# Patient Record
Sex: MALE | Race: WHITE | NOT HISPANIC OR LATINO | Employment: OTHER | URBAN - METROPOLITAN AREA
[De-identification: names, ages, dates, MRNs, and addresses within clinical notes are randomized per-mention and may not be internally consistent; named-entity substitution may affect disease eponyms.]

---

## 2018-05-04 ENCOUNTER — TELEPHONE (OUTPATIENT)
Dept: PODIATRY | Facility: CLINIC | Age: 58
End: 2018-05-04

## 2018-05-04 NOTE — TELEPHONE ENCOUNTER
----- Message from Maria Eugenia Mahan sent at 5/3/2018  2:40 PM CDT -----  Contact: [phone]  Patient requesting a refill on antiinflammatory.    Patient will be using [facility] in [facility]#[phone].    Please call patient at [phone].     Thanks!

## 2018-05-05 RX ORDER — DICLOFENAC SODIUM 75 MG/1
75 TABLET, DELAYED RELEASE ORAL 2 TIMES DAILY
Qty: 60 TABLET | Refills: 2 | Status: SHIPPED | OUTPATIENT
Start: 2018-05-05 | End: 2018-09-14 | Stop reason: SDUPTHER

## 2018-05-15 ENCOUNTER — OFFICE VISIT (OUTPATIENT)
Dept: PODIATRY | Facility: CLINIC | Age: 58
End: 2018-05-15
Payer: COMMERCIAL

## 2018-05-15 ENCOUNTER — HOSPITAL ENCOUNTER (OUTPATIENT)
Dept: RADIOLOGY | Facility: HOSPITAL | Age: 58
Discharge: HOME | End: 2018-05-15
Attending: PODIATRIST
Payer: COMMERCIAL

## 2018-05-15 VITALS
HEART RATE: 67 BPM | BODY MASS INDEX: 27.35 KG/M2 | WEIGHT: 220 LBS | HEIGHT: 75 IN | TEMPERATURE: 98 F | DIASTOLIC BLOOD PRESSURE: 84 MMHG | SYSTOLIC BLOOD PRESSURE: 152 MMHG

## 2018-05-15 DIAGNOSIS — M84.375A STRESS FRACTURE OF LEFT FOOT, INITIAL ENCOUNTER: Primary | ICD-10-CM

## 2018-05-15 DIAGNOSIS — L03.116 CELLULITIS OF LEFT FOOT: ICD-10-CM

## 2018-05-15 DIAGNOSIS — M84.375A STRESS FRACTURE OF LEFT FOOT, INITIAL ENCOUNTER: ICD-10-CM

## 2018-05-15 PROCEDURE — 73630 X-RAY EXAM OF FOOT: CPT | Mod: TC,FY,LT

## 2018-05-15 PROCEDURE — 87186 SC STD MICRODIL/AGAR DIL: CPT

## 2018-05-15 PROCEDURE — 99999 PR PBB SHADOW E&M-EST. PATIENT-LVL III: CPT | Mod: 25,PBBFAC,, | Performed by: PODIATRIST

## 2018-05-15 PROCEDURE — 3008F BODY MASS INDEX DOCD: CPT | Mod: CPTII,S$GLB,, | Performed by: PODIATRIST

## 2018-05-15 PROCEDURE — 87070 CULTURE OTHR SPECIMN AEROBIC: CPT

## 2018-05-15 PROCEDURE — 99213 OFFICE O/P EST LOW 20 MIN: CPT | Mod: S$GLB,,, | Performed by: PODIATRIST

## 2018-05-15 PROCEDURE — 73630 X-RAY EXAM OF FOOT: CPT | Mod: 26,LT,, | Performed by: RADIOLOGY

## 2018-05-15 PROCEDURE — 87077 CULTURE AEROBIC IDENTIFY: CPT

## 2018-05-15 RX ORDER — HYDROCODONE BITARTRATE AND ACETAMINOPHEN 5; 325 MG/1; MG/1
TABLET ORAL
COMMUNITY
End: 2018-10-11 | Stop reason: SDUPTHER

## 2018-05-15 RX ORDER — SULFAMETHOXAZOLE AND TRIMETHOPRIM 400; 80 MG/1; MG/1
TABLET ORAL
COMMUNITY
End: 2018-05-15 | Stop reason: SDUPTHER

## 2018-05-15 RX ORDER — DICLOFENAC SODIUM 75 MG/1
TABLET, DELAYED RELEASE ORAL
COMMUNITY
End: 2018-05-15

## 2018-05-15 RX ORDER — LISINOPRIL 10 MG/1
TABLET ORAL
COMMUNITY
End: 2018-08-21

## 2018-05-15 RX ORDER — SULFAMETHOXAZOLE AND TRIMETHOPRIM 400; 80 MG/1; MG/1
2 TABLET ORAL 2 TIMES DAILY
Qty: 56 TABLET | Refills: 0 | Status: SHIPPED | OUTPATIENT
Start: 2018-05-15 | End: 2018-05-29

## 2018-05-15 RX ORDER — GABAPENTIN 300 MG/1
CAPSULE ORAL
COMMUNITY
End: 2018-11-20 | Stop reason: SDUPTHER

## 2018-05-15 RX ORDER — OXYCODONE AND ACETAMINOPHEN 5; 325 MG/1; MG/1
TABLET ORAL
COMMUNITY
End: 2018-05-15

## 2018-05-15 RX ORDER — PROMETHAZINE HYDROCHLORIDE 12.5 MG/1
TABLET ORAL
COMMUNITY
End: 2018-07-23 | Stop reason: SDUPTHER

## 2018-05-16 ENCOUNTER — TELEPHONE (OUTPATIENT)
Dept: PODIATRY | Facility: CLINIC | Age: 58
End: 2018-05-16

## 2018-05-16 NOTE — TELEPHONE ENCOUNTER
----- Message from Gentry Flores DPM sent at 5/16/2018  7:34 AM CDT -----  Please call the patient regarding his abnormal result. There are signs of a stress fx he needs to be in the boot and will need a F/U visit in the [facility] in 2 weeks to see how he is doing but the boot needs to be on alt all times.

## 2018-05-16 NOTE — TELEPHONE ENCOUNTER
Advised pt of xray results and what he needed to do pt verbalized understanding appt scheduled for 2wk f/u

## 2018-05-16 NOTE — TELEPHONE ENCOUNTER
----- Message from Candice Reed sent at 5/16/2018 11:04 AM CDT -----  Type:  Patient Returning Call    Who Left Message for Patient:  Suad  Does the patient know what this is regarding?:  Xray results  Best Call Back Number: [phone] (home)

## 2018-05-16 NOTE — TELEPHONE ENCOUNTER
----- Message from Gentry Flores DPM sent at 5/16/2018  7:34 AM CDT -----  Please call the patient regarding his abnormal result. There are signs of a stress fx he needs to be in the boot and will need a F/U visit in the [facility] in 2 weeks to see how he is doing but the boot needs to be on alt all times.

## 2018-05-18 ENCOUNTER — TELEPHONE (OUTPATIENT)
Dept: PODIATRY | Facility: CLINIC | Age: 58
End: 2018-05-18

## 2018-05-18 LAB — BACTERIA SPEC AEROBE CULT: NORMAL

## 2018-05-18 NOTE — TELEPHONE ENCOUNTER
----- Message from Gentry Flores DPM sent at 5/18/2018  4:09 PM CDT -----  Please call the patient regarding his abnormal result.Call and advise C&S + staph he is to continue taking the Bactrim as rx.

## 2018-05-20 NOTE — PROGRESS NOTES
Subjective:       Patient ID: Jerry Islas is a 58 y.o. male.    Chief Complaint: Follow-up and Foot Problem   patient presents today he is complaining of pain in the left foot he's also been experiencing swelling in this area he states the pain is more in the middle of his foot not where he had surgery he has had some drainage over the distal part of the incision.  HPI  Review of Systems   Musculoskeletal: Positive for arthralgias, gait problem and joint swelling.   All other systems reviewed and are negative.      Objective:      Physical Exam   Constitutional: He appears well-developed and well-nourished.   Cardiovascular:   Pulses:       Dorsalis pedis pulses are 2+ on the right side, and 2+ on the left side.        Posterior tibial pulses are 1+ on the right side, and 1+ on the left side.   Musculoskeletal: He exhibits tenderness and deformity.        Left foot: There is decreased range of motion and deformity.        Feet:    Feet:   Right Foot:   Protective Sensation: 4 sites tested. 4 sites sensed.   Left Foot:   Protective Sensation: 4 sites tested. 4 sites sensed.   Skin Integrity: Positive for ulcer, skin breakdown, warmth and dry skin.   Neurological: He is alert.   Skin: Capillary refill takes 2 to 3 seconds.   Psychiatric: He has a normal mood and affect. His behavior is normal. Judgment and thought content normal.       Assessment:       1. Stress fracture of left foot, initial encounter    2. Cellulitis of left foot        Plan:       Following evaluation patient has a small open area displays positive drainage a culture and sensitivity was performed of this area patient will be placed on appropriate antibiotics pending culture and sensitivity.  Patient will initially be placed on Bactrim.  Patient has a lot of swelling in the left midfoot area this is not related to surgery patient has completely healed and had been discharged since his surgery for fusion first MPJ this is unrelated evaluation  and management patient is beyond his global period.  Patient was advised I am concerned about the possibility of stress fracture in the left foot because of the amount of swelling and tenderness especially overlying the second and third metatarsal area I have ordered x-rays of the area in the meantime I want the patient to get into his fracture boot until I see him for follow-up subsequent evaluation did reveal reveal a stress fracture second metatarsal left patient was contacted advised as to the situation and is to wear the fracture boot at all times I have recommended ice and elevation.

## 2018-05-30 ENCOUNTER — OFFICE VISIT (OUTPATIENT)
Dept: PODIATRY | Facility: CLINIC | Age: 58
End: 2018-05-30
Payer: COMMERCIAL

## 2018-05-30 ENCOUNTER — HOSPITAL ENCOUNTER (OUTPATIENT)
Dept: RADIOLOGY | Facility: HOSPITAL | Age: 58
Discharge: HOME | End: 2018-05-30
Attending: PODIATRIST
Payer: COMMERCIAL

## 2018-05-30 VITALS
HEIGHT: 75 IN | SYSTOLIC BLOOD PRESSURE: 117 MMHG | BODY MASS INDEX: 24.87 KG/M2 | WEIGHT: 200 LBS | DIASTOLIC BLOOD PRESSURE: 71 MMHG | TEMPERATURE: 96 F | HEART RATE: 75 BPM

## 2018-05-30 DIAGNOSIS — M84.375G STRESS FRACTURE OF LEFT FOOT WITH DELAYED HEALING, SUBSEQUENT ENCOUNTER: Primary | ICD-10-CM

## 2018-05-30 DIAGNOSIS — M84.375G STRESS FRACTURE OF LEFT FOOT WITH DELAYED HEALING, SUBSEQUENT ENCOUNTER: ICD-10-CM

## 2018-05-30 PROCEDURE — 73630 X-RAY EXAM OF FOOT: CPT | Mod: 26,LT,, | Performed by: RADIOLOGY

## 2018-05-30 PROCEDURE — 99213 OFFICE O/P EST LOW 20 MIN: CPT | Mod: S$GLB,,, | Performed by: PODIATRIST

## 2018-05-30 PROCEDURE — 99999 PR PBB SHADOW E&M-EST. PATIENT-LVL III: CPT | Mod: 25,PBBFAC,, | Performed by: PODIATRIST

## 2018-05-30 PROCEDURE — 73630 X-RAY EXAM OF FOOT: CPT | Mod: TC,FY,LT

## 2018-05-30 PROCEDURE — 3008F BODY MASS INDEX DOCD: CPT | Mod: CPTII,S$GLB,, | Performed by: PODIATRIST

## 2018-06-02 NOTE — PROGRESS NOTES
Subjective:       Patient ID: Jerry Islas is a 58 y.o. male.    Chief Complaint: Follow-up   patient presents today he is complaining of pain in the left foot he's also been experiencing swelling in this area he states the pain is more in the middle of his foot not where he had surgery he has had some drainage over the distal part of the incision.  HPI  Review of Systems   Musculoskeletal: Positive for arthralgias, gait problem and joint swelling.   All other systems reviewed and are negative.      Objective:      Physical Exam   Constitutional: He appears well-developed and well-nourished.   Cardiovascular:   Pulses:       Dorsalis pedis pulses are 2+ on the right side, and 2+ on the left side.        Posterior tibial pulses are 1+ on the right side, and 1+ on the left side.   Musculoskeletal: He exhibits tenderness and deformity.        Left foot: There is decreased range of motion and deformity.        Feet:    Feet:   Right Foot:   Protective Sensation: 4 sites tested. 4 sites sensed.   Left Foot:   Protective Sensation: 4 sites tested. 4 sites sensed.   Skin Integrity: Positive for ulcer, skin breakdown, warmth and dry skin.   Neurological: He is alert.   Skin: Capillary refill takes 2 to 3 seconds.   Psychiatric: He has a normal mood and affect. His behavior is normal. Judgment and thought content normal.       Assessment:       1. Stress fracture of left foot with delayed healing, subsequent encounter        Plan:       Following evaluation patient has less inflammation the previously noted he is also having less pain he states the gabapentin is helping some I have reviewed the patient's previous and today's x-rays with him showing the patient a stress fracture of the 2nd metatarsal I have advised him it is stable with a well-formed bone callus around the area patient was dispensed full length arch supports he was advised to wear these in the fracture boot that should help to off load pressure on the  forefoot giving him relief I want see how the patient is doing at his next follow-up in several weeks in the meantime the patient is to continue diclofenac gabapentin he does have a very small opening over the big toe this appears completely resolved at this time displays no drainage no active signs of infection.  Patient advised that may need to add some additional support to the power step depending upon how he tolerates them.

## 2018-06-13 ENCOUNTER — HOSPITAL ENCOUNTER (OUTPATIENT)
Dept: RADIOLOGY | Facility: HOSPITAL | Age: 58
Discharge: HOME | End: 2018-06-13
Attending: PODIATRIST
Payer: COMMERCIAL

## 2018-06-13 ENCOUNTER — OFFICE VISIT (OUTPATIENT)
Dept: PODIATRY | Facility: CLINIC | Age: 58
End: 2018-06-13
Payer: COMMERCIAL

## 2018-06-13 VITALS
DIASTOLIC BLOOD PRESSURE: 76 MMHG | RESPIRATION RATE: 18 BRPM | SYSTOLIC BLOOD PRESSURE: 131 MMHG | HEART RATE: 72 BPM | TEMPERATURE: 96 F

## 2018-06-13 DIAGNOSIS — M84.375G STRESS FRACTURE OF LEFT FOOT WITH DELAYED HEALING, SUBSEQUENT ENCOUNTER: Primary | ICD-10-CM

## 2018-06-13 DIAGNOSIS — M84.375G STRESS FRACTURE OF LEFT FOOT WITH DELAYED HEALING, SUBSEQUENT ENCOUNTER: ICD-10-CM

## 2018-06-13 PROCEDURE — 73630 X-RAY EXAM OF FOOT: CPT | Mod: 26,LT,, | Performed by: RADIOLOGY

## 2018-06-13 PROCEDURE — 73630 X-RAY EXAM OF FOOT: CPT | Mod: TC,FY,LT

## 2018-06-13 PROCEDURE — 99213 OFFICE O/P EST LOW 20 MIN: CPT | Mod: S$GLB,,, | Performed by: PODIATRIST

## 2018-06-13 PROCEDURE — 99999 PR PBB SHADOW E&M-EST. PATIENT-LVL III: CPT | Mod: 25,PBBFAC,, | Performed by: PODIATRIST

## 2018-06-16 NOTE — PROGRESS NOTES
Subjective:       Patient ID: Jerry Islas is a 58 y.o. male.    Chief Complaint: Follow-up   patient presents today he is complaining of pain in the left foot he's also been experiencing swelling in this area he states the pain is more in the middle of his foot not where he had surgery he has had some drainage over the distal part of the incision.  HPI  Review of Systems   Musculoskeletal: Positive for arthralgias, gait problem and joint swelling.   All other systems reviewed and are negative.      Objective:      Physical Exam   Constitutional: He appears well-developed and well-nourished.   Cardiovascular:   Pulses:       Dorsalis pedis pulses are 2+ on the right side, and 2+ on the left side.        Posterior tibial pulses are 1+ on the right side, and 1+ on the left side.   Musculoskeletal: He exhibits tenderness and deformity.        Left foot: There is decreased range of motion and deformity.        Feet:    Feet:   Right Foot:   Protective Sensation: 4 sites tested. 4 sites sensed.   Left Foot:   Protective Sensation: 4 sites tested. 4 sites sensed.   Skin Integrity: Positive for ulcer, skin breakdown, warmth and dry skin.   Neurological: He is alert.   Skin: Capillary refill takes 2 to 3 seconds.   Psychiatric: He has a normal mood and affect. His behavior is normal. Judgment and thought content normal.       Assessment:       1. Stress fracture of left foot with delayed healing, subsequent encounter        Plan:         Following evaluation x-rays reviewed patient advised the fracture of the 2nd metatarsal is stable per the x-rays patient is still having some swelling he states that he is able to wear a regular shoe for about 3 hr until he has a lot of swelling that he has to go back into the boot I have advised the patient this is going to be on and off for a period of time until he gets over the inflammation patient does have some deep peroneal numbness in the 1st webspace likely related to the  swelling.  I have added additional arch support to the patient's left insole to give him better support offload pressure from the forefoot area patient was dispensed a compressive stocking to be worn at all times during the day to help control inflammation plan follow-up is 3 weeks patient is advised to contact us with any problems questions or concerns prior to that time.

## 2018-07-23 RX ORDER — PROMETHAZINE HYDROCHLORIDE 12.5 MG/1
TABLET ORAL
Qty: 30 TABLET | Refills: 0 | Status: SHIPPED | OUTPATIENT
Start: 2018-07-23 | End: 2018-08-21

## 2018-08-16 ENCOUNTER — TELEPHONE (OUTPATIENT)
Dept: PODIATRY | Facility: CLINIC | Age: 58
End: 2018-08-16

## 2018-08-16 NOTE — TELEPHONE ENCOUNTER
----- Message from Anson Mcelroy sent at 8/16/2018  2:44 PM CDT -----  Contact: Wife Maite   Type:  Sooner Apoointment Request    Caller is requesting a sooner appointment.  Caller declined first available appointment listed below.  Caller will not accept being placed on the waitlist and is requesting a message be sent to doctor.    Name of Caller: Maurilio Arora   When is the first available appointment? 8/27  Symptoms: left 2nd toe fungus, red swollen   Best Call Back Number: [phone]  Additional Info: Wife is requesting tomorrow or sometime next week

## 2018-08-21 ENCOUNTER — TELEPHONE (OUTPATIENT)
Dept: PODIATRY | Facility: CLINIC | Age: 58
End: 2018-08-21

## 2018-08-21 ENCOUNTER — OFFICE VISIT (OUTPATIENT)
Dept: PODIATRY | Facility: CLINIC | Age: 58
End: 2018-08-21
Payer: COMMERCIAL

## 2018-08-21 VITALS
HEIGHT: 75 IN | BODY MASS INDEX: 27.1 KG/M2 | DIASTOLIC BLOOD PRESSURE: 83 MMHG | WEIGHT: 218 LBS | HEART RATE: 64 BPM | TEMPERATURE: 96 F | SYSTOLIC BLOOD PRESSURE: 131 MMHG

## 2018-08-21 DIAGNOSIS — L03.116 CELLULITIS OF LEFT FOOT: Primary | ICD-10-CM

## 2018-08-21 DIAGNOSIS — M20.42 HAMMER TOE OF LEFT FOOT: ICD-10-CM

## 2018-08-21 DIAGNOSIS — L97.522 SKIN ULCER OF LEFT FOOT WITH FAT LAYER EXPOSED: ICD-10-CM

## 2018-08-21 PROCEDURE — 99213 OFFICE O/P EST LOW 20 MIN: CPT | Mod: 25,S$GLB,, | Performed by: PODIATRIST

## 2018-08-21 PROCEDURE — 99999 PR PBB SHADOW E&M-EST. PATIENT-LVL III: CPT | Mod: PBBFAC,,, | Performed by: PODIATRIST

## 2018-08-21 PROCEDURE — 87186 SC STD MICRODIL/AGAR DIL: CPT | Mod: 59

## 2018-08-21 PROCEDURE — 3008F BODY MASS INDEX DOCD: CPT | Mod: CPTII,S$GLB,, | Performed by: PODIATRIST

## 2018-08-21 PROCEDURE — 87070 CULTURE OTHR SPECIMN AEROBIC: CPT

## 2018-08-21 PROCEDURE — 11042 DBRDMT SUBQ TIS 1ST 20SQCM/<: CPT | Mod: S$GLB,,, | Performed by: PODIATRIST

## 2018-08-21 PROCEDURE — 87077 CULTURE AEROBIC IDENTIFY: CPT

## 2018-08-21 RX ORDER — CIPROFLOXACIN 500 MG/1
500 TABLET ORAL 2 TIMES DAILY
Qty: 28 TABLET | Refills: 0 | Status: SHIPPED | OUTPATIENT
Start: 2018-08-21 | End: 2018-09-04

## 2018-08-21 NOTE — TELEPHONE ENCOUNTER
----- Message from William De La Cruz sent at 8/21/2018 11:49 AM CDT -----  Contact: Patient  Jerry, [phone]. Calling to inform staff that he will not be able to make it back to the office today to  paperwork. Would like to have it held until his appointment on 8/28

## 2018-08-26 LAB
BACTERIA SPEC AEROBE CULT: NORMAL

## 2018-08-26 RX ORDER — SULFAMETHOXAZOLE AND TRIMETHOPRIM 400; 80 MG/1; MG/1
2 TABLET ORAL 2 TIMES DAILY
Qty: 56 TABLET | Refills: 0 | Status: SHIPPED | OUTPATIENT
Start: 2018-08-26 | End: 2018-09-09

## 2018-08-26 NOTE — PROCEDURES
"Wound Debridement  Date/Time: 8/21/2018 8:37 PM  Performed by: Gentry Flores DPM  Authorized by: Gentry Flores DPM     Time out: Immediately prior to procedure a "time out" was called to verify the correct patient, procedure, equipment, support staff and site/side marked as required.    Consent Done?:  Yes (Verbal)    Preparation: Patient was prepped and draped in usual sterile fashion    Local anesthesia used?: No      Wound Details:    Location:  Left foot    Location:  Left 2nd Toe    Type of Debridement:  Excisional       Length (cm):  2       Area (sq cm):  4       Width (cm):  2       Percent Debrided (%):  100       Depth (cm):  0.3       Total Area Debrided (sq cm):  4    Depth of debridement:  Subcutaneous tissue    Devitalized tissue debrided:  Callus and Necrotic/Eschar    Instruments:  Blade    Bleeding:  None  Patient tolerance:  Patient tolerated the procedure well with no immediate complications      "

## 2018-08-26 NOTE — PROGRESS NOTES
Subjective:       Patient ID: Jerry Islas is a 58 y.o. male.    Chief Complaint: Foot Problem (2snd digit left foot ); Nail Problem; and Nail Care    Patient presents today with a complaint of a swollen red painful 2nd digit left foot patient states that this got bad about a week ago he states it broke open drained and is actually started to feel a little bit better but has remained very red.  Patient relates he is very pleased with the fusion of the big toe joint on the left foot this has remained completely pain-free.  HPI  Review of Systems   Musculoskeletal: Positive for arthralgias, gait problem and joint swelling.   All other systems reviewed and are negative.      Objective:      Physical Exam   Constitutional: He appears well-developed and well-nourished.   Cardiovascular:   Pulses:       Dorsalis pedis pulses are 2+ on the right side, and 2+ on the left side.        Posterior tibial pulses are 1+ on the right side, and 1+ on the left side.   Musculoskeletal: He exhibits edema, tenderness and deformity.        Left foot: There is decreased range of motion and deformity.   Feet:   Right Foot:   Protective Sensation: 4 sites tested. 4 sites sensed.   Left Foot:   Protective Sensation: 4 sites tested. 4 sites sensed.   Skin Integrity: Positive for ulcer, skin breakdown, erythema, warmth and callus.   Neurological: He is alert.   Skin: Skin is warm. Capillary refill takes 2 to 3 seconds. There is erythema.   Psychiatric: He has a normal mood and affect. His behavior is normal. Judgment and thought content normal.   Nursing note and vitals reviewed.      Assessment:       1. Cellulitis of left foot    2. Hammer toe of left foot    3. Skin ulcer of left foot with fat layer exposed        Plan:         Following extensive evaluation patient has a severe infection of the distal aspect 2nd digit left there is a large ulceration heavy drainage cellulitis encompassing the distal half of the 2nd digit left with  obvious signs of significant infection present pain is noted upon palpation and examination of the area extensive debridement was performed removing nonviable skin and tissue culture and sensitivity performed.  Patient has been started on Cipro pending culture and sensitivity once finalized the patient's antibiotics will be adjusted accordingly in the meantime patient is to keep the area dry clean he is going to clean the area with Dakin solution and dress it on a daily basis he is not to get this area wet.  Plan follow-up will be 1 week patient needs x-rays of the area to evaluate the area for osteomyelitis.  Total face-to-face time including discussion evaluation and treatment equaled 20 min.

## 2018-08-27 ENCOUNTER — TELEPHONE (OUTPATIENT)
Dept: PODIATRY | Facility: CLINIC | Age: 58
End: 2018-08-27

## 2018-08-27 NOTE — TELEPHONE ENCOUNTER
----- Message from Gentry Flores DPM sent at 8/26/2018 11:31 AM CDT -----  Please call the patient regarding his abnormal result.Call and advise there are 4 different bacteria on C&S I need him to start Bactrim in addition to cipro. Rx send.

## 2018-08-28 ENCOUNTER — OFFICE VISIT (OUTPATIENT)
Dept: PODIATRY | Facility: CLINIC | Age: 58
End: 2018-08-28
Payer: COMMERCIAL

## 2018-08-28 VITALS
DIASTOLIC BLOOD PRESSURE: 79 MMHG | SYSTOLIC BLOOD PRESSURE: 139 MMHG | BODY MASS INDEX: 27.35 KG/M2 | TEMPERATURE: 97 F | HEART RATE: 67 BPM | HEIGHT: 75 IN | WEIGHT: 220 LBS

## 2018-08-28 DIAGNOSIS — L97.522 SKIN ULCER OF LEFT FOOT WITH FAT LAYER EXPOSED: ICD-10-CM

## 2018-08-28 DIAGNOSIS — L03.116 CELLULITIS OF LEFT FOOT: Primary | ICD-10-CM

## 2018-08-28 PROCEDURE — 3008F BODY MASS INDEX DOCD: CPT | Mod: CPTII,S$GLB,, | Performed by: PODIATRIST

## 2018-08-28 PROCEDURE — 99213 OFFICE O/P EST LOW 20 MIN: CPT | Mod: S$GLB,,, | Performed by: PODIATRIST

## 2018-08-28 PROCEDURE — 99999 PR PBB SHADOW E&M-EST. PATIENT-LVL III: CPT | Mod: PBBFAC,,, | Performed by: PODIATRIST

## 2018-08-28 NOTE — TELEPHONE ENCOUNTER
Left message for pt of lab results and another ABT to be added along with cipro. Pt has appt today will make sure message was received

## 2018-09-03 NOTE — PROGRESS NOTES
Subjective:       Patient ID: Jerry Islas is a 58 y.o. male.    Chief Complaint: Nail Problem and Foot Problem    Patient presents today with a complaint of a swollen red painful 2nd digit left foot patient states that this got bad about a week ago he states it broke open drained and is actually started to feel a little bit better but has remained very red.  Patient relates he is very pleased with the fusion of the big toe joint on the left foot this has remained completely pain-free.  Nail Problem   Associated symptoms include arthralgias and joint swelling.     Review of Systems   Musculoskeletal: Positive for arthralgias, gait problem and joint swelling.   All other systems reviewed and are negative.      Objective:      Physical Exam   Constitutional: He appears well-developed and well-nourished.   Cardiovascular:   Pulses:       Dorsalis pedis pulses are 2+ on the right side, and 2+ on the left side.        Posterior tibial pulses are 1+ on the right side, and 1+ on the left side.   Musculoskeletal: He exhibits edema, tenderness and deformity.        Left foot: There is decreased range of motion and deformity.   Feet:   Right Foot:   Protective Sensation: 4 sites tested. 4 sites sensed.   Left Foot:   Protective Sensation: 4 sites tested. 4 sites sensed.   Skin Integrity: Positive for ulcer, skin breakdown, erythema, warmth and callus.   Neurological: He is alert.   Skin: Skin is warm. Capillary refill takes 2 to 3 seconds. There is erythema.   Psychiatric: He has a normal mood and affect. His behavior is normal. Judgment and thought content normal.   Nursing note and vitals reviewed.      Assessment:       1. Cellulitis of left foot    2. Skin ulcer of left foot with fat layer exposed        Plan:         Following evaluation patient still has severe pain in the 2nd digit left foot this was painful even to light touch I have advised the patient I want to see him in my other office for his next visit so I  can take an x-ray of the area the 2nd digit left is severely contracted he now has an ulceration on the distal aspect of the 2nd digit while the area looks better some of the redness and cellulitis as well as edema has decreased patient still has severe pain in this area he will continue to take his Cipro and Bactrim as prescribed he states he was not able to  the Bactrim just yet because we recently added this per his culture and sensitivity he will clean the area with Dakin solution apply a well-padded dry dressing.  Follow-up will be 1 week further evaluation treatment an x-ray.  Patient was made aware I am concerned about the possibility of osteomyelitis in the distal 2nd digit left.

## 2018-09-05 ENCOUNTER — OFFICE VISIT (OUTPATIENT)
Dept: PODIATRY | Facility: CLINIC | Age: 58
End: 2018-09-05
Payer: COMMERCIAL

## 2018-09-05 ENCOUNTER — HOSPITAL ENCOUNTER (OUTPATIENT)
Dept: RADIOLOGY | Facility: HOSPITAL | Age: 58
Discharge: HOME | End: 2018-09-05
Attending: PODIATRIST
Payer: COMMERCIAL

## 2018-09-05 VITALS
BODY MASS INDEX: 27.35 KG/M2 | TEMPERATURE: 97 F | DIASTOLIC BLOOD PRESSURE: 79 MMHG | HEART RATE: 67 BPM | SYSTOLIC BLOOD PRESSURE: 155 MMHG | WEIGHT: 220 LBS | HEIGHT: 75 IN

## 2018-09-05 DIAGNOSIS — L03.116 CELLULITIS OF LEFT FOOT: Primary | ICD-10-CM

## 2018-09-05 DIAGNOSIS — L03.116 CELLULITIS OF LEFT FOOT: ICD-10-CM

## 2018-09-05 DIAGNOSIS — L97.521 SKIN ULCER OF LEFT FOOT, LIMITED TO BREAKDOWN OF SKIN: ICD-10-CM

## 2018-09-05 PROCEDURE — 99999 PR PBB SHADOW E&M-EST. PATIENT-LVL III: CPT | Mod: PBBFAC,,, | Performed by: PODIATRIST

## 2018-09-05 PROCEDURE — 99213 OFFICE O/P EST LOW 20 MIN: CPT | Mod: S$GLB,,, | Performed by: PODIATRIST

## 2018-09-05 PROCEDURE — 3008F BODY MASS INDEX DOCD: CPT | Mod: CPTII,S$GLB,, | Performed by: PODIATRIST

## 2018-09-05 PROCEDURE — 73630 X-RAY EXAM OF FOOT: CPT | Mod: TC,FY,LT

## 2018-09-05 PROCEDURE — 73630 X-RAY EXAM OF FOOT: CPT | Mod: 26,LT,, | Performed by: RADIOLOGY

## 2018-09-08 PROBLEM — L97.521 SKIN ULCER OF LEFT FOOT, LIMITED TO BREAKDOWN OF SKIN: Status: ACTIVE | Noted: 2018-09-08

## 2018-09-09 NOTE — PROGRESS NOTES
Subjective:       Patient ID: Jerry Islas is a 58 y.o. male.    Chief Complaint: Follow-up and Foot Problem      Patient presents for follow-up cellulitis abscess 2nd digit left.  Patient states he is having significant reduction in previously noted pain and discomfort left.  Nail Problem   Associated symptoms include arthralgias and joint swelling.     Review of Systems   Musculoskeletal: Positive for arthralgias, gait problem and joint swelling.   All other systems reviewed and are negative.      Objective:      Physical Exam   Constitutional: He appears well-developed and well-nourished.   Cardiovascular:   Pulses:       Dorsalis pedis pulses are 2+ on the right side, and 2+ on the left side.        Posterior tibial pulses are 1+ on the right side, and 1+ on the left side.   Musculoskeletal: He exhibits edema, tenderness and deformity.        Left foot: There is decreased range of motion and deformity.   Feet:   Right Foot:   Protective Sensation: 4 sites tested. 4 sites sensed.   Left Foot:   Protective Sensation: 4 sites tested. 4 sites sensed.   Skin Integrity: Positive for ulcer, skin breakdown, erythema, warmth and callus.   Neurological: He is alert.   Skin: Skin is warm. Capillary refill takes 2 to 3 seconds. There is erythema.   Psychiatric: He has a normal mood and affect. His behavior is normal. Judgment and thought content normal.   Nursing note and vitals reviewed.      Assessment:       1. Cellulitis of left foot        Plan:           On evaluation patient has significant reduction in previously noted pain cellulitis and edema of the 2nd digit left foot the ulceration at the distal aspect of the 2nd digit is well resolving at this time patient still has severe contracture 2nd digit left however overall improvement is noted significant reduction in previous tenderness is noted.  Ulceration distal 2nd digit left has well-formed granular tissue no active signs of infection noted.  Following  evaluation I did non excisionally debride the remaining nonviable tissue and nail at the distal aspect of the 2nd digit left advising the patient the infection appears well resolving at this time patient did have a hyperkeratotic lesion sub 3rd metatarsal left this is related to digital contracture also this was painful upon palpation debridement of the area non excisional displayed no signs of infection.  I did discuss an arthrodesis of the 2nd and 3rd digits left for surgical correction and reduction of these digits because of the severe deformities and contractures patient advised this likely can occur again with the 2nd digit ulceration and infection because of the contracture and weight on the distal aspect of the digit.  Patient is going to think about the surgical option as discussed today and will consider this I plan to see the patient as needed for followup he will monitor this area in the 2nd digit any increased redness swelling or pain the patient is to contact me immediately.  X-rays evaluated and reviewed to the patient no signs of osteomyelitis noted distal aspect 2nd digit left.

## 2018-09-14 RX ORDER — DICLOFENAC SODIUM 75 MG/1
TABLET, DELAYED RELEASE ORAL
Qty: 60 TABLET | Refills: 2 | Status: SHIPPED | OUTPATIENT
Start: 2018-09-14 | End: 2019-01-07 | Stop reason: SDUPTHER

## 2018-10-10 ENCOUNTER — TELEPHONE (OUTPATIENT)
Dept: PODIATRY | Facility: CLINIC | Age: 58
End: 2018-10-10

## 2018-10-10 NOTE — TELEPHONE ENCOUNTER
----- Message from Lizzy Truong sent at 10/10/2018 10:17 AM CDT -----  Contact: Patient  Type:  Sooner Apoointment Request    Caller is requesting a sooner appointment.  Caller declined first available appointment listed below.  Caller will not accept being placed on the waitlist and is requesting a message be sent to doctor.    Name of Caller:  Patient   When is the first available appointment?  10/17  Symptoms:  Follow-up on left foot, patient can barely walk on it  Best Call Back Number:    Additional Information:

## 2018-10-11 ENCOUNTER — OFFICE VISIT (OUTPATIENT)
Dept: PODIATRY | Facility: CLINIC | Age: 58
End: 2018-10-11
Payer: COMMERCIAL

## 2018-10-11 VITALS
DIASTOLIC BLOOD PRESSURE: 74 MMHG | BODY MASS INDEX: 27.35 KG/M2 | HEIGHT: 75 IN | HEART RATE: 71 BPM | WEIGHT: 220 LBS | RESPIRATION RATE: 18 BRPM | SYSTOLIC BLOOD PRESSURE: 131 MMHG

## 2018-10-11 DIAGNOSIS — L02.612 CELLULITIS AND ABSCESS OF TOE OF LEFT FOOT: ICD-10-CM

## 2018-10-11 DIAGNOSIS — G62.9 NEUROPATHY: ICD-10-CM

## 2018-10-11 DIAGNOSIS — L03.032 CELLULITIS AND ABSCESS OF TOE OF LEFT FOOT: ICD-10-CM

## 2018-10-11 DIAGNOSIS — M21.962 ACQUIRED DEFORMITY OF JOINT OF LEFT FOOT: ICD-10-CM

## 2018-10-11 DIAGNOSIS — L97.522 NON-PRESSURE CHRONIC ULCER OF OTHER PART OF LEFT FOOT WITH FAT LAYER EXPOSED: ICD-10-CM

## 2018-10-11 DIAGNOSIS — L97.522 SKIN ULCER OF LEFT FOOT WITH FAT LAYER EXPOSED: Primary | ICD-10-CM

## 2018-10-11 PROCEDURE — 87070 CULTURE OTHR SPECIMN AEROBIC: CPT

## 2018-10-11 PROCEDURE — 99999 PR PBB SHADOW E&M-EST. PATIENT-LVL III: CPT | Mod: PBBFAC,,, | Performed by: PODIATRIST

## 2018-10-11 PROCEDURE — 3008F BODY MASS INDEX DOCD: CPT | Mod: S$GLB,,, | Performed by: PODIATRIST

## 2018-10-11 PROCEDURE — 99214 OFFICE O/P EST MOD 30 MIN: CPT | Mod: 25,S$GLB,, | Performed by: PODIATRIST

## 2018-10-11 PROCEDURE — 97597 DBRDMT OPN WND 1ST 20 CM/<: CPT | Mod: S$GLB,,, | Performed by: PODIATRIST

## 2018-10-11 PROCEDURE — 87070 CULTURE OTHR SPECIMN AEROBIC: CPT | Mod: LT

## 2018-10-11 RX ORDER — HYDROCODONE BITARTRATE AND ACETAMINOPHEN 5; 325 MG/1; MG/1
1 TABLET ORAL EVERY 6 HOURS PRN
Qty: 30 TABLET | Refills: 0 | Status: SHIPPED | OUTPATIENT
Start: 2018-10-11 | End: 2019-01-23 | Stop reason: ALTCHOICE

## 2018-10-11 RX ORDER — CLINDAMYCIN HYDROCHLORIDE 150 MG/1
300 CAPSULE ORAL 3 TIMES DAILY
Qty: 60 CAPSULE | Refills: 0 | Status: SHIPPED | OUTPATIENT
Start: 2018-10-11 | End: 2018-10-21

## 2018-10-15 ENCOUNTER — OFFICE VISIT (OUTPATIENT)
Dept: PODIATRY | Facility: CLINIC | Age: 58
End: 2018-10-15
Payer: COMMERCIAL

## 2018-10-15 VITALS
HEART RATE: 75 BPM | WEIGHT: 220 LBS | HEIGHT: 75 IN | DIASTOLIC BLOOD PRESSURE: 76 MMHG | TEMPERATURE: 97 F | SYSTOLIC BLOOD PRESSURE: 125 MMHG | BODY MASS INDEX: 27.35 KG/M2

## 2018-10-15 DIAGNOSIS — L03.116 CELLULITIS OF LEFT FOOT: Primary | ICD-10-CM

## 2018-10-15 DIAGNOSIS — L97.521 SKIN ULCER OF LEFT FOOT, LIMITED TO BREAKDOWN OF SKIN: ICD-10-CM

## 2018-10-15 LAB — BACTERIA SPEC AEROBE CULT: NORMAL

## 2018-10-15 PROCEDURE — 99999 PR PBB SHADOW E&M-EST. PATIENT-LVL III: CPT | Mod: PBBFAC,,, | Performed by: PODIATRIST

## 2018-10-15 PROCEDURE — 99213 OFFICE O/P EST LOW 20 MIN: CPT | Mod: S$GLB,,, | Performed by: PODIATRIST

## 2018-10-15 PROCEDURE — 3008F BODY MASS INDEX DOCD: CPT | Mod: S$GLB,,, | Performed by: PODIATRIST

## 2018-10-19 NOTE — PROCEDURES
"Wound Debridement  Date/Time: 10/11/2018 1:32 PM  Performed by: Jazmin Flores DPM  Authorized by: Jazmin Flores DPM     Time out: Immediately prior to procedure a "time out" was called to verify the correct patient, procedure, equipment, support staff and site/side marked as required.    Consent Done?:  Yes (Verbal)  Local anesthesia used?: No      Wound Details:    Location:  Left foot    Location:  Left 4th Metatarsal Head       Length (cm):  4       Area (sq cm):  4       Width (cm):  1       Percent Debrided (%):  100       Depth (cm):  0.5       Total Area Debrided (sq cm):  4    Depth of debridement:  Epidermis/Dermis    Tissue debrided:  Adipose and Subcutaneous    Instruments:  Blade (15)    Bleeding:  Minimal  Hemostasis Achieved: No    Method Used:  Pressure  Patient tolerance:  Patient tolerated the procedure well with no immediate complications       Area cleansed/flushed with copious amounts of sterile saline, heavy iodine, heavy gauze compression dressing applied.      "

## 2018-10-19 NOTE — PROGRESS NOTES
Subjective:      Patient ID: Jerry Islas is a 58 y.o. male.    Chief Complaint: Foot Pain (left)  Patient presents with complaint of painful, discolored area on the bottom of the left foot.  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 Gentry Flores, last seen 9/5/2018.  Has history of previous ulceration with cellulitis  in a different   region of this foot.  Relates some discomfort and discoloration was 1st noted about 1 and half weeks ago,   tried staying off his foot for a few days, tried wearing walking boot for work a few days, no improvement,   actually seems worse.      ROS     Constitutional    Pleasant, well-nourished, no distress, well oriented    Cardiovascular          No chest pain, no shortness of breath    Respiratory           No cough, no congestion     Musculoskeletal        No muscle aches, no arthralgias/joint pain, no back pain, no swelling in the extremities    Neurologic         No weakness, no numbness    Psychiatric   No depression, no anxiety            Objective:      Physical Exam  Vascular         Arterial Pulses Right: posterior tibialis 2/4, dorsalis pedis 2/4, normal CFT   Arterial Pulses Left: posterior tibialis 2/4, dorsalis pedis 2/4, normal CFT   No lower extremity edema bilateral   Pedal skin temperature and color are normal bilateral     Integumentary   Patient has a discolored, hyperkeratotic lesion sub 4th MPJ left foot with obvious underlying fluid.       Upon debridement there is an ulcer with clear, bloody drainage tracking towards 4th web space.      Small area sub 4th MPJ fat layer exposed, no tracking through this area.        Positive edema, erythema and calor.        This is partly due to compensating for hallux rigidus/area of previous foot surgery    Neurological   Gross sensation intact, paresthesias left foot, takes Neurontin    Musculoskeletal   Muscle Strength/Testing and Tone:  Intact, normal tone bilateral   Joints, Bones, and Muscles:  Limited range of motion 1st MPJ,  arthritic degenerative changes left foot        Walks well an assisted          Assessment:       Encounter Diagnoses   Name Primary?    Skin ulcer of left foot with fat layer exposed - Left Foot Yes    Cellulitis and abscess of toe of left foot     Acquired deformity of joint of left foot     Neuropathy          Plan:       Jerry was seen today for foot pain.    Diagnoses and all orders for this visit:    Skin ulcer of left foot with fat layer exposed - Left Foot  -     Aerobic culture    Cellulitis and abscess of toe of left foot    Acquired deformity of joint of left foot    Neuropathy    Other orders  -     clindamycin (CLEOCIN) 150 MG capsule; Take 2 capsules (300 mg total) by mouth 3 (three) times daily. for 10 days  -     HYDROcodone-acetaminophen (NORCO) 5-325 mg per tablet; Take 1 tablet by mouth every 6 (six) hours as needed for Pain.      Reviewed potential complications with patient.  Discussed concern regarding infection.  Ulcer sub 4th MPJ left foot was sharply debrided tracking to the 4th web space plantarly.  All nonviable tissue was removed.  See procedure report attached.  Instructed patient to apply same dressing daily, heavy iodine and well-padded dressing.  Instructed patient to change gauze dressing   any time he notes drainage on the bandage.  Instructed patient to keep the wound clean and dry, this includes applying a water tight bag well taking a shower, no soaking.  Patient was started on clindamycin 300 mg 3 times daily times 10 days.  Prescribed hydrocodone 5 mg as needed every 6 hr for pain.  Explained patient he needs to stay off of his foot until follow-up.  He admitted he was not able to do this, had to go to work.  We agreed   that he would continue to utilize well-padded dressing and walking boot.  Will follow up with Dr. Steve Flores in 4 days.  Counseled the patient on his conditions, their implications and medical management.  Instructed patient to contact the office  with any changes, questions, concerns, worsening of symptoms. Patient verbalized understanding.   Total face to face time, exam, assessment, treatment, discussion, documentation 25 minutes, more than half this time spent on consultation   and coordination of care.  Additional time required for procedure/ wound debridement.  Follow up 10/15/2018 Dr. Gentry Flores.      This note was created using [employer] voice recognition software that occasionally misinterpreted phrases or words.

## 2018-10-20 NOTE — PROGRESS NOTES
Subjective:      Patient ID: Jerry Islas is a 58 y.o. male.    Chief Complaint: Follow-up; Foot Problem; and Foot Ulcer    Patient presents for follow-up of an ulceration with infected wound on the plantar surface of the patient's left foot.  Patient states the area still very painful although it has gotten somewhat better.  Patient has a history of previous digital infection 2nd digit left.      ROS     Constitutional    Pleasant, well-nourished, no distress, well oriented    Cardiovascular          No chest pain, no shortness of breath    Respiratory           No cough, no congestion     Musculoskeletal        No muscle aches, no arthralgias/joint pain, no back pain, no swelling in the extremities    Neurologic         No weakness, no numbness    Psychiatric   No depression, no anxiety            Objective:      Physical Exam  Vascular         Arterial Pulses Right: posterior tibialis 2/4, dorsalis pedis 2/4, normal CFT   Arterial Pulses Left: posterior tibialis 2/4, dorsalis pedis 2/4, normal CFT   No lower extremity edema bilateral   Pedal skin temperature and color are normal bilateral     Integumentary   Patient has a discolored, hyperkeratotic lesion sub 3rd MPJ left foot with obvious underlying fluid.       Upon debridement there is an ulcer  towards 4th web space.      Small area sub 3rd MPJ fat layer exposed, no tracking through this area.        Positive edema, erythema and calor.        This is partly due to compensating for hallux rigidus/area of previous foot surgery    Neurological   Gross sensation intact, paresthesias left foot, takes Neurontin    Musculoskeletal   Muscle Strength/Testing and Tone:  Intact, normal tone bilateral   Joints, Bones, and Muscles:  Limited range of motion 1st MPJ, arthritic degenerative changes left foot        Walks well an assisted          Assessment:       Encounter Diagnoses   Name Primary?    Cellulitis of left foot Yes    Skin ulcer of left foot, limited to  breakdown of skin          Plan:       Jerry was seen today for follow-up, foot problem and foot ulcer.    Diagnoses and all orders for this visit:    Cellulitis of left foot    Skin ulcer of left foot, limited to breakdown of skin        Following evaluation patient is going to continue taking clindamycin as directed he is going to finish taking this patient's culture and sensitivity is currently pending however he had does have a significant reduction in previously noted erythema edema and drainage sub 3rd metatarsal head left foot.  Mild non excisional debridement was performed on the area removing some of the nonviable callus tissue from the edges of the wound however this was limited patient still has discomfort upon palpation of the area I am going to recommend following up with the patient in 2 weeks I did place a metatarsal pad on the patient's left insole to offload pressure from this area it is obvious that this began as a pressure ulcer which led to subsequent infection he is going to finish taking his clindamycin as directed follow-up 2 weeks any increased redness swelling pain patient is to contact us immediately I have advised him it is a good sign his reduction in discomfort as the infection is being controlled.  Follow up 10/15/2018 Dr. Gentry Flores.      This note was created using [employer] voice recognition software that occasionally misinterpreted phrases or words.

## 2018-11-20 RX ORDER — GABAPENTIN 300 MG/1
CAPSULE ORAL
Qty: 90 CAPSULE | Refills: 3 | Status: SHIPPED | OUTPATIENT
Start: 2018-11-20 | End: 2022-11-07

## 2019-01-07 RX ORDER — DICLOFENAC SODIUM 75 MG/1
TABLET, DELAYED RELEASE ORAL
Qty: 60 TABLET | Refills: 2 | Status: SHIPPED | OUTPATIENT
Start: 2019-01-07 | End: 2019-06-03 | Stop reason: SDUPTHER

## 2019-01-23 ENCOUNTER — HOSPITAL ENCOUNTER (OUTPATIENT)
Dept: RADIOLOGY | Facility: HOSPITAL | Age: 59
Discharge: HOME | End: 2019-01-23
Attending: PODIATRIST
Payer: COMMERCIAL

## 2019-01-23 ENCOUNTER — OFFICE VISIT (OUTPATIENT)
Dept: PODIATRY | Facility: CLINIC | Age: 59
End: 2019-01-23
Payer: COMMERCIAL

## 2019-01-23 VITALS
SYSTOLIC BLOOD PRESSURE: 110 MMHG | DIASTOLIC BLOOD PRESSURE: 74 MMHG | HEIGHT: 75 IN | BODY MASS INDEX: 27.35 KG/M2 | TEMPERATURE: 98 F | WEIGHT: 220 LBS | HEART RATE: 70 BPM

## 2019-01-23 DIAGNOSIS — L03.116 CELLULITIS OF LEFT FOOT: Primary | ICD-10-CM

## 2019-01-23 DIAGNOSIS — M20.42 HAMMER TOE OF LEFT FOOT: ICD-10-CM

## 2019-01-23 DIAGNOSIS — L97.521 SKIN ULCER OF LEFT FOOT, LIMITED TO BREAKDOWN OF SKIN: ICD-10-CM

## 2019-01-23 PROCEDURE — 87186 SC STD MICRODIL/AGAR DIL: CPT

## 2019-01-23 PROCEDURE — 99214 OFFICE O/P EST MOD 30 MIN: CPT | Mod: S$GLB,,, | Performed by: PODIATRIST

## 2019-01-23 PROCEDURE — 99999 PR PBB SHADOW E&M-EST. PATIENT-LVL III: CPT | Mod: PBBFAC,,, | Performed by: PODIATRIST

## 2019-01-23 PROCEDURE — 73630 X-RAY EXAM OF FOOT: CPT | Mod: 26,LT,, | Performed by: RADIOLOGY

## 2019-01-23 PROCEDURE — 73630 XR FOOT COMPLETE 3 VIEW LEFT: ICD-10-PCS | Mod: 26,LT,, | Performed by: RADIOLOGY

## 2019-01-23 PROCEDURE — 3008F BODY MASS INDEX DOCD: CPT | Mod: S$GLB,,, | Performed by: PODIATRIST

## 2019-01-23 PROCEDURE — 99999 PR PBB SHADOW E&M-EST. PATIENT-LVL III: ICD-10-PCS | Mod: PBBFAC,,, | Performed by: PODIATRIST

## 2019-01-23 PROCEDURE — 87077 CULTURE AEROBIC IDENTIFY: CPT

## 2019-01-23 PROCEDURE — 73630 X-RAY EXAM OF FOOT: CPT | Mod: TC,FY,LT

## 2019-01-23 PROCEDURE — 99214 PR OFFICE/OUTPT VISIT, EST, LEVL IV, 30-39 MIN: ICD-10-PCS | Mod: S$GLB,,, | Performed by: PODIATRIST

## 2019-01-23 PROCEDURE — 87070 CULTURE OTHR SPECIMN AEROBIC: CPT

## 2019-01-23 PROCEDURE — 3008F PR BODY MASS INDEX (BMI) DOCUMENTED: ICD-10-PCS | Mod: S$GLB,,, | Performed by: PODIATRIST

## 2019-01-23 RX ORDER — SULFAMETHOXAZOLE AND TRIMETHOPRIM 400; 80 MG/1; MG/1
2 TABLET ORAL 2 TIMES DAILY
Qty: 56 TABLET | Refills: 0 | Status: SHIPPED | OUTPATIENT
Start: 2019-01-23 | End: 2019-02-06

## 2019-01-23 RX ORDER — HYDROCODONE BITARTRATE AND ACETAMINOPHEN 5; 325 MG/1; MG/1
2 TABLET ORAL 3 TIMES DAILY
Qty: 30 TABLET | Refills: 0 | Status: SHIPPED | OUTPATIENT
Start: 2019-01-23 | End: 2019-01-28

## 2019-01-26 PROBLEM — M20.42 HAMMER TOE OF LEFT FOOT: Status: ACTIVE | Noted: 2019-01-26

## 2019-01-27 NOTE — PROGRESS NOTES
Subjective:      Patient ID: Jerry Islas is a 58 y.o. male.    Chief Complaint: Foot Ulcer (Lt)    Patient presents for follow-up of an ulceration with infected wound on the plantar surface of the patient's left foot.  Patient states the area still very painful although it has gotten somewhat better.  Patient has a history of previous digital infection 2nd digit left.      ROS     Constitutional    Pleasant, well-nourished, no distress, well oriented    Cardiovascular          No chest pain, no shortness of breath    Respiratory           No cough, no congestion     Musculoskeletal        No muscle aches, no arthralgias/joint pain, no back pain, no swelling in the extremities    Neurologic         No weakness, no numbness    Psychiatric   No depression, no anxiety            Objective:      Physical Exam  Vascular         Arterial Pulses Right: posterior tibialis 2/4, dorsalis pedis 2/4, normal CFT   Arterial Pulses Left: posterior tibialis 2/4, dorsalis pedis 2/4, normal CFT   No lower extremity edema bilateral   Pedal skin temperature and color are normal bilateral     Integumentary   Patient has a discolored, hyperkeratotic lesion sub 3rd MPJ left foot with obvious underlying fluid.       Upon debridement there is an ulcer  towards 4th web space.      Small area sub 3rd MPJ fat layer exposed, no tracking through this area.        Positive edema, erythema and calor.        This is partly due to compensating for hallux rigidus/area of previous foot surgery    Neurological   Gross sensation intact, paresthesias left foot, takes Neurontin    Musculoskeletal   Muscle Strength/Testing and Tone:  Intact, normal tone bilateral   Joints, Bones, and Muscles:  Limited range of motion 1st MPJ, arthritic degenerative changes left foot        Walks well an assisted          Assessment:       Encounter Diagnoses   Name Primary?    Cellulitis of left foot Yes    Skin ulcer of left foot, limited to breakdown of skin      Hammer toe of left foot          Plan:       Jerry was seen today for foot ulcer.    Diagnoses and all orders for this visit:    Cellulitis of left foot  -     Aerobic culture    Skin ulcer of left foot, limited to breakdown of skin  -     X-Ray Foot Complete Left; Future  -     Aerobic culture    Hammer toe of left foot  -     Aerobic culture    Other orders  -     sulfamethoxazole-trimethoprim 400-80mg (BACTRIM,SEPTRA) 400-80 mg per tablet; Take 2 tablets by mouth 2 (two) times daily. for 14 days  -     HYDROcodone-acetaminophen (NORCO) 5-325 mg per tablet; Take 2 tablets by mouth 3 (three) times daily. for 5 days        Following evaluation x-rays were evaluated of the patient's left foot there are no signs of osteomyelitis bony breakdown or infection.  Patient has an ulceration underlying the 3rd and 4th metatarsal head region plantar forefoot left this is directly related to the patient's severely contracted digits on the left foot the 2nd digit on the left foot where the patient has had ulceration and infection before is stable at this time it is the plantar forefoot oral underlying the 3rd and 4th metatarsal heads that is broken down sharp excisional debridement of the area reveals a purulent drainage emitting from the area there is significant tenderness also noted in this area the actual area of breakdown is approximately 1 cm in diameter.  Patient advised pending the culture and sensitivity we will adjust his antibiotics in the meantime I am going to start the patient on Bactrim he is to flush this area every day with Dakin solution and apply a light padded dressing I did discuss surgery with the patient to address the digital contractures this is something he seriously has to consider doing down the road.  I did advise the patient he needs to make sure he has a good padded insole to offload pressure from the site of this area breakdown he relates it really just started about a week ago.  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  Total face-to-face time including discussion evaluation treatment wound care and non excisional debridement of the wound site an abscess equaled 30 min.      This note was created using [employer] voice recognition software that occasionally misinterpreted phrases or words.

## 2019-01-28 LAB — BACTERIA SPEC AEROBE CULT: NORMAL

## 2019-01-29 ENCOUNTER — TELEPHONE (OUTPATIENT)
Dept: PODIATRY | Facility: CLINIC | Age: 59
End: 2019-01-29

## 2019-01-29 NOTE — TELEPHONE ENCOUNTER
----- Message from Gentry Flores DPM sent at 1/28/2019  4:42 PM CST -----  Please call the patient regarding his abnormal result.Advise + staph he is to finish Bactrim as rx.

## 2019-02-06 ENCOUNTER — HOSPITAL ENCOUNTER (OUTPATIENT)
Dept: RADIOLOGY | Facility: HOSPITAL | Age: 59
Discharge: HOME | End: 2019-02-06
Attending: PODIATRIST
Payer: COMMERCIAL

## 2019-02-06 ENCOUNTER — OFFICE VISIT (OUTPATIENT)
Dept: PODIATRY | Facility: CLINIC | Age: 59
End: 2019-02-06
Payer: COMMERCIAL

## 2019-02-06 VITALS
HEART RATE: 63 BPM | TEMPERATURE: 98 F | SYSTOLIC BLOOD PRESSURE: 110 MMHG | BODY MASS INDEX: 27.35 KG/M2 | DIASTOLIC BLOOD PRESSURE: 63 MMHG | RESPIRATION RATE: 18 BRPM | WEIGHT: 220 LBS | HEIGHT: 75 IN

## 2019-02-06 DIAGNOSIS — L97.521 SKIN ULCER OF LEFT FOOT, LIMITED TO BREAKDOWN OF SKIN: ICD-10-CM

## 2019-02-06 DIAGNOSIS — M20.42 HAMMER TOE OF LEFT FOOT: ICD-10-CM

## 2019-02-06 DIAGNOSIS — L03.116 CELLULITIS OF LEFT FOOT: ICD-10-CM

## 2019-02-06 DIAGNOSIS — L97.521 SKIN ULCER OF LEFT FOOT, LIMITED TO BREAKDOWN OF SKIN: Primary | ICD-10-CM

## 2019-02-06 PROCEDURE — 73630 XR FOOT COMPLETE 3 VIEW LEFT: ICD-10-PCS | Mod: 26,LT,, | Performed by: RADIOLOGY

## 2019-02-06 PROCEDURE — 99213 PR OFFICE/OUTPT VISIT, EST, LEVL III, 20-29 MIN: ICD-10-PCS | Mod: S$GLB,,, | Performed by: PODIATRIST

## 2019-02-06 PROCEDURE — 73630 X-RAY EXAM OF FOOT: CPT | Mod: TC,FY,LT

## 2019-02-06 PROCEDURE — 99213 OFFICE O/P EST LOW 20 MIN: CPT | Mod: S$GLB,,, | Performed by: PODIATRIST

## 2019-02-06 PROCEDURE — 99999 PR PBB SHADOW E&M-EST. PATIENT-LVL III: CPT | Mod: PBBFAC,,, | Performed by: PODIATRIST

## 2019-02-06 PROCEDURE — 73630 X-RAY EXAM OF FOOT: CPT | Mod: 26,LT,, | Performed by: RADIOLOGY

## 2019-02-06 PROCEDURE — 99999 PR PBB SHADOW E&M-EST. PATIENT-LVL III: ICD-10-PCS | Mod: PBBFAC,,, | Performed by: PODIATRIST

## 2019-02-06 PROCEDURE — 3008F BODY MASS INDEX DOCD: CPT | Mod: S$GLB,,, | Performed by: PODIATRIST

## 2019-02-06 PROCEDURE — 3008F PR BODY MASS INDEX (BMI) DOCUMENTED: ICD-10-PCS | Mod: S$GLB,,, | Performed by: PODIATRIST

## 2019-02-06 NOTE — LETTER
February 6, 2019      [facility]  [address] [place] [address]  Phone: [phone]  Fax: [phone]       Patient: Jerry Islas   YOB: 1960  Date of Visit: 02/06/2019    To Whom It May Concern:    Shahnaz Islas  was at [facility] on 02/06/2019. He may return to work/school on 02/07/2019 without restrictions. If you have any questions or concerns, or if I can be of further assistance, please do not hesitate to contact me.    Sincerely,    Valerie Staley MA

## 2019-03-19 ENCOUNTER — TELEPHONE (OUTPATIENT)
Dept: PODIATRY | Facility: CLINIC | Age: 59
End: 2019-03-19

## 2019-03-19 NOTE — TELEPHONE ENCOUNTER
----- Message from Colleen Gar sent at 3/19/2019  3:50 PM CDT -----  Contact: patient  Type:  Sooner Apoointment Request    Caller is requesting a sooner appointment.  Caller declined first available appointment listed below.  Caller will not accept being placed on the waitlist and is requesting a message be sent to doctor.    Name of Caller:  patient  When is the first available appointment?  04/01/2019  Symptoms:  Left foot pain  Best Call Back Number:  [phone]  Additional Information:

## 2019-04-09 ENCOUNTER — OFFICE VISIT (OUTPATIENT)
Dept: PODIATRY | Facility: CLINIC | Age: 59
End: 2019-04-09
Payer: COMMERCIAL

## 2019-04-09 VITALS
BODY MASS INDEX: 27.35 KG/M2 | DIASTOLIC BLOOD PRESSURE: 99 MMHG | HEIGHT: 75 IN | HEART RATE: 71 BPM | WEIGHT: 220 LBS | SYSTOLIC BLOOD PRESSURE: 164 MMHG

## 2019-04-09 DIAGNOSIS — M20.42 HAMMER TOE OF LEFT FOOT: ICD-10-CM

## 2019-04-09 DIAGNOSIS — M21.6X2 ACQUIRED EQUINUS DEFORMITY OF LEFT FOOT: ICD-10-CM

## 2019-04-09 DIAGNOSIS — S93.105S DISLOCATED TOE, LEFT, SEQUELA: ICD-10-CM

## 2019-04-09 DIAGNOSIS — L97.521 SKIN ULCER OF LEFT FOOT, LIMITED TO BREAKDOWN OF SKIN: Primary | ICD-10-CM

## 2019-04-09 PROCEDURE — 3008F BODY MASS INDEX DOCD: CPT | Mod: S$GLB,,, | Performed by: PODIATRIST

## 2019-04-09 PROCEDURE — 99999 PR PBB SHADOW E&M-EST. PATIENT-LVL III: ICD-10-PCS | Mod: PBBFAC,,, | Performed by: PODIATRIST

## 2019-04-09 PROCEDURE — 99213 OFFICE O/P EST LOW 20 MIN: CPT | Mod: S$GLB,,, | Performed by: PODIATRIST

## 2019-04-09 PROCEDURE — 3008F PR BODY MASS INDEX (BMI) DOCUMENTED: ICD-10-PCS | Mod: S$GLB,,, | Performed by: PODIATRIST

## 2019-04-09 PROCEDURE — 99213 PR OFFICE/OUTPT VISIT, EST, LEVL III, 20-29 MIN: ICD-10-PCS | Mod: S$GLB,,, | Performed by: PODIATRIST

## 2019-04-09 PROCEDURE — 99999 PR PBB SHADOW E&M-EST. PATIENT-LVL III: CPT | Mod: PBBFAC,,, | Performed by: PODIATRIST

## 2019-04-09 RX ORDER — LISINOPRIL 10 MG/1
TABLET ORAL
Refills: 2 | COMMUNITY
Start: 2019-01-07 | End: 2020-03-18

## 2019-04-09 RX ORDER — ZOLPIDEM TARTRATE 10 MG/1
TABLET ORAL
Refills: 5 | COMMUNITY
Start: 2019-03-18 | End: 2019-05-21

## 2019-04-13 PROBLEM — M21.6X2 ACQUIRED EQUINUS DEFORMITY OF LEFT FOOT: Status: ACTIVE | Noted: 2019-04-13

## 2019-04-13 PROBLEM — S93.105S: Status: ACTIVE | Noted: 2019-04-13

## 2019-04-14 NOTE — PROGRESS NOTES
Subjective:       Patient ID: Jerry Islas is a 59 y.o. male.    Chief Complaint: Follow-up; Foot Pain; Foot Problem; and Callouses    HPI patient presents today for follow-up of severe digital contracture with history of ulceration on the plantar forefoot left.  Patient states his conditions are getting progressively worse on the left foot it is making it difficult for him to even stand on his feet for any period of time or even work.  Review of Systems   Musculoskeletal: Positive for arthralgias, gait problem and joint swelling.   All other systems reviewed and are negative.      Objective:      Physical Exam   Constitutional: He appears well-developed and well-nourished.   Cardiovascular:   Pulses:       Dorsalis pedis pulses are 2+ on the right side, and 2+ on the left side.        Posterior tibial pulses are 1+ on the right side, and 1+ on the left side.   Pulmonary/Chest: Effort normal.   Musculoskeletal: He exhibits edema, tenderness and deformity.        Left foot: There is decreased range of motion and deformity.   Feet:   Right Foot:   Protective Sensation: 4 sites tested. 4 sites sensed.   Left Foot:   Protective Sensation: 4 sites tested. 4 sites sensed.   Skin Integrity: Positive for ulcer, skin breakdown, erythema, warmth, callus and dry skin.   Neurological: He is alert.   Skin: Skin is warm. Capillary refill takes less than 2 seconds.   Psychiatric: He has a normal mood and affect. His behavior is normal. Judgment and thought content normal.   Nursing note and vitals reviewed.          Assessment:       1. Skin ulcer of left foot, limited to breakdown of skin    2. Hammer toe of left foot    3. Acquired equinus deformity of left foot    4. Dislocated toe, left, sequela        Plan:       Following event extensive evaluation discussion patient's digital contractures are becoming progressively worse on the left foot the area where the patient had a fusion of the 1st MPJ is completely healed  completely pain-free however the 2nd digit has become progressively contracted there is a hyperkeratotic lesion and ulceration on the distal aspect of the 2nd digit the 3rd digit is grossly contracted it is dislocated at the metatarsophalangeal joint causing plantar flexion of the metatarsals making a very area a prominent area on the plantar forefoot that is hyperkeratotic pre ulcerative in nature and extremely painful this area had previously ulcerated become infected subsequently healed however added it is at risk for additional breakdown and ulceration with weight-bearing.  Patient indicated today that his job has significantly decreased he is being required to stand be on his feet a lot more it is becoming increasingly difficult for him to do this and to make it through the work day because of the severity of pain and discomfort I have advised the patient at some point he is going to need to have surgical intervention to address the severely contracted degenerative digits with plantar flexion of the metatarsals he will likely need repair and correction of the digital contractures possibly even metatarsal osteotomies to correct this.  Patient was made aware recovery will likely involve 4 weeks of nonweightbearing followed by several weeks of partial weight-bearing for gradual return to activity I have advised the patient he is putting himself at risk for further skin breakdown ulceration and infection the area on the left foot has ulcerated multiple times the tip of the 2nd toe already has a pre ulcerative callus on it.  Following a lengthy discussion evaluation patient states that he understands this he knows he is going to need surgical intervention in the meantime the patient needs to keep the left foot well protected.  Follow-up as needed further surgical consultation is needed.

## 2019-05-13 ENCOUNTER — OFFICE VISIT (OUTPATIENT)
Dept: PODIATRY | Facility: CLINIC | Age: 59
End: 2019-05-13
Payer: COMMERCIAL

## 2019-05-13 VITALS
WEIGHT: 220 LBS | OXYGEN SATURATION: 99 % | BODY MASS INDEX: 27.35 KG/M2 | RESPIRATION RATE: 18 BRPM | DIASTOLIC BLOOD PRESSURE: 89 MMHG | HEIGHT: 75 IN | HEART RATE: 67 BPM | SYSTOLIC BLOOD PRESSURE: 146 MMHG | TEMPERATURE: 97 F

## 2019-05-13 DIAGNOSIS — S93.105S DISLOCATED TOE, LEFT, SEQUELA: ICD-10-CM

## 2019-05-13 DIAGNOSIS — M21.6X2 PLANTARFLEXION DEFORMITY OF LEFT FOOT: ICD-10-CM

## 2019-05-13 DIAGNOSIS — M20.42 HAMMER TOE OF LEFT FOOT: Primary | ICD-10-CM

## 2019-05-13 PROCEDURE — 99999 PR PBB SHADOW E&M-EST. PATIENT-LVL IV: ICD-10-PCS | Mod: PBBFAC,,, | Performed by: PODIATRIST

## 2019-05-13 PROCEDURE — 99999 PR PBB SHADOW E&M-EST. PATIENT-LVL IV: CPT | Mod: PBBFAC,,, | Performed by: PODIATRIST

## 2019-05-13 PROCEDURE — 99213 PR OFFICE/OUTPT VISIT, EST, LEVL III, 20-29 MIN: ICD-10-PCS | Mod: S$GLB,,, | Performed by: PODIATRIST

## 2019-05-13 PROCEDURE — 99213 OFFICE O/P EST LOW 20 MIN: CPT | Mod: S$GLB,,, | Performed by: PODIATRIST

## 2019-05-13 PROCEDURE — 3008F PR BODY MASS INDEX (BMI) DOCUMENTED: ICD-10-PCS | Mod: S$GLB,,, | Performed by: PODIATRIST

## 2019-05-13 PROCEDURE — 3008F BODY MASS INDEX DOCD: CPT | Mod: S$GLB,,, | Performed by: PODIATRIST

## 2019-05-13 NOTE — LETTER
May 16, 2019      CALVIN Donovan, KRISTINA  [address] [facility] [place] [address]           [facility]  [address] [facility] [place] [address]  Phone: [phone]  Fax: [phone]          Patient: Jerry Islas   MR Number: [id number]   YOB: 1960   Date of Visit: 5/13/2019       Dear CALVIN Donovan:    Thank you for referring Jerry Islas to me for evaluation. Attached you will find relevant portions of my assessment and plan of care.    If you have questions, please do not hesitate to call me. I look forward to following Jerry Islas along with you.    Sincerely,    Cindy August  CC:  No Recipients    If you would like to receive this communication electronically, please contact [email or web address] or [phone] to request more information on [employer] access.    For providers and/or their staff who would like to refer a patient to [facility], please contact us through our one-stop-shop provider referral line, [facility], at [phone].    If you feel you have received this communication in error or would no longer like to receive these types of communications, please e-mail [email or web address]

## 2019-05-18 NOTE — PROGRESS NOTES
Subjective:       Patient ID: Jerry Islas is a 59 y.o. male.    Chief Complaint: Foot Pain    Foot Pain   Associated symptoms include arthralgias and joint swelling.    patient presents today for follow-up of severe digital contracture with history of ulceration on the plantar forefoot left.  Patient states his conditions are getting progressively worse on the left foot it is making it difficult for him to even stand on his feet for any period of time or even work.  Review of Systems   Musculoskeletal: Positive for arthralgias, gait problem and joint swelling.   All other systems reviewed and are negative.      Objective:      Physical Exam   Constitutional: He appears well-developed and well-nourished.   Cardiovascular:   Pulses:       Dorsalis pedis pulses are 2+ on the right side, and 2+ on the left side.        Posterior tibial pulses are 1+ on the right side, and 1+ on the left side.   Pulmonary/Chest: Effort normal.   Musculoskeletal: He exhibits edema, tenderness and deformity.        Left foot: There is decreased range of motion and deformity.   Feet:   Right Foot:   Protective Sensation: 4 sites tested. 4 sites sensed.   Left Foot:   Protective Sensation: 4 sites tested. 4 sites sensed.   Skin Integrity: Positive for ulcer, skin breakdown, erythema, warmth, callus and dry skin.   Neurological: He is alert.   Skin: Skin is warm. Capillary refill takes less than 2 seconds.   Psychiatric: He has a normal mood and affect. His behavior is normal. Judgment and thought content normal.   Nursing note and vitals reviewed.          Assessment:       1. Hammer toe of left foot    2. Dislocated toe, left, sequela    3. Plantarflexion deformity of left foot        Plan:       Following event extensive evaluation discussion patient's digital contractures are becoming progressively worse on the left foot the area where the patient had a fusion of the 1st MPJ is completely healed completely pain-free however the 2nd  digit has become progressively contracted there is a hyperkeratotic lesion and ulceration on the distal aspect of the 2nd digit the 3rd digit is grossly contracted it is dislocated at the metatarsophalangeal joint causing plantar flexion of the metatarsals making a very area a prominent area on the plantar forefoot that is hyperkeratotic pre ulcerative in nature and extremely painful this area had previously ulcerated become infected subsequently healed however added it is at risk for additional breakdown and ulceration with weight-bearing.  Patient indicated today that his job has significantly decreased he is being required to stand be on his feet a lot more it is becoming increasingly difficult for him to do this and to make it through the work day because of the severity of pain and discomfort I have advised the patient at some point he is going to need to have surgical intervention to address the severely contracted degenerative digits with plantar flexion of the metatarsals he will likely need repair and correction of the digital contractures possibly even metatarsal osteotomies to correct this.  Patient was made aware recovery will likely involve 4 weeks of nonweightbearing followed by several weeks of partial weight-bearing for gradual return to activity I have advised the patient he is putting himself at risk for further skin breakdown ulceration and infection the area on the left foot has ulcerated multiple times the tip of the 2nd toe already has a pre ulcerative callus on it.  Following a lengthy discussion evaluation patient states that he understands this he knows he is going to need surgical intervention in the meantime the patient needs to keep the left foot well protected.  Patient indicated he has gotten to a point where he knows he needs to have something done he is in almost constant pain he cannot stand to walk or be on his feet for any period of time he would like to pursue surgery to address  the left foot as soon as possible we discussed at length and in detail surgical correction of digits 234 and 5 of the left foot with metatarsal osteotomy as needed to relieve plantar flexion of the metatarsals patient understands he will likely be nonweightbearing for 4 weeks followed by several weeks of partial weight-bearing before gradual return to activity.  We have tentatively schedule surgery for May 24, 2019 I will see the patient for a preoperative evaluation prior to this time.

## 2019-05-21 ENCOUNTER — LAB VISIT (OUTPATIENT)
Dept: LAB | Facility: HOSPITAL | Age: 59
End: 2019-05-21
Attending: PODIATRIST
Payer: COMMERCIAL

## 2019-05-21 ENCOUNTER — TELEPHONE (OUTPATIENT)
Dept: PODIATRY | Facility: CLINIC | Age: 59
End: 2019-05-21

## 2019-05-21 ENCOUNTER — OFFICE VISIT (OUTPATIENT)
Dept: PODIATRY | Facility: CLINIC | Age: 59
End: 2019-05-21
Payer: COMMERCIAL

## 2019-05-21 VITALS
TEMPERATURE: 98 F | DIASTOLIC BLOOD PRESSURE: 72 MMHG | HEART RATE: 75 BPM | WEIGHT: 220 LBS | BODY MASS INDEX: 27.35 KG/M2 | HEIGHT: 75 IN | SYSTOLIC BLOOD PRESSURE: 130 MMHG

## 2019-05-21 DIAGNOSIS — M19.079 OSTEOARTHRITIS OF ANKLE AND FOOT, UNSPECIFIED LATERALITY: ICD-10-CM

## 2019-05-21 DIAGNOSIS — S93.105S DISLOCATED TOE, LEFT, SEQUELA: ICD-10-CM

## 2019-05-21 DIAGNOSIS — Z01.818 PRE-OP EXAM: ICD-10-CM

## 2019-05-21 DIAGNOSIS — M20.42 HAMMER TOE OF LEFT FOOT: ICD-10-CM

## 2019-05-21 DIAGNOSIS — M21.6X2 PLANTARFLEXION DEFORMITY OF LEFT FOOT: ICD-10-CM

## 2019-05-21 DIAGNOSIS — M84.375G STRESS FRACTURE OF LEFT FOOT WITH DELAYED HEALING, SUBSEQUENT ENCOUNTER: ICD-10-CM

## 2019-05-21 DIAGNOSIS — M20.42 HAMMER TOE OF LEFT FOOT: Primary | ICD-10-CM

## 2019-05-21 LAB
ALBUMIN SERPL BCP-MCNC: 4.1 G/DL (ref 3.5–5.2)
ALP SERPL-CCNC: 73 U/L (ref 55–135)
ALT SERPL W/O P-5'-P-CCNC: 37 U/L (ref 10–44)
ANION GAP SERPL CALC-SCNC: 12 MMOL/L (ref 8–16)
AST SERPL-CCNC: 45 U/L (ref 10–40)
BASOPHILS # BLD AUTO: 0.09 K/UL (ref 0–0.2)
BASOPHILS NFR BLD: 1.8 % (ref 0–1.9)
BILIRUB SERPL-MCNC: 0.6 MG/DL (ref 0.1–1)
BUN SERPL-MCNC: 18 MG/DL (ref 8–23)
CALCIUM SERPL-MCNC: 9.2 MG/DL (ref 8.7–10.5)
CHLORIDE SERPL-SCNC: 103 MMOL/L (ref 95–110)
CO2 SERPL-SCNC: 20 MMOL/L (ref 23–29)
CREAT SERPL-MCNC: 1.1 MG/DL (ref 0.5–1.4)
DIFFERENTIAL METHOD: ABNORMAL
EOSINOPHIL # BLD AUTO: 0.3 K/UL (ref 0–0.5)
EOSINOPHIL NFR BLD: 6.7 % (ref 0–8)
ERYTHROCYTE [DISTWIDTH] IN BLOOD BY AUTOMATED COUNT: 11.6 % (ref 11.5–14.5)
EST. GFR  (AFRICAN AMERICAN): >60 ML/MIN/1.73 M^2
EST. GFR  (NON AFRICAN AMERICAN): >60 ML/MIN/1.73 M^2
GLUCOSE SERPL-MCNC: 100 MG/DL (ref 70–110)
HCT VFR BLD AUTO: 39 % (ref 40–54)
HGB BLD-MCNC: 13.7 G/DL (ref 14–18)
IMM GRANULOCYTES # BLD AUTO: 0.01 K/UL (ref 0–0.04)
IMM GRANULOCYTES NFR BLD AUTO: 0.2 % (ref 0–0.5)
LYMPHOCYTES # BLD AUTO: 1.3 K/UL (ref 1–4.8)
LYMPHOCYTES NFR BLD: 25.6 % (ref 18–48)
MCH RBC QN AUTO: 33.9 PG (ref 27–31)
MCHC RBC AUTO-ENTMCNC: 35.1 G/DL (ref 32–36)
MCV RBC AUTO: 97 FL (ref 82–98)
MONOCYTES # BLD AUTO: 0.7 K/UL (ref 0.3–1)
MONOCYTES NFR BLD: 13.7 % (ref 4–15)
NEUTROPHILS # BLD AUTO: 2.5 K/UL (ref 1.8–7.7)
NEUTROPHILS NFR BLD: 52 % (ref 38–73)
NRBC BLD-RTO: 0 /100 WBC
PLATELET # BLD AUTO: 155 K/UL (ref 150–350)
PMV BLD AUTO: 10.5 FL (ref 9.2–12.9)
POTASSIUM SERPL-SCNC: 4.3 MMOL/L (ref 3.5–5.1)
PROT SERPL-MCNC: 6.6 G/DL (ref 6–8.4)
RBC # BLD AUTO: 4.04 M/UL (ref 4.6–6.2)
SODIUM SERPL-SCNC: 135 MMOL/L (ref 136–145)
WBC # BLD AUTO: 4.89 K/UL (ref 3.9–12.7)

## 2019-05-21 PROCEDURE — 3008F PR BODY MASS INDEX (BMI) DOCUMENTED: ICD-10-PCS | Mod: S$GLB,,, | Performed by: PODIATRIST

## 2019-05-21 PROCEDURE — 99215 PR OFFICE/OUTPT VISIT, EST, LEVL V, 40-54 MIN: ICD-10-PCS | Mod: S$GLB,,, | Performed by: PODIATRIST

## 2019-05-21 PROCEDURE — 85025 COMPLETE CBC W/AUTO DIFF WBC: CPT

## 2019-05-21 PROCEDURE — 99999 PR PBB SHADOW E&M-EST. PATIENT-LVL IV: CPT | Mod: PBBFAC,,, | Performed by: PODIATRIST

## 2019-05-21 PROCEDURE — 3008F BODY MASS INDEX DOCD: CPT | Mod: S$GLB,,, | Performed by: PODIATRIST

## 2019-05-21 PROCEDURE — 80053 COMPREHEN METABOLIC PANEL: CPT

## 2019-05-21 PROCEDURE — 99999 PR PBB SHADOW E&M-EST. PATIENT-LVL IV: ICD-10-PCS | Mod: PBBFAC,,, | Performed by: PODIATRIST

## 2019-05-21 PROCEDURE — 36415 COLL VENOUS BLD VENIPUNCTURE: CPT

## 2019-05-21 PROCEDURE — 99215 OFFICE O/P EST HI 40 MIN: CPT | Mod: S$GLB,,, | Performed by: PODIATRIST

## 2019-05-21 RX ORDER — OXYCODONE AND ACETAMINOPHEN 5; 325 MG/1; MG/1
2 TABLET ORAL
Qty: 84 TABLET | Refills: 0 | Status: SHIPPED | OUTPATIENT
Start: 2019-05-21 | End: 2019-05-28

## 2019-05-21 RX ORDER — SULFAMETHOXAZOLE AND TRIMETHOPRIM 400; 80 MG/1; MG/1
2 TABLET ORAL 2 TIMES DAILY
Qty: 56 TABLET | Refills: 0 | Status: SHIPPED | OUTPATIENT
Start: 2019-05-21 | End: 2019-06-04

## 2019-05-21 RX ORDER — PROMETHAZINE HYDROCHLORIDE 12.5 MG/1
12.5 TABLET ORAL 3 TIMES DAILY
Qty: 30 TABLET | Refills: 1 | Status: SHIPPED | OUTPATIENT
Start: 2019-05-21 | End: 2019-05-31

## 2019-05-21 NOTE — PROGRESS NOTES
Subjective:       Patient ID: Jerry Islas is a 59 y.o. male.    Chief Complaint: Follow-up; Foot Pain; Foot Ulcer; Foot Problem; and Callouses  Patient presents today for follow-up of severe digital contracture with history of ulceration on the plantar forefoot left.  Patient states his conditions are getting progressively worse on the left foot it is making it difficult for him to even stand on his feet for any period of time or even work.  Foot Pain   Associated symptoms include arthralgias and joint swelling.   Foot Ulcer   Associated symptoms include arthralgias and joint swelling.      Review of Systems   Musculoskeletal: Positive for arthralgias, gait problem and joint swelling.   All other systems reviewed and are negative.      Objective:      Physical Exam   Constitutional: He appears well-developed and well-nourished.   Cardiovascular: Normal rate, regular rhythm and normal heart sounds.   Pulses:       Dorsalis pedis pulses are 2+ on the right side, and 2+ on the left side.        Posterior tibial pulses are 1+ on the right side, and 1+ on the left side.   Pulmonary/Chest: Effort normal and breath sounds normal.   Musculoskeletal: He exhibits edema, tenderness and deformity.        Left foot: There is decreased range of motion and deformity.   Feet:   Right Foot:   Protective Sensation: 4 sites tested. 4 sites sensed.   Left Foot:   Protective Sensation: 4 sites tested. 4 sites sensed.   Skin Integrity: Positive for ulcer, skin breakdown, erythema, warmth, callus and dry skin.   Neurological: He is alert.   Skin: Skin is warm. Capillary refill takes less than 2 seconds.   Psychiatric: He has a normal mood and affect. His behavior is normal. Judgment and thought content normal.   Nursing note and vitals reviewed.                  Contains abnormal data Comprehensive metabolic panel   Order: [id number]   Status:  Final result   Visible to patient:  No (Not Released) Next appt:  06/04/2019 at 08:00 AM in  Podiatry (Gentry Flores DPM) Dx:  Pre-op exam; Dislocated toe, left, se...    Ref Range & Units 1d ago   Sodium 136 - 145 mmol/L 135Low     Potassium 3.5 - 5.1 mmol/L 4.3    Chloride 95 - 110 mmol/L 103    CO2 23 - 29 mmol/L 20Low     Glucose 70 - 110 mg/dL 100    BUN, Bld 8 - 23 mg/dL 18    Creatinine 0.5 - 1.4 mg/dL 1.1    Calcium 8.7 - 10.5 mg/dL 9.2    Total Protein 6.0 - 8.4 g/dL 6.6    Albumin 3.5 - 5.2 g/dL 4.1    Total Bilirubin 0.1 - 1.0 mg/dL 0.6    Comment: For infants and newborns, interpretation of results should be based   on gestational age, weight and in agreement with clinical   observations.   Premature Infant recommended reference ranges:   Up to 24 hours.............<8.0 mg/dL   Up to 48 hours............<12.0 mg/dL   3-5 days..................<15.0 mg/dL   6-29 days.................<15.0 mg/dL    Alkaline Phosphatase 55 - 135 U/L 73    AST 10 - 40 U/L 45High     ALT 10 - 44 U/L 37    Anion Gap 8 - 16 mmol/L 12    eGFR if African American >60 mL/min/1.73 m^2 >60.0    eGFR if non African American >60 mL/min/1.73 m^2 >60.0    Comment: Calculation used to obtain the estimated glomerular filtration   rate (eGFR) is the CKD-EPI equation.    [facility]         Specimen Collected: 05/21/19 11:31 Last Resulted: 05/21/19 17:06 Lab Flowsheet Order Details View Encounter Lab and Collection Details            Contains abnormal data CBC auto differential   Order: [id number]   Status:  Final result   Visible to patient:  No (Not Released)   Next appt:  06/04/2019 at 08:00 AM in Podiatry (Gentry Flores DPM)   Dx:  Pre-op exam; Dislocated toe, left, se...    Ref Range & Units 1d ago   WBC 3.90 - 12.70 K/uL 4.89    RBC 4.60 - 6.20 M/uL 4.04Low     Hemoglobin 14.0 - 18.0 g/dL 13.7Low     Hematocrit 40.0 - 54.0 % 39.0Low     Mean Corpuscular Volume 82 - 98 fL 97    Mean Corpuscular Hemoglobin 27.0 - 31.0 pg 33.9High     Mean Corpuscular Hemoglobin Conc 32.0 - 36.0 g/dL 35.1    RDW 11.5 -  14.5 % 11.6    Platelets 150 - 350 K/uL 155    MPV 9.2 - 12.9 fL 10.5    Immature Granulocytes 0.0 - 0.5 % 0.2    Gran # (ANC) 1.8 - 7.7 K/uL 2.5    Immature Grans (Abs) 0.00 - 0.04 K/uL 0.01    Comment: Mild elevation in immature granulocytes is non specific and   can be seen in a variety of conditions including stress response,   acute inflammation, trauma and pregnancy. Correlation with other   laboratory and clinical findings is essential.    Lymph # 1.0 - 4.8 K/uL 1.3    Mono # 0.3 - 1.0 K/uL 0.7    Eos # 0.0 - 0.5 K/uL 0.3    Baso # 0.00 - 0.20 K/uL 0.09    nRBC 0 /100 WBC 0    Gran% 38.0 - 73.0 % 52.0    Lymph% 18.0 - 48.0 % 25.6    Mono% 4.0 - 15.0 % 13.7    Eosinophil% 0.0 - 8.0 % 6.7    Basophil% 0.0 - 1.9 % 1.8    Differential Method  Automated    Resulting Agency  [facility]         Specimen Collected: 05/21/19 11:31 Last Resulted: 05/21/19 16:53 Lab Flowsheet Order Details View Encounter Lab and Collection Details Routing               Assessment:       1. Hammer toe of left foot    2. Plantarflexion deformity of left foot    3. Dislocated toe, left, sequela    4. Stress fracture of left foot with delayed healing, subsequent encounter    5. Osteoarthritis of ankle and foot, unspecified laterality    6. Pre-op exam        Plan:       Following event extensive evaluation discussion patient's digital contractures are becoming progressively worse on the left foot the area where the patient had a fusion of the 1st MPJ is completely healed completely pain-free however the 2nd digit has become progressively contracted there is a hyperkeratotic lesion and ulceration on the distal aspect of the 2nd digit the 3rd digit is grossly contracted it is dislocated at the metatarsophalangeal joint causing plantar flexion of the metatarsals making a very area a prominent area on the plantar forefoot that is hyperkeratotic pre ulcerative in nature and extremely painful this area had previously ulcerated become infected  subsequently healed however added it is at risk for additional breakdown and ulceration with weight-bearing.  Patient indicated today that his job has significantly decreased he is being required to stand be on his feet a lot more it is becoming increasingly difficult for him to do this and to make it through the work day because of the severity of pain and discomfort I have advised the patient at some point he is going to need to have surgical intervention to address the severely contracted degenerative digits with plantar flexion of the metatarsals he will likely need repair and correction of the digital contractures possibly even metatarsal osteotomies to correct this.  Patient was made aware recovery will likely involve 4 weeks of nonweightbearing followed by several weeks of partial weight-bearing for gradual return to activity I have advised the patient he is putting himself at risk for further skin breakdown ulceration and infection the area on the left foot has ulcerated multiple times the tip of the 2nd toe already has a pre ulcerative callus on it.  Following a lengthy discussion evaluation patient states that he understands this he knows he is going to need surgical intervention in the meantime the patient needs to keep the left foot well protected.  Patient indicated he has gotten to a point where he knows he needs to have something done he is in almost constant pain he cannot stand to walk or be on his feet for any period of time he would like to pursue surgery to address the left foot as soon as possible we discussed at length and in detail surgical correction of digits 234 and 5 of the left foot with metatarsal osteotomy as needed to relieve plantar flexion of the metatarsals patient understands he will likely be nonweightbearing for 4 weeks followed by several weeks of partial weight-bearing before gradual return to activity.  Patient may decision for surgery today following consultation and review  of x-rays.Patient presents today for preoperative history and physical in discussion all aspects of surgery were discussed with the patient no guarantees were given written or implied all potential complications including but not limited to delayed healing nonhealing postoperative pain infection recurrence were discussed with the patient in detail. All aspect of the patient's recovery time involved in recovery patient responsibility is involving recovery were also discussed in detail. Patient advised failure to comply with postoperative care will jeopardize surgical outcome.  All aspects of surgical intervention discussed at length and in detail x-rays reviewed in detail I outlined each of the procedures that would be performed including metatarsal osteotomy as necessary condylectomy digital contracture correction digits 234 and 5 of the left foot fixation as necessary.  Patient's surgery has been scheduled for May 24, 2019 patient has been advised to contact me with any problems questions or concerns prior to surgery.  Preoperative lab work has been ordered will be reviewed prior to surgery patient was dispensed prescriptions for a knee walker Percocet Bactrim and Phenergan to be taken as directed.  Total face-to-face time including discussion evaluation treatment review of x-rays surgical consultation and decision for surgery today equaled 45 min.  Patient has had previous multiple areas of ulceration and infection due to breakdown with ulceration and understands this is going to continue to happen until these deformities are addressed.    This note was created using [employer] voice recognition software that occasionally misinterpreted phrases or words.

## 2019-05-21 NOTE — TELEPHONE ENCOUNTER
----- Message from Naomi Ansari sent at 5/21/2019 11:10 AM CDT -----  Contact: wife Maite Islas   Patient wife need to speak to nurse regarding [employer] paper work     Patient wife requesting to speak with Shahnaz please if possible       Please call [phone] jeanie Arora

## 2019-05-21 NOTE — LETTER
May 21, 2019      CALVIN Donovan, KRISTINA  [address]  [place] [address]           [facility]  [place] [facility]  [address] [place] [address]  Phone: [phone]  Fax: [phone]          Patient: Jerry Islas   MR Number: [id number]   YOB: 1960   Date of Visit: 5/21/2019       Dear CALVIN Donovan:    Thank you for referring Jerry Islas to me for evaluation. Attached you will find relevant portions of my assessment and plan of care.    If you have questions, please do not hesitate to call me. I look forward to following Jerry Islas along with you.    Sincerely,    Gentry Flores, KIRSTIE    Enclosure  CC:  No Recipients    If you would like to receive this communication electronically, please contact [email or web address] or [phone] to request more information on [employer] access.    For providers and/or their staff who would like to refer a patient to [facility], please contact us through our one-stop-shop provider referral line, [facility], at [phone].    If you feel you have received this communication in error or would no longer like to receive these types of communications, please e-mail [email or web address]

## 2019-05-21 NOTE — PATIENT INSTRUCTIONS
Nothing to eat or drink after midnight.  If you take medication for high blood pressure take this in the morning with a sip of water prior to surgery.     Arrive at out patient registration at 9:00 am

## 2019-05-21 NOTE — H&P (VIEW-ONLY)
Subjective:       Patient ID: Jerry Islas is a 59 y.o. male.    Chief Complaint: Follow-up; Foot Pain; Foot Ulcer; Foot Problem; and Callouses  Patient presents today for follow-up of severe digital contracture with history of ulceration on the plantar forefoot left.  Patient states his conditions are getting progressively worse on the left foot it is making it difficult for him to even stand on his feet for any period of time or even work.  Foot Pain   Associated symptoms include arthralgias and joint swelling.   Foot Ulcer   Associated symptoms include arthralgias and joint swelling.      Review of Systems   Musculoskeletal: Positive for arthralgias, gait problem and joint swelling.   All other systems reviewed and are negative.      Objective:      Physical Exam   Constitutional: He appears well-developed and well-nourished.   Cardiovascular: Normal rate, regular rhythm and normal heart sounds.   Pulses:       Dorsalis pedis pulses are 2+ on the right side, and 2+ on the left side.        Posterior tibial pulses are 1+ on the right side, and 1+ on the left side.   Pulmonary/Chest: Effort normal and breath sounds normal.   Musculoskeletal: He exhibits edema, tenderness and deformity.        Left foot: There is decreased range of motion and deformity.   Feet:   Right Foot:   Protective Sensation: 4 sites tested. 4 sites sensed.   Left Foot:   Protective Sensation: 4 sites tested. 4 sites sensed.   Skin Integrity: Positive for ulcer, skin breakdown, erythema, warmth, callus and dry skin.   Neurological: He is alert.   Skin: Skin is warm. Capillary refill takes less than 2 seconds.   Psychiatric: He has a normal mood and affect. His behavior is normal. Judgment and thought content normal.   Nursing note and vitals reviewed.                  Contains abnormal data Comprehensive metabolic panel   Order: [id number]   Status:  Final result   Visible to patient:  No (Not Released) Next appt:  06/04/2019 at 08:00 AM in  Podiatry (Gentry Flores DPM) Dx:  Pre-op exam; Dislocated toe, left, se...    Ref Range & Units 1d ago   Sodium 136 - 145 mmol/L 135Low     Potassium 3.5 - 5.1 mmol/L 4.3    Chloride 95 - 110 mmol/L 103    CO2 23 - 29 mmol/L 20Low     Glucose 70 - 110 mg/dL 100    BUN, Bld 8 - 23 mg/dL 18    Creatinine 0.5 - 1.4 mg/dL 1.1    Calcium 8.7 - 10.5 mg/dL 9.2    Total Protein 6.0 - 8.4 g/dL 6.6    Albumin 3.5 - 5.2 g/dL 4.1    Total Bilirubin 0.1 - 1.0 mg/dL 0.6    Comment: For infants and newborns, interpretation of results should be based   on gestational age, weight and in agreement with clinical   observations.   Premature Infant recommended reference ranges:   Up to 24 hours.............<8.0 mg/dL   Up to 48 hours............<12.0 mg/dL   3-5 days..................<15.0 mg/dL   6-29 days.................<15.0 mg/dL    Alkaline Phosphatase 55 - 135 U/L 73    AST 10 - 40 U/L 45High     ALT 10 - 44 U/L 37    Anion Gap 8 - 16 mmol/L 12    eGFR if African American >60 mL/min/1.73 m^2 >60.0    eGFR if non African American >60 mL/min/1.73 m^2 >60.0    Comment: Calculation used to obtain the estimated glomerular filtration   rate (eGFR) is the CKD-EPI equation.    [facility]         Specimen Collected: 05/21/19 11:31 Last Resulted: 05/21/19 17:06 Lab Flowsheet Order Details View Encounter Lab and Collection Details            Contains abnormal data CBC auto differential   Order: [id number]   Status:  Final result   Visible to patient:  No (Not Released)   Next appt:  06/04/2019 at 08:00 AM in Podiatry (Gentry Flores DPM)   Dx:  Pre-op exam; Dislocated toe, left, se...    Ref Range & Units 1d ago   WBC 3.90 - 12.70 K/uL 4.89    RBC 4.60 - 6.20 M/uL 4.04Low     Hemoglobin 14.0 - 18.0 g/dL 13.7Low     Hematocrit 40.0 - 54.0 % 39.0Low     Mean Corpuscular Volume 82 - 98 fL 97    Mean Corpuscular Hemoglobin 27.0 - 31.0 pg 33.9High     Mean Corpuscular Hemoglobin Conc 32.0 - 36.0 g/dL 35.1    RDW 11.5 -  14.5 % 11.6    Platelets 150 - 350 K/uL 155    MPV 9.2 - 12.9 fL 10.5    Immature Granulocytes 0.0 - 0.5 % 0.2    Gran # (ANC) 1.8 - 7.7 K/uL 2.5    Immature Grans (Abs) 0.00 - 0.04 K/uL 0.01    Comment: Mild elevation in immature granulocytes is non specific and   can be seen in a variety of conditions including stress response,   acute inflammation, trauma and pregnancy. Correlation with other   laboratory and clinical findings is essential.    Lymph # 1.0 - 4.8 K/uL 1.3    Mono # 0.3 - 1.0 K/uL 0.7    Eos # 0.0 - 0.5 K/uL 0.3    Baso # 0.00 - 0.20 K/uL 0.09    nRBC 0 /100 WBC 0    Gran% 38.0 - 73.0 % 52.0    Lymph% 18.0 - 48.0 % 25.6    Mono% 4.0 - 15.0 % 13.7    Eosinophil% 0.0 - 8.0 % 6.7    Basophil% 0.0 - 1.9 % 1.8    Differential Method  Automated    Resulting Agency  [facility]         Specimen Collected: 05/21/19 11:31 Last Resulted: 05/21/19 16:53 Lab Flowsheet Order Details View Encounter Lab and Collection Details Routing               Assessment:       1. Hammer toe of left foot    2. Plantarflexion deformity of left foot    3. Dislocated toe, left, sequela    4. Stress fracture of left foot with delayed healing, subsequent encounter    5. Osteoarthritis of ankle and foot, unspecified laterality    6. Pre-op exam        Plan:       Following event extensive evaluation discussion patient's digital contractures are becoming progressively worse on the left foot the area where the patient had a fusion of the 1st MPJ is completely healed completely pain-free however the 2nd digit has become progressively contracted there is a hyperkeratotic lesion and ulceration on the distal aspect of the 2nd digit the 3rd digit is grossly contracted it is dislocated at the metatarsophalangeal joint causing plantar flexion of the metatarsals making a very area a prominent area on the plantar forefoot that is hyperkeratotic pre ulcerative in nature and extremely painful this area had previously ulcerated become infected  subsequently healed however added it is at risk for additional breakdown and ulceration with weight-bearing.  Patient indicated today that his job has significantly decreased he is being required to stand be on his feet a lot more it is becoming increasingly difficult for him to do this and to make it through the work day because of the severity of pain and discomfort I have advised the patient at some point he is going to need to have surgical intervention to address the severely contracted degenerative digits with plantar flexion of the metatarsals he will likely need repair and correction of the digital contractures possibly even metatarsal osteotomies to correct this.  Patient was made aware recovery will likely involve 4 weeks of nonweightbearing followed by several weeks of partial weight-bearing for gradual return to activity I have advised the patient he is putting himself at risk for further skin breakdown ulceration and infection the area on the left foot has ulcerated multiple times the tip of the 2nd toe already has a pre ulcerative callus on it.  Following a lengthy discussion evaluation patient states that he understands this he knows he is going to need surgical intervention in the meantime the patient needs to keep the left foot well protected.  Patient indicated he has gotten to a point where he knows he needs to have something done he is in almost constant pain he cannot stand to walk or be on his feet for any period of time he would like to pursue surgery to address the left foot as soon as possible we discussed at length and in detail surgical correction of digits 234 and 5 of the left foot with metatarsal osteotomy as needed to relieve plantar flexion of the metatarsals patient understands he will likely be nonweightbearing for 4 weeks followed by several weeks of partial weight-bearing before gradual return to activity.  Patient may decision for surgery today following consultation and review  of x-rays.Patient presents today for preoperative history and physical in discussion all aspects of surgery were discussed with the patient no guarantees were given written or implied all potential complications including but not limited to delayed healing nonhealing postoperative pain infection recurrence were discussed with the patient in detail. All aspect of the patient's recovery time involved in recovery patient responsibility is involving recovery were also discussed in detail. Patient advised failure to comply with postoperative care will jeopardize surgical outcome.  All aspects of surgical intervention discussed at length and in detail x-rays reviewed in detail I outlined each of the procedures that would be performed including metatarsal osteotomy as necessary condylectomy digital contracture correction digits 234 and 5 of the left foot fixation as necessary.  Patient's surgery has been scheduled for May 24, 2019 patient has been advised to contact me with any problems questions or concerns prior to surgery.  Preoperative lab work has been ordered will be reviewed prior to surgery patient was dispensed prescriptions for a knee walker Percocet Bactrim and Phenergan to be taken as directed.  Total face-to-face time including discussion evaluation treatment review of x-rays surgical consultation and decision for surgery today equaled 45 min.  Patient has had previous multiple areas of ulceration and infection due to breakdown with ulceration and understands this is going to continue to happen until these deformities are addressed.    This note was created using [employer] voice recognition software that occasionally misinterpreted phrases or words.

## 2019-05-22 RX ORDER — SODIUM CHLORIDE, SODIUM LACTATE, POTASSIUM CHLORIDE, CALCIUM CHLORIDE 600; 310; 30; 20 MG/100ML; MG/100ML; MG/100ML; MG/100ML
INJECTION, SOLUTION INTRAVENOUS CONTINUOUS
Status: CANCELLED | OUTPATIENT
Start: 2019-05-24

## 2019-05-23 ENCOUNTER — TELEPHONE (OUTPATIENT)
Dept: PODIATRY | Facility: CLINIC | Age: 59
End: 2019-05-23

## 2019-05-23 NOTE — TELEPHONE ENCOUNTER
Advised paperwork is going to be faxed today. Another set of paperwork came in via fax provider is filling it out to fax back with the first set received. She verbalized understanding

## 2019-05-23 NOTE — TELEPHONE ENCOUNTER
----- Message from Maria Eugenia Mahan sent at 5/23/2019 10:39 AM CDT -----  Contact: [phone] spouse crys yanez   [phone] spouse crys yanez   Please call upon request  Concerning disability paper work for procedure on 5/24/19

## 2019-05-23 NOTE — TELEPHONE ENCOUNTER
----- Message from Caroline eWst sent at 5/23/2019 12:07 PM CDT -----  Contact: Nils  Calling to verify if you have received a fax and have sent it to two different numbers and not sure which is correct and please advise [phone] and Ref # [id number]

## 2019-05-23 NOTE — TELEPHONE ENCOUNTER
Advised Jan pinto [facility] that we didn't receive any faxes from them.  She is re-faxing paperwork now.

## 2019-05-24 ENCOUNTER — ANESTHESIA (OUTPATIENT)
Dept: SURGERY | Facility: HOSPITAL | Age: 59
End: 2019-05-24
Payer: COMMERCIAL

## 2019-05-24 ENCOUNTER — HOSPITAL ENCOUNTER (OUTPATIENT)
Facility: HOSPITAL | Age: 59
Discharge: HOME | End: 2019-05-24
Attending: PODIATRIST | Admitting: PODIATRIST
Payer: COMMERCIAL

## 2019-05-24 ENCOUNTER — ANESTHESIA EVENT (OUTPATIENT)
Dept: SURGERY | Facility: HOSPITAL | Age: 59
End: 2019-05-24
Payer: COMMERCIAL

## 2019-05-24 VITALS
RESPIRATION RATE: 15 BRPM | BODY MASS INDEX: 27.35 KG/M2 | HEART RATE: 69 BPM | TEMPERATURE: 98 F | DIASTOLIC BLOOD PRESSURE: 78 MMHG | OXYGEN SATURATION: 98 % | WEIGHT: 220 LBS | HEIGHT: 75 IN | SYSTOLIC BLOOD PRESSURE: 117 MMHG

## 2019-05-24 DIAGNOSIS — M19.079 OSTEOARTHRITIS OF ANKLE AND FOOT, UNSPECIFIED LATERALITY: Primary | ICD-10-CM

## 2019-05-24 DIAGNOSIS — S93.105S DISLOCATED TOE, LEFT, SEQUELA: ICD-10-CM

## 2019-05-24 DIAGNOSIS — M21.6X2 ACQUIRED EQUINUS DEFORMITY OF LEFT FOOT: ICD-10-CM

## 2019-05-24 DIAGNOSIS — M21.6X2 PLANTARFLEXION DEFORMITY OF LEFT FOOT: ICD-10-CM

## 2019-05-24 DIAGNOSIS — M84.375G STRESS FRACTURE OF LEFT FOOT WITH DELAYED HEALING, SUBSEQUENT ENCOUNTER: ICD-10-CM

## 2019-05-24 DIAGNOSIS — M20.42 HAMMER TOE OF LEFT FOOT: ICD-10-CM

## 2019-05-24 PROCEDURE — S0028 INJECTION, FAMOTIDINE, 20 MG: HCPCS | Performed by: ANESTHESIOLOGY

## 2019-05-24 PROCEDURE — 36000709 HC OR TIME LEV III EA ADD 15 MIN: Performed by: PODIATRIST

## 2019-05-24 PROCEDURE — 28285 REPAIR OF HAMMERTOE: CPT | Mod: 51,T1,, | Performed by: PODIATRIST

## 2019-05-24 PROCEDURE — 71000015 HC POSTOP RECOV 1ST HR: Performed by: PODIATRIST

## 2019-05-24 PROCEDURE — 25000003 PHARM REV CODE 250

## 2019-05-24 PROCEDURE — D9220A PRA ANESTHESIA: ICD-10-PCS | Mod: ANES,,, | Performed by: ANESTHESIOLOGY

## 2019-05-24 PROCEDURE — 27201423 OPTIME MED/SURG SUP & DEVICES STERILE SUPPLY: Performed by: PODIATRIST

## 2019-05-24 PROCEDURE — D9220A PRA ANESTHESIA: ICD-10-PCS | Mod: CRNA,,, | Performed by: NURSE ANESTHETIST, CERTIFIED REGISTERED

## 2019-05-24 PROCEDURE — 28308 PR OSTEOTOMY METATARSAL (NOT 1ST): ICD-10-PCS | Mod: 51,T2,, | Performed by: PODIATRIST

## 2019-05-24 PROCEDURE — C1769 GUIDE WIRE: HCPCS | Performed by: PODIATRIST

## 2019-05-24 PROCEDURE — 63600175 PHARM REV CODE 636 W HCPCS: Performed by: PODIATRIST

## 2019-05-24 PROCEDURE — 28288 PR PART REMV BONE METATARSAL HEAD,EA: ICD-10-PCS | Mod: T2,,, | Performed by: PODIATRIST

## 2019-05-24 PROCEDURE — 37000009 HC ANESTHESIA EA ADD 15 MINS: Performed by: PODIATRIST

## 2019-05-24 PROCEDURE — 28285 PR REPAIR OF HAMMERTOE,ONE: ICD-10-PCS | Mod: 51,T1,, | Performed by: PODIATRIST

## 2019-05-24 PROCEDURE — C1776 JOINT DEVICE (IMPLANTABLE): HCPCS | Performed by: PODIATRIST

## 2019-05-24 PROCEDURE — 25000003 PHARM REV CODE 250: Performed by: PODIATRIST

## 2019-05-24 PROCEDURE — D9220A PRA ANESTHESIA: Mod: ANES,,, | Performed by: ANESTHESIOLOGY

## 2019-05-24 PROCEDURE — 71000033 HC RECOVERY, INTIAL HOUR: Performed by: PODIATRIST

## 2019-05-24 PROCEDURE — S0020 INJECTION, BUPIVICAINE HYDRO: HCPCS

## 2019-05-24 PROCEDURE — 28308 INCISION OF METATARSAL: CPT | Mod: 51,T1,, | Performed by: PODIATRIST

## 2019-05-24 PROCEDURE — 28288 PARTIAL REMOVAL OF FOOT BONE: CPT | Mod: T2,,, | Performed by: PODIATRIST

## 2019-05-24 PROCEDURE — D9220A PRA ANESTHESIA: Mod: CRNA,,, | Performed by: NURSE ANESTHETIST, CERTIFIED REGISTERED

## 2019-05-24 PROCEDURE — 25000003 PHARM REV CODE 250: Performed by: ANESTHESIOLOGY

## 2019-05-24 PROCEDURE — 37000008 HC ANESTHESIA 1ST 15 MINUTES: Performed by: PODIATRIST

## 2019-05-24 PROCEDURE — 63600175 PHARM REV CODE 636 W HCPCS: Performed by: NURSE ANESTHETIST, CERTIFIED REGISTERED

## 2019-05-24 PROCEDURE — 36000708 HC OR TIME LEV III 1ST 15 MIN: Performed by: PODIATRIST

## 2019-05-24 PROCEDURE — C1713 ANCHOR/SCREW BN/BN,TIS/BN: HCPCS | Performed by: PODIATRIST

## 2019-05-24 PROCEDURE — 25000003 PHARM REV CODE 250: Performed by: NURSE ANESTHETIST, CERTIFIED REGISTERED

## 2019-05-24 DEVICE — IMPLANTABLE DEVICE: Type: IMPLANTABLE DEVICE | Site: FOOT | Status: FUNCTIONAL

## 2019-05-24 RX ORDER — MIDAZOLAM HYDROCHLORIDE 1 MG/ML
INJECTION, SOLUTION INTRAMUSCULAR; INTRAVENOUS
Status: DISCONTINUED | OUTPATIENT
Start: 2019-05-24 | End: 2019-05-24

## 2019-05-24 RX ORDER — LIDOCAINE HYDROCHLORIDE 10 MG/ML
1 INJECTION, SOLUTION EPIDURAL; INFILTRATION; INTRACAUDAL; PERINEURAL ONCE
Status: DISCONTINUED | OUTPATIENT
Start: 2019-05-24 | End: 2019-05-24 | Stop reason: HOSPADM

## 2019-05-24 RX ORDER — EPHEDRINE SULFATE 50 MG/ML
INJECTION, SOLUTION INTRAVENOUS
Status: DISCONTINUED | OUTPATIENT
Start: 2019-05-24 | End: 2019-05-24

## 2019-05-24 RX ORDER — FAMOTIDINE 10 MG/ML
INJECTION INTRAVENOUS
Status: DISCONTINUED
Start: 2019-05-24 | End: 2019-05-24 | Stop reason: HOSPADM

## 2019-05-24 RX ORDER — CEFAZOLIN SODIUM 2 G/50ML
2 SOLUTION INTRAVENOUS
Status: COMPLETED | OUTPATIENT
Start: 2019-05-24 | End: 2019-05-24

## 2019-05-24 RX ORDER — BUPIVACAINE HYDROCHLORIDE 5 MG/ML
INJECTION, SOLUTION PERINEURAL
Status: DISCONTINUED | OUTPATIENT
Start: 2019-05-24 | End: 2019-05-24 | Stop reason: HOSPADM

## 2019-05-24 RX ORDER — SODIUM CHLORIDE, SODIUM LACTATE, POTASSIUM CHLORIDE, CALCIUM CHLORIDE 600; 310; 30; 20 MG/100ML; MG/100ML; MG/100ML; MG/100ML
INJECTION, SOLUTION INTRAVENOUS CONTINUOUS PRN
Status: DISCONTINUED | OUTPATIENT
Start: 2019-05-24 | End: 2019-05-24

## 2019-05-24 RX ORDER — ONDANSETRON 2 MG/ML
INJECTION INTRAMUSCULAR; INTRAVENOUS
Status: DISCONTINUED | OUTPATIENT
Start: 2019-05-24 | End: 2019-05-24

## 2019-05-24 RX ORDER — SODIUM CHLORIDE, SODIUM LACTATE, POTASSIUM CHLORIDE, CALCIUM CHLORIDE 600; 310; 30; 20 MG/100ML; MG/100ML; MG/100ML; MG/100ML
125 INJECTION, SOLUTION INTRAVENOUS CONTINUOUS
Status: DISCONTINUED | OUTPATIENT
Start: 2019-05-24 | End: 2019-05-24 | Stop reason: HOSPADM

## 2019-05-24 RX ORDER — DIPHENHYDRAMINE HYDROCHLORIDE 50 MG/ML
12.5 INJECTION INTRAMUSCULAR; INTRAVENOUS
Status: DISCONTINUED | OUTPATIENT
Start: 2019-05-24 | End: 2019-05-24 | Stop reason: HOSPADM

## 2019-05-24 RX ORDER — PHENYLEPHRINE HYDROCHLORIDE 10 MG/ML
INJECTION INTRAVENOUS
Status: DISCONTINUED | OUTPATIENT
Start: 2019-05-24 | End: 2019-05-24

## 2019-05-24 RX ORDER — PROPOFOL 10 MG/ML
VIAL (ML) INTRAVENOUS
Status: DISCONTINUED | OUTPATIENT
Start: 2019-05-24 | End: 2019-05-24

## 2019-05-24 RX ORDER — ONDANSETRON 2 MG/ML
4 INJECTION INTRAMUSCULAR; INTRAVENOUS DAILY PRN
Status: DISCONTINUED | OUTPATIENT
Start: 2019-05-24 | End: 2019-05-24 | Stop reason: HOSPADM

## 2019-05-24 RX ORDER — LIDOCAINE HYDROCHLORIDE 10 MG/ML
INJECTION INFILTRATION; PERINEURAL
Status: DISCONTINUED | OUTPATIENT
Start: 2019-05-24 | End: 2019-05-24 | Stop reason: HOSPADM

## 2019-05-24 RX ORDER — CEFAZOLIN SODIUM 1 G/3ML
INJECTION, POWDER, FOR SOLUTION INTRAMUSCULAR; INTRAVENOUS
Status: DISCONTINUED
Start: 2019-05-24 | End: 2019-05-24 | Stop reason: HOSPADM

## 2019-05-24 RX ORDER — MORPHINE SULFATE 4 MG/ML
2 INJECTION, SOLUTION INTRAMUSCULAR; INTRAVENOUS EVERY 5 MIN PRN
Status: DISCONTINUED | OUTPATIENT
Start: 2019-05-24 | End: 2019-05-24 | Stop reason: HOSPADM

## 2019-05-24 RX ORDER — MEPERIDINE HYDROCHLORIDE 50 MG/ML
INJECTION INTRAMUSCULAR; INTRAVENOUS; SUBCUTANEOUS
Status: DISCONTINUED | OUTPATIENT
Start: 2019-05-24 | End: 2019-05-24

## 2019-05-24 RX ORDER — FAMOTIDINE 10 MG/ML
20 INJECTION INTRAVENOUS ONCE
Status: COMPLETED | OUTPATIENT
Start: 2019-05-24 | End: 2019-05-24

## 2019-05-24 RX ORDER — SODIUM CHLORIDE, SODIUM LACTATE, POTASSIUM CHLORIDE, CALCIUM CHLORIDE 600; 310; 30; 20 MG/100ML; MG/100ML; MG/100ML; MG/100ML
INJECTION, SOLUTION INTRAVENOUS CONTINUOUS
Status: DISCONTINUED | OUTPATIENT
Start: 2019-05-24 | End: 2019-05-24 | Stop reason: HOSPADM

## 2019-05-24 RX ADMIN — FAMOTIDINE 20 MG: 10 INJECTION INTRAVENOUS at 09:05

## 2019-05-24 RX ADMIN — PHENYLEPHRINE HYDROCHLORIDE 80 MCG: 10 INJECTION INTRAVENOUS at 12:05

## 2019-05-24 RX ADMIN — MEPERIDINE HYDROCHLORIDE 25 MG: 50 INJECTION INTRAMUSCULAR; INTRAVENOUS; SUBCUTANEOUS at 12:05

## 2019-05-24 RX ADMIN — EPHEDRINE SULFATE 25 MG: 50 INJECTION INTRAMUSCULAR; INTRAVENOUS; SUBCUTANEOUS at 10:05

## 2019-05-24 RX ADMIN — PROPOFOL 200 MG: 10 INJECTION, EMULSION INTRAVENOUS at 10:05

## 2019-05-24 RX ADMIN — EPHEDRINE SULFATE 15 MG: 50 INJECTION INTRAMUSCULAR; INTRAVENOUS; SUBCUTANEOUS at 10:05

## 2019-05-24 RX ADMIN — PHENYLEPHRINE HYDROCHLORIDE 80 MCG: 10 INJECTION INTRAVENOUS at 11:05

## 2019-05-24 RX ADMIN — PHENYLEPHRINE HYDROCHLORIDE 80 MCG: 10 INJECTION INTRAVENOUS at 10:05

## 2019-05-24 RX ADMIN — MEPERIDINE HYDROCHLORIDE 50 MG: 50 INJECTION INTRAMUSCULAR; INTRAVENOUS; SUBCUTANEOUS at 10:05

## 2019-05-24 RX ADMIN — MEPERIDINE HYDROCHLORIDE 25 MG: 50 INJECTION INTRAMUSCULAR; INTRAVENOUS; SUBCUTANEOUS at 11:05

## 2019-05-24 RX ADMIN — CEFAZOLIN SODIUM 2 G: 2 SOLUTION INTRAVENOUS at 10:05

## 2019-05-24 RX ADMIN — PHENYLEPHRINE HYDROCHLORIDE 80 MCG: 10 INJECTION INTRAVENOUS at 01:05

## 2019-05-24 RX ADMIN — EPHEDRINE SULFATE 10 MG: 50 INJECTION INTRAMUSCULAR; INTRAVENOUS; SUBCUTANEOUS at 10:05

## 2019-05-24 RX ADMIN — SODIUM CHLORIDE, POTASSIUM CHLORIDE, SODIUM LACTATE AND CALCIUM CHLORIDE: 600; 310; 30; 20 INJECTION, SOLUTION INTRAVENOUS at 10:05

## 2019-05-24 RX ADMIN — ONDANSETRON 4 MG: 2 INJECTION INTRAMUSCULAR; INTRAVENOUS at 10:05

## 2019-05-24 RX ADMIN — MIDAZOLAM HYDROCHLORIDE 2 MG: 1 INJECTION, SOLUTION INTRAMUSCULAR; INTRAVENOUS at 10:05

## 2019-05-24 NOTE — ANESTHESIA POSTPROCEDURE EVALUATION
Anesthesia Post Evaluation    Patient: Jerry Islas    Procedure(s) Performed: Procedure(s) (LRB):  OSTEOTOMY, METATARSAL BONE (Left)  CORRECTION, HAMMER TOE 2-5 (Left)    Final Anesthesia Type: general  Patient location during evaluation: PACU  Patient participation: Yes- Able to Participate  Level of consciousness: awake and alert  Post-procedure vital signs: reviewed and stable  Pain management: adequate  Airway patency: patent  PONV status at discharge: No PONV  Anesthetic complications: no      Cardiovascular status: blood pressure returned to baseline  Respiratory status: unassisted  Hydration status: euvolemic  Follow-up not needed.          Vitals Value Taken Time   /78 5/24/2019  2:17 PM   Temp 36.6 °C (97.9 °F) 5/24/2019  1:35 PM   Pulse 67 5/24/2019  2:17 PM   Resp 102 5/24/2019  1:35 PM   SpO2 97 % 5/24/2019  2:17 PM   Vitals shown include unvalidated device data.      Event Time     Out of Recovery 14:30:00          Pain/Thony Score: Thony Score: 10 (5/24/2019  2:15 PM)

## 2019-05-24 NOTE — TRANSFER OF CARE
"Anesthesia Transfer of Care Note    Patient: Jerry Islas    Procedure(s) Performed: Procedure(s) (LRB):  OSTEOTOMY, METATARSAL BONE (Left)  CORRECTION, HAMMER TOE 2-5 (Left)    Patient location: PACU    Anesthesia Type: general    Transport from OR: Transported from OR on room air with adequate spontaneous ventilation    Post pain: adequate analgesia    Post assessment: no apparent anesthetic complications and tolerated procedure well    Post vital signs: stable    Level of consciousness: sedated and responds to stimulation    Nausea/Vomiting: no nausea/vomiting    Complications: none    Transfer of care protocol was followed      Last vitals:   Visit Vitals  /69 (BP Location: Right arm, Patient Position: Lying)   Pulse 63   Temp 36.7 °C (98.1 °F) (Oral)   Resp 19   Ht 6' 3" (1.905 m)   Wt 99.8 kg (220 lb)   SpO2 99%   BMI 27.50 kg/m²     "

## 2019-05-24 NOTE — BRIEF OP NOTE
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]    Procedure(s) (LRB):  OSTEOTOMY, METATARSAL BONE (Left)  CORRECTION, HAMMER TOE 2-5 (Left)    Anesthesia: Choice    Description of the findings of the procedure:     Findings/Key Components:     Estimated Blood Loss: * No values recorded between 5/24/2019 10:50 AM and 5/24/2019  1:30 PM *         Specimens:   Specimen (12h ago, onward)    None          Discharge Note    SUMMARY     Admit Date: 5/24/2019    Discharge Date and Time:  05/24/2019 1:37 PM    Hospital Course (synopsis of major diagnoses, care, treatment, and services provided during the course of the hospital stay):      Final Diagnosis: Post-Op Diagnosis Codes:     * Hammer toe of left foot [M20.42]     * Dislocated toe, left, sequela [S93.105S]     * 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]    Disposition: Home or Self Care    Follow Up/Patient Instructions:     Medications:  Reconciled Home Medications:      Medication List      CONTINUE taking these medications    diclofenac 75 MG EC tablet  Commonly known as:  VOLTAREN  TAKE ONE TABLET BY MOUTH TWICE DAILY *TAKE WITH FOOD*     FLUCELVAX QUAD 9096-8041 (PF) 60 mcg (15 mcg x 4)/0.5 mL Syrg vaccine  Generic drug:  influenza  Flucelvax Quad 3168-7813 (PF) 60 mcg (15 mcg x 4)/0.5 mL IM syringe   ADM 0.5ML IM UTD     gabapentin 300 MG capsule  Commonly known as:  NEURONTIN  TAKE ONE CAPSULE BY MOUTH 3 TIMES DAILY AS DIRECTED FOR 30 DAYS     lisinopril 10 MG  tablet  TAKE ONE TABLET ONCE DAILY FOR BLOOD PRESSURE     multivitamin capsule  Take 1 capsule by mouth once daily.     oxyCODONE-acetaminophen 5-325 mg per tablet  Commonly known as:  PERCOCET  Take 2 tablets by mouth 6 (six) times daily. for 7 days     promethazine 12.5 MG Tab  Commonly known as:  PHENERGAN  Take 1 tablet (12.5 mg total) by mouth 3 (three) times daily. for 10 days     sulfamethoxazole-trimethoprim 400-80mg 400-80 mg per tablet  Commonly known as:  BACTRIM,SEPTRA  Take 2 tablets by mouth 2 (two) times daily. for 14 days          Discharge Procedure Orders   Keep surgical extremity elevated     Ice to affected area     Lifting restrictions     Weight bearing restrictions (specify):     Follow-up Information     Gentry Flores DPM In 1 week.    Specialty:  Podiatry  Contact information:  [address] [facility] [address]  [facility] [place] [address] [phone]

## 2019-05-24 NOTE — ANESTHESIA PREPROCEDURE EVALUATION
05/24/2019  Jerry Islas is a 59 y.o., male.    Anesthesia Evaluation    I have reviewed the Patient Summary Reports.    I have reviewed the Nursing Notes.   I have reviewed the Medications.     Review of Systems  Anesthesia Hx:  No problems with previous Anesthesia    Social:  Non-Smoker    Hematology/Oncology:  Hematology Normal   Oncology Normal     EENT/Dental:EENT/Dental Normal   Cardiovascular:   Hypertension    Pulmonary:  Pulmonary Normal    Renal/:  Renal/ Normal     Hepatic/GI:  Hepatic/GI Normal    Musculoskeletal:   Arthritis     Neurological:  Neurology Normal    Endocrine:  Endocrine Normal    Dermatological:  Skin Normal    Psych:  Psychiatric Normal           Physical Exam  General:  Well nourished    Airway/Jaw/Neck:  Airway Findings: Mouth Opening: Normal Tongue: Normal  General Airway Assessment: Adult  Mallampati: II  TM Distance: Normal, at least 6 cm       Chest/Lungs:  Chest/Lungs Findings: Clear to auscultation     Heart/Vascular:  Heart Findings: Rate: Normal  Rhythm: Regular Rhythm        Mental Status:  Mental Status Findings:  Cooperative, Alert and Oriented         Anesthesia Plan  Type of Anesthesia, risks & benefits discussed:  Anesthesia Type:  general  Patient's Preference:   Intra-op Monitoring Plan: standard ASA monitors  Intra-op Monitoring Plan Comments:   Post Op Pain Control Plan: IV/PO Opioids PRN  Post Op Pain Control Plan Comments:   Induction:   IV  Beta Blocker:  Patient is not currently on a Beta-Blocker (No further documentation required).       Informed Consent: Patient understands risks and agrees with Anesthesia plan.  Questions answered. Anesthesia consent signed with patient.  ASA Score: 2     Day of Surgery Review of History & Physical: I have interviewed and examined the patient. I have reviewed the patient's H&P dated:            Ready For Surgery  From Anesthesia Perspective.

## 2019-05-24 NOTE — OP NOTE
DATE OF PROCEDURE:  05/24/2019    SURGEON:  Gentry Flores DPM    PREOPERATIVE DIAGNOSES:  Plantarflexed metatarsals second and third, left  foot with severe digital contractures digits 2, 3, 4 and 5 left as well as  dislocation of the second digit at the metatarsophalangeal joint, left.   Significant degenerative changes noted about the left foot, diffuse.    PROCEDURE:  1.  An arthrodesis of digits 2, 3, and 4 with an arthroplasty of the fifth  digit of the left.  Procedure code 28285 x4.  2.  Metatarsal osteotomy, second metatarsal with appropriate fixation of  left.  Procedure code 93824.  3.  Metatarsal osteotomy, third metatarsal, left, with appropriate  fixation.  Procedure code 15868.  4.  Condylectomy, head of third metatarsal, left.  Procedure code 41674.    ANESTHESIA:  LMA in conjunction with local infiltrate equaling 30 mL of 1%  lidocaine plain.    HEMOSTASIS:  Ankle tourniquet placed at 250 mmHg for a period of 145  minutes.    ESTIMATED BLOOD LOSS:  Minimal.    MATERIALS USED:  Sterile irrigant 3.0 Vicryl, 4.0 Vicryl, 4.0 nylon,  [place] 28 3.0 headless compression screw 30 mm, [place] 28 2.5 x 20 mm  headless compression screw, [place] 28 hammer tube 3.5 0-degree digital  implant, 2.75 hammer tube 0-degree digital implant and 2.75 0-degree  digital implant.    INJECTABLES:  30 mL of 0.5% Marcaine plain.    PATHOLOGY:  None.    CONDITION:  Stable.    COMPLICATIONS:  None.    PROCEDURE IN DETAILS:  The patient was taken to the Operating Room, placed  in supine position on the OR table.  Attention was then directed towards  the patient's left ankle where Webril cast padding was placed around the  patient's left ankle as well as an ankle tourniquet.  Following this, the  patient was locally anesthetized in a regional block of the patient's left  forefoot utilizing a total of 30 mL of 1% lidocaine plain.  Following this,  the area was then prepped and draped in the usual sterile manner.  The  patient's  foot was exsanguinated utilizing an Esmarch bandage and the ankle  tourniquet was raised to 250 mmHg.  Following this, intraoperative  fluoroscopy was utilized to plan the incision overlying the second and  third ray of the patient's left foot.  A longitudinal linear incision was  created overlying the second digit and second metatarsal of the patient's  left foot.  This was carried down to the level of the extensor tendon and  capsular layer, which was also incised in a longitudinal linear fashion.   The extensor tendon was then transected at the second metatarsophalangeal  joint allowing for visualization of the second metatarsal head, which was  noted to be significantly arthritic in nature.  There was a large bone  callus formation present from previous stress fracture of the second  metatarsal.  There was a lateral deviation of the second metatarsal.  At  this time, a double oblique osteotomy was created in the mid shaft of the  second metatarsal.  This was removed from the surgical field, allowing for  the second metatarsal be shifted in a medially corrected position as  confirmed under intraoperative fluoroscopy.  Once proper alignment had been  achieved and shortening of the second metatarsal to address the plantar  flexion of the second metatarsal.  Screw fixation was utilized with a 3.0 x  30 mm headless compression screw.  This was placed per   guidelines thus maintaining proper alignment and compression at the  osteotomy site.  Attention was then directed overlying the third metatarsal  where a longitudinal linear incision was created, carried down to the level  of the capsular layer, which was also incised in a longitudinal linear  fashion.  The extensor tendon was transected at the level of the third  metatarsophalangeal joint, allowing for evaluation of the third metatarsal.   The third metatarsal head had significant degenerative changes around the  third metatarsal head.  There was a  portion of the condyle that was grossly  arthritic.  This was resected utilizing a sagittal saw.  Additionally, the  third metatarsal was significantly plantarflexed.  A McGlamry elevator was  used to break up the adhesions underlying the third metatarsal, which did  allow for some degree of correction of the plantar flexion deformity;  however, there was still plantar flexion noted and was still prominent on  the plantar aspect of the patient's left forefoot.  Therefore, it was  deemed necessary to perform a dorsiflexion osteotomy of the third  metatarsal.  Sagittal saw was utilized to perform the osteotomy.  Once this  had been performed, the third metatarsal head was then slid proximally and  elevated into a properly aligned position as confirmed under intraoperative  fluoroscopy.  Once this had been achieved, per manufacture guidelines, a  2.5 x 20 mm headless compression screw was placed across the osteotomy  site.  A portion of the guidewire did fracture off within the bone of the  third metatarsal.  This was not able to be retrieved nor removed; however,  it is encapsulated within the third metatarsal bone itself and will not  pose any problem.  Once proper alignment and correction had been achieved,  the forefoot now had no palpable deformity consistent with plantarflexed  metatarsals as previously noted.  Following this, attention was redirected  overlying the dorsal to the third digit.  The incision was extended  overlying the dorsal second digit. The extensor tendon was transected in a  medial to lateral fashion reflected off the head of the proximal phalanx  base of the middle phalanx.  The head of the proximal phalanx was resected  utilizing a sagittal saw as was the articular cartilage on the base of the  middle phalanx.  This allowed for arthrodesis of the proximal  interphalangeal joint in a properly aligned corrected position.  A [place]  28 hammer tube size large 0-degree implant was utilized to  perform the  arthrodesis.  Once this had been performed, the extensor tendon was  repaired via simple interrupted sutures.  Skin closure was achieved by 2-0  nylon in a simple interrupted fashion.  The identical procedure was then  performed on the third and fourth digits; however, a size small hammer tube  0-degree implant was utilized.  The exact same technique procedure and  amount of reduction was achieved through this procedure.  Intraoperative  fluoroscopy was used throughout the procedure to ensure proper amount of  alignment and correction.  The incisions overlying the second and third  metatarsal were also closed via simple interrupted sutures of 3.0 Vicryl,  4.0 Vicryl and 4.0 nylon.  Following this, attention was directed overlying  the fifth digit where a double semielliptical angulated incision was  created.  This allowed for the derotation and surgical correction of the  fifth digit.  The extensor tendon was then transected in a medial to  lateral fashion reflected off the head of the proximal phalanx, which was  resected utilizing a sagittal saw.  An arthroplasty was performed at the  level of this digit.  Once proper alignment had been corrected as confirmed  under intraoperative fluoroscopy, the extensor tendon was trimmed and  repaired via simple interrupted sutures of 4-0 Vicryl.  Then, 4-0 nylon was  used in a simple interrupted fashion to close the skin layers of the  surgical site overlying the fifth digit following the skin plasty in this  region.  After having done this, intraoperative fluoroscopy was used to  confirm all previously noted deformities had been completely corrected.   The patient was then injected with a total of 30 mL of 0.5% Marcaine plain  to create a long-term postoperative anesthetic.  Ankle tourniquet was  lowered and removed.  No significant bleeding noted.  Vascular status  returned to normal preoperative levels.  Adaptic nonadhesive dressing,  sterile 4 x 4s, ABD,  Kerlix were then used to dress the area.  The patient  was placed in a lower leg fiberglass cast.  The patient left the Operating  Room with vital signs stable and vascular status having returned to the  patient's preoperative levels.        KAREN/IN dd: 05/24/2019 13:51:19 (CDT)   td: 05/24/2019 14:29:04 (CDT)  Doc ID #[id number]   Job ID #[id number]    CC:

## 2019-05-24 NOTE — INTERVAL H&P NOTE
The patient has been examined and the H&P has been reviewed:    I concur with the findings and no changes have occurred since H&P was written.    Anesthesia/Surgery risks, benefits and alternative options discussed and understood by patient/family.          Active Hospital Problems    Diagnosis  POA    Hammer toe of left foot [M20.42]  Yes      Resolved Hospital Problems   No resolved problems to display.

## 2019-05-28 ENCOUNTER — TELEPHONE (OUTPATIENT)
Dept: PODIATRY | Facility: CLINIC | Age: 59
End: 2019-05-28

## 2019-05-28 ENCOUNTER — PATIENT MESSAGE (OUTPATIENT)
Dept: PODIATRY | Facility: CLINIC | Age: 59
End: 2019-05-28

## 2019-05-28 NOTE — TELEPHONE ENCOUNTER
Spoke to pt and paperwork was faxed with confirmation it was sent last Thursday 5/23. I faxed paperwork again. Pt verbalized understanding

## 2019-05-28 NOTE — TELEPHONE ENCOUNTER
----- Message from Selma Rosen sent at 5/28/2019 10:50 AM CDT -----  Type: Needs Medical Advice    Who Called:  Self   Symptoms (please be specific):  NA  How long has patient had these symptoms:    Pharmacy name and phone #:  [facility]   Best Call Back Number: [phone]  Additional Information: Patient states the disability office has not received paper work.

## 2019-05-31 ENCOUNTER — TELEPHONE (OUTPATIENT)
Dept: PODIATRY | Facility: CLINIC | Age: 59
End: 2019-05-31

## 2019-05-31 NOTE — TELEPHONE ENCOUNTER
----- Message from Darcy Calloway sent at 5/31/2019  9:59 AM CDT -----  Contact: Dianne/Armaan  Type: Needs Medical Advice    Who Called:  Dianne  Symptoms (please be specific):  jovon  How long has patient had these symptoms:  jovon  Pharm[facility] name and phone #:  [facility]  Best Call Back Number: [phone] claim #[id number]  Additional Information: Dianne states she needs the CPT code for patient's surgery on 5/24. Requests  Notes from 5/13 to present. Please call to advise. thanks!

## 2019-06-03 RX ORDER — DICLOFENAC SODIUM 75 MG/1
TABLET, DELAYED RELEASE ORAL
Qty: 60 TABLET | Refills: 2 | Status: SHIPPED | OUTPATIENT
Start: 2019-06-03 | End: 2019-09-24 | Stop reason: SDUPTHER

## 2019-06-04 ENCOUNTER — OFFICE VISIT (OUTPATIENT)
Dept: PODIATRY | Facility: CLINIC | Age: 59
End: 2019-06-04
Payer: COMMERCIAL

## 2019-06-04 VITALS
TEMPERATURE: 98 F | HEIGHT: 75 IN | WEIGHT: 220 LBS | HEART RATE: 70 BPM | SYSTOLIC BLOOD PRESSURE: 136 MMHG | BODY MASS INDEX: 27.35 KG/M2 | DIASTOLIC BLOOD PRESSURE: 84 MMHG

## 2019-06-04 DIAGNOSIS — M21.6X2 PLANTARFLEXION DEFORMITY OF LEFT FOOT: ICD-10-CM

## 2019-06-04 DIAGNOSIS — M19.079 OSTEOARTHRITIS OF ANKLE AND FOOT, UNSPECIFIED LATERALITY: Primary | ICD-10-CM

## 2019-06-04 DIAGNOSIS — S93.105S DISLOCATED TOE, LEFT, SEQUELA: ICD-10-CM

## 2019-06-04 PROCEDURE — 99999 PR PBB SHADOW E&M-EST. PATIENT-LVL III: CPT | Mod: PBBFAC,,, | Performed by: PODIATRIST

## 2019-06-04 PROCEDURE — 99999 PR PBB SHADOW E&M-EST. PATIENT-LVL III: ICD-10-PCS | Mod: PBBFAC,,, | Performed by: PODIATRIST

## 2019-06-04 PROCEDURE — 99024 PR POST-OP FOLLOW-UP VISIT: ICD-10-PCS | Mod: S$GLB,,, | Performed by: PODIATRIST

## 2019-06-04 PROCEDURE — 99024 POSTOP FOLLOW-UP VISIT: CPT | Mod: S$GLB,,, | Performed by: PODIATRIST

## 2019-06-04 RX ORDER — OXYCODONE AND ACETAMINOPHEN 5; 325 MG/1; MG/1
2 TABLET ORAL EVERY 8 HOURS PRN
COMMUNITY
End: 2019-06-07 | Stop reason: SDUPTHER

## 2019-06-04 NOTE — LETTER
June 7, 2019      CALVIN Donovan, KRISTINA  [address]  [place] [address]           [facility] - [facility]iatry/Wound [facility]  [address] [place] [address]  Phone: [phone]  Fax: [phone]          Patient: Jerry Islas   MR Number: [id number]   YOB: 1960   Date of Visit: 6/4/2019       Dear CALVIN Donovan:    Thank you for referring Jerry Islas to me for evaluation. Attached you will find relevant portions of my assessment and plan of care.    If you have questions, please do not hesitate to call me. I look forward to following Jerry Islas along with you.    Sincerely,    Alethea Funes    Enclosure  CC:  No Recipients    If you would like to receive this communication electronically, please contact [email or web address] or [phone] to request more information on [employer] Link access.    For providers and/or their staff who would like to refer a patient to [facility], please contact us through our one-stop-shop provider referral line, [facility], at [phone].    If you feel you have received this communication in error or would no longer like to receive these types of communications, please e-mail [email or web address]

## 2019-06-07 ENCOUNTER — PATIENT MESSAGE (OUTPATIENT)
Dept: PODIATRY | Facility: CLINIC | Age: 59
End: 2019-06-07

## 2019-06-07 RX ORDER — OXYCODONE AND ACETAMINOPHEN 5; 325 MG/1; MG/1
2 TABLET ORAL 3 TIMES DAILY
Qty: 42 TABLET | Refills: 0 | Status: SHIPPED | OUTPATIENT
Start: 2019-06-07 | End: 2019-06-14

## 2019-06-08 NOTE — PROGRESS NOTES
"Jerry Islas is a 59 y.o. male patient.   1. Osteoarthritis of ankle and foot, unspecified laterality    2. Plantarflexion deformity of left foot    3. Dislocated toe, left, sequela      Past Medical History:   Diagnosis Date    Hypertension      No past surgical history pertinent negatives on file.  Scheduled Meds:  Continuous Infusions:  PRN Meds:    Review of patient's allergies indicates:  No Known Allergies  There are no hospital problems to display for this patient.    Blood pressure 136/84, pulse 70, temperature 98.2 °F (36.8 °C), height 6' 3" (1.905 m), weight 99.8 kg (220 lb).    Subjective  Objective   Assessment & Plan     Patient presents status post metatarsal osteotomy and correction contracted digits all on the left foot patient states he is doing well he is mainly having pain at night or at the end of the day he has been taking his aspirin and his antibiotics he is tolerating well he is maintaining a nonweightbearing status on the left lower extremity.  Patient's cast is somewhat dirty I have advised him he needs to make sure he is keeping it dry and clean there is some dry blood on the distal dressings however capillary fill time is immediate cast is fitting well deal both distally and proximally patient is currently afebrile and in no acute distress.  I do plan to follow up with patient 1 week for a cast change and x-rays he has been advised to contact us with any problems questions or concerns prior to that time.  Gentry Flores, KIRSTIE  6/8/2019  "

## 2019-06-12 ENCOUNTER — OFFICE VISIT (OUTPATIENT)
Dept: PODIATRY | Facility: CLINIC | Age: 59
End: 2019-06-12
Payer: COMMERCIAL

## 2019-06-12 ENCOUNTER — TELEPHONE (OUTPATIENT)
Dept: PODIATRY | Facility: CLINIC | Age: 59
End: 2019-06-12

## 2019-06-12 ENCOUNTER — HOSPITAL ENCOUNTER (OUTPATIENT)
Dept: RADIOLOGY | Facility: HOSPITAL | Age: 59
Discharge: HOME | End: 2019-06-12
Attending: PODIATRIST
Payer: COMMERCIAL

## 2019-06-12 VITALS
RESPIRATION RATE: 18 BRPM | BODY MASS INDEX: 27.35 KG/M2 | DIASTOLIC BLOOD PRESSURE: 66 MMHG | SYSTOLIC BLOOD PRESSURE: 104 MMHG | WEIGHT: 220 LBS | TEMPERATURE: 98 F | HEART RATE: 72 BPM | HEIGHT: 75 IN

## 2019-06-12 DIAGNOSIS — S93.105S DISLOCATED TOE, LEFT, SEQUELA: ICD-10-CM

## 2019-06-12 DIAGNOSIS — M21.6X2 ACQUIRED EQUINUS DEFORMITY OF LEFT FOOT: Primary | ICD-10-CM

## 2019-06-12 DIAGNOSIS — M21.6X2 PLANTARFLEXION DEFORMITY OF LEFT FOOT: ICD-10-CM

## 2019-06-12 DIAGNOSIS — M21.6X2 ACQUIRED EQUINUS DEFORMITY OF LEFT FOOT: ICD-10-CM

## 2019-06-12 DIAGNOSIS — L97.521 SKIN ULCER OF LEFT FOOT, LIMITED TO BREAKDOWN OF SKIN: ICD-10-CM

## 2019-06-12 DIAGNOSIS — M20.42 HAMMER TOE OF LEFT FOOT: ICD-10-CM

## 2019-06-12 PROCEDURE — 73630 XR FOOT COMPLETE 3 VIEW LEFT: ICD-10-PCS | Mod: 26,LT,, | Performed by: RADIOLOGY

## 2019-06-12 PROCEDURE — 73630 X-RAY EXAM OF FOOT: CPT | Mod: 26,LT,, | Performed by: RADIOLOGY

## 2019-06-12 PROCEDURE — 99999 PR PBB SHADOW E&M-EST. PATIENT-LVL IV: ICD-10-PCS | Mod: PBBFAC,,, | Performed by: PODIATRIST

## 2019-06-12 PROCEDURE — 99024 PR POST-OP FOLLOW-UP VISIT: ICD-10-PCS | Mod: S$GLB,,, | Performed by: PODIATRIST

## 2019-06-12 PROCEDURE — 73630 X-RAY EXAM OF FOOT: CPT | Mod: TC,FY,LT

## 2019-06-12 PROCEDURE — 99999 PR PBB SHADOW E&M-EST. PATIENT-LVL IV: CPT | Mod: PBBFAC,,, | Performed by: PODIATRIST

## 2019-06-12 PROCEDURE — 99024 POSTOP FOLLOW-UP VISIT: CPT | Mod: S$GLB,,, | Performed by: PODIATRIST

## 2019-06-12 RX ORDER — SULFAMETHOXAZOLE AND TRIMETHOPRIM 800; 160 MG/1; MG/1
TABLET ORAL
Refills: 0 | COMMUNITY
Start: 2019-05-21 | End: 2019-07-10

## 2019-06-12 NOTE — LETTER
Minal 15, 2019      CALVIN Donovan, KRISTINA  [address]  [place] [address]           [facility] - Podiatry/Wound Care  [address] [facility] [place] [address]  Phone: [phone]  Fax: [phone]          Patient: Jerry Islas   MR Number: [id number]   YOB: 1960   Date of Visit: 6/12/2019       Dear CALVIN Dnoovan:    Thank you for referring Jerry Islas to me for evaluation. Attached you will find relevant portions of my assessment and plan of care.    If you have questions, please do not hesitate to call me. I look forward to following Jerry Islas along with you.    Sincerely,    Gentry Flores, KIRSTIE    Enclosure  CC:  No Recipients    If you would like to receive this communication electronically, please contact [email or web address] or [phone] to request more information on [employer] Link access.    For providers and/or their staff who would like to refer a patient to [facility], please contact us through our one-stop-shop provider referral line, [facility], at [phone].    If you feel you have received this communication in error or would no longer like to receive these types of communications, please e-mail [email or web address]

## 2019-06-12 NOTE — LETTER
June 12, 2019      [facility] - [facility]iatry/[facility]ound Care  [address] [facility] [place] [address]  Phone: [phone]  Fax: [phone]       Patient: Jerry Islas   YOB: 1960  Date of Visit: 06/12/2019    To Whom It May Concern:    Shahnaz Islas  was at [facility] on 06/12/2019. He may not return to work until further notice. If you have any questions or concerns, or if I can be of further assistance, please do not hesitate to contact me.    Sincerely,    Valerie Staley MA

## 2019-06-16 NOTE — PROGRESS NOTES
"Jerry Islas is a 59 y.o. male patient.   1. Acquired equinus deformity of left foot    2. Hammer toe of left foot    3. Plantarflexion deformity of left foot    4. Dislocated toe, left, sequela    5. Skin ulcer of left foot, limited to breakdown of skin      Past Medical History:   Diagnosis Date    Hypertension      No past surgical history pertinent negatives on file.  Scheduled Meds:  Continuous Infusions:  PRN Meds:    Review of patient's allergies indicates:  No Known Allergies  There are no hospital problems to display for this patient.    Blood pressure 104/66, pulse 72, temperature 98.2 °F (36.8 °C), resp. rate 18, height 6' 3" (1.905 m), weight 99.8 kg (220 lb).                                    Subjective  Objective:  Vital signs (most recent): Blood pressure 104/66, pulse 72, temperature 98.2 °F (36.8 °C), resp. rate 18, height 6' 3" (1.905 m), weight 99.8 kg (220 lb).     Assessment & Plan     Patient presents status post metatarsal osteotomy and correction contracted digits all on the left foot patient states he is doing well he is mainly having pain at night or at the end of the day he has been taking his aspirin and his antibiotics he is tolerating well he is maintaining a nonweightbearing status on the left lower extremity.  Patient's cast is somewhat scuffed dirty and broken down a little bit on the plantar surface of remind the patient he is not put any weight at all on the cast. Patient's cast was changed to new lower leg fiberglass cast was applied to the left lower extremity following a light dressing well-padded being placed overlying the incision areas.  Patient was made aware the x-rays look good everything appears to be healing well and in good alignment I again cautioned him ago against putting any weight on his foot it could disrupt the surgical site.  Plan follow-up will be 2 weeks patient is to continue ice elevate as directed I will likely let him go into a fracture boot at his " follow-up.  Patient advised while he may go into a fracture boot on may not let him bear weight just yet will see how he is doing.  Gentry Flores DPM  6/15/2019

## 2019-06-19 ENCOUNTER — TELEPHONE (OUTPATIENT)
Dept: PODIATRY | Facility: CLINIC | Age: 59
End: 2019-06-19

## 2019-06-19 RX ORDER — OXYCODONE AND ACETAMINOPHEN 5; 325 MG/1; MG/1
2 TABLET ORAL 3 TIMES DAILY
Qty: 60 TABLET | Refills: 0 | Status: SHIPPED | OUTPATIENT
Start: 2019-06-19 | End: 2019-06-29

## 2019-06-19 NOTE — TELEPHONE ENCOUNTER
Pt is requesting refill on percocet med last filled on 6/7. Pt is requesting to send electronically to [facility]. Do you want to refill?

## 2019-06-19 NOTE — TELEPHONE ENCOUNTER
----- Message from Lucita Trevino sent at 6/19/2019  1:38 PM CDT -----  Type:  RX Refill Request    Who Called:  patient  Refill or New Rx:  refill  RX Name and Strength:  oxyCODONE-acetaminophen (PERCOCET) 5-325 mg per tablet  How is the patient currently taking it? (ex. 1XDay):  6x day  Is this a 30 day or 90 day RX:  30  Preferred Pharmacy with phone number:      [facility] [place] [address] [place] [address]  Phone: [phone] Fax: [phone]      Local or Mail Order:  local  Ordering Provider: Dr Sandra Baltazar Call Back Number:  [phone]  Additional Information:  Refill request

## 2019-06-19 NOTE — TELEPHONE ENCOUNTER
Advised pt rx could not be sent to pharmacy he could p/u at the clinic pt verbalized understanding

## 2019-06-26 ENCOUNTER — HOSPITAL ENCOUNTER (OUTPATIENT)
Dept: RADIOLOGY | Facility: HOSPITAL | Age: 59
Discharge: HOME | End: 2019-06-26
Attending: PODIATRIST
Payer: COMMERCIAL

## 2019-06-26 ENCOUNTER — OFFICE VISIT (OUTPATIENT)
Dept: PODIATRY | Facility: CLINIC | Age: 59
End: 2019-06-26
Payer: COMMERCIAL

## 2019-06-26 DIAGNOSIS — M20.42 HAMMER TOE OF LEFT FOOT: ICD-10-CM

## 2019-06-26 DIAGNOSIS — M21.6X2 ACQUIRED EQUINUS DEFORMITY OF LEFT FOOT: ICD-10-CM

## 2019-06-26 DIAGNOSIS — M19.079 OSTEOARTHRITIS OF ANKLE AND FOOT, UNSPECIFIED LATERALITY: ICD-10-CM

## 2019-06-26 DIAGNOSIS — M20.42 HAMMER TOE OF LEFT FOOT: Primary | ICD-10-CM

## 2019-06-26 DIAGNOSIS — M21.6X2 PLANTARFLEXION DEFORMITY OF LEFT FOOT: ICD-10-CM

## 2019-06-26 PROCEDURE — 73630 XR FOOT COMPLETE 3 VIEW LEFT: ICD-10-PCS | Mod: 26,LT,, | Performed by: RADIOLOGY

## 2019-06-26 PROCEDURE — 99999 PR PBB SHADOW E&M-EST. PATIENT-LVL II: ICD-10-PCS | Mod: PBBFAC,,, | Performed by: PODIATRIST

## 2019-06-26 PROCEDURE — 73630 X-RAY EXAM OF FOOT: CPT | Mod: 26,LT,, | Performed by: RADIOLOGY

## 2019-06-26 PROCEDURE — 99024 PR POST-OP FOLLOW-UP VISIT: ICD-10-PCS | Mod: S$GLB,,, | Performed by: PODIATRIST

## 2019-06-26 PROCEDURE — 99999 PR PBB SHADOW E&M-EST. PATIENT-LVL II: CPT | Mod: PBBFAC,,, | Performed by: PODIATRIST

## 2019-06-26 PROCEDURE — 99024 POSTOP FOLLOW-UP VISIT: CPT | Mod: S$GLB,,, | Performed by: PODIATRIST

## 2019-06-26 PROCEDURE — 73630 X-RAY EXAM OF FOOT: CPT | Mod: TC,FY,LT

## 2019-06-26 NOTE — LETTER
June 30, 2019      CALVIN Donovan, KRISTINA  [address]  [place] [address]           [facility] - [facility]iatry/Wound Care  [address] [facility] [place] [address]  Phone: [phone]  Fax: [phone]          Patient: Jerry Islas   MR Number: [id number]   YOB: 1960   Date of Visit: 6/26/2019       Dear CALVIN Donovan:    Thank you for referring Jerry Islas to me for evaluation. Attached you will find relevant portions of my assessment and plan of care.    If you have questions, please do not hesitate to call me. I look forward to following Jerry Islas along with you.    Sincerely,    Gentry Flores, KIRSTIE    Enclosure  CC:  No Recipients    If you would like to receive this communication electronically, please contact [email or web address] or [phone] to request more information on [employer] Link access.    For providers and/or their staff who would like to refer a patient to [facility], please contact us through our one-stop-shop provider referral line, [facility], at [phone].    If you feel you have received this communication in error or would no longer like to receive these types of communications, please e-mail [email or web address]

## 2019-06-30 NOTE — PROGRESS NOTES
Jerry Islas is a 59 y.o. male patient.   1. Hammer toe of left foot    2. Acquired equinus deformity of left foot    3. Osteoarthritis of ankle and foot, unspecified laterality    4. Plantarflexion deformity of left foot      Past Medical History:   Diagnosis Date    Hypertension      No past surgical history pertinent negatives on file.  Scheduled Meds:  Continuous Infusions:  PRN Meds:    Review of patient's allergies indicates:  No Known Allergies  There are no hospital problems to display for this patient.    There were no vitals taken for this visit.                                                    Subjective  Objective:  Vital signs (most recent): There were no vitals taken for this visit.     Assessment & Plan     Patient presents status post metatarsal osteotomy and correction contracted digits all on the left foot. Patient relates that he did have an episode where the breaks went out on his knee scooter and he wiped out falling off of the knee scooter patient also has significant signs of weight-bearing on his cast left foot with heavy damage to the cast this was also previously noted but has become progressively worse.  Patient is currently afebrile and in no acute distress.  X-rays reveal proper healing of metatarsal osteotomies patient has lost some degree of surgical correction in digits 3 4 in 5 due to weight-bearing and trauma to these areas patient also has a fractured piece of K-wire within the metatarsal bone this was noted at the time of surgery but was not easily retrieved and it was felt that this would cause more trauma removing this and did not feel that this would cause the patient any problems postoperatively. Patient was made aware that the trauma and weight-bearing to the left foot has compromised the surgical correction he understands this and takes responsibility for this as he relates.  Patient was placed in a fracture boot today I will allow him to bear weight however he cannot  bear weight without the fracture boot he can get these areas wet but is not to apply any cream or lotion or scrub the area sutures were removed today.  Plan follow-up will be 2 weeks the plantar forefoot remains soft and supple where the patient has had multiple ulcerations and plantar flexed metatarsals as these areas continue to heal well it has loss of correction and the digits that were addressed that is noted.  No signs of infection noted patient has finished his antibiotics.           Gentry Flores DPM  6/30/2019

## 2019-07-10 ENCOUNTER — OFFICE VISIT (OUTPATIENT)
Dept: PODIATRY | Facility: CLINIC | Age: 59
End: 2019-07-10
Payer: COMMERCIAL

## 2019-07-10 ENCOUNTER — HOSPITAL ENCOUNTER (OUTPATIENT)
Dept: RADIOLOGY | Facility: HOSPITAL | Age: 59
Discharge: HOME | End: 2019-07-10
Attending: PODIATRIST
Payer: COMMERCIAL

## 2019-07-10 VITALS
SYSTOLIC BLOOD PRESSURE: 136 MMHG | WEIGHT: 218 LBS | TEMPERATURE: 98 F | BODY MASS INDEX: 27.1 KG/M2 | HEIGHT: 75 IN | DIASTOLIC BLOOD PRESSURE: 84 MMHG | HEART RATE: 69 BPM

## 2019-07-10 DIAGNOSIS — M19.079 OSTEOARTHRITIS OF ANKLE AND FOOT, UNSPECIFIED LATERALITY: ICD-10-CM

## 2019-07-10 DIAGNOSIS — M20.42 HAMMER TOE OF LEFT FOOT: ICD-10-CM

## 2019-07-10 DIAGNOSIS — S93.105S DISLOCATED TOE, LEFT, SEQUELA: ICD-10-CM

## 2019-07-10 DIAGNOSIS — M21.6X2 PLANTARFLEXION DEFORMITY OF LEFT FOOT: Primary | ICD-10-CM

## 2019-07-10 DIAGNOSIS — M21.6X2 PLANTARFLEXION DEFORMITY OF LEFT FOOT: ICD-10-CM

## 2019-07-10 PROCEDURE — 99999 PR PBB SHADOW E&M-EST. PATIENT-LVL IV: ICD-10-PCS | Mod: PBBFAC,,, | Performed by: PODIATRIST

## 2019-07-10 PROCEDURE — 73630 X-RAY EXAM OF FOOT: CPT | Mod: TC,FY,LT

## 2019-07-10 PROCEDURE — 73630 XR FOOT COMPLETE 3 VIEW LEFT: ICD-10-PCS | Mod: 26,LT,, | Performed by: RADIOLOGY

## 2019-07-10 PROCEDURE — 99024 PR POST-OP FOLLOW-UP VISIT: ICD-10-PCS | Mod: S$GLB,,, | Performed by: PODIATRIST

## 2019-07-10 PROCEDURE — 99999 PR PBB SHADOW E&M-EST. PATIENT-LVL IV: CPT | Mod: PBBFAC,,, | Performed by: PODIATRIST

## 2019-07-10 PROCEDURE — 73630 X-RAY EXAM OF FOOT: CPT | Mod: 26,LT,, | Performed by: RADIOLOGY

## 2019-07-10 PROCEDURE — 99024 POSTOP FOLLOW-UP VISIT: CPT | Mod: S$GLB,,, | Performed by: PODIATRIST

## 2019-07-10 NOTE — LETTER
July 14, 2019      CALVIN Donovan, KRISTINA  [address]  [place] [address]           [facility]  [address] [facility] [place] [address]  Phone: [phone]  Fax: [phone]          Patient: Jerry Islas   MR Number: [id number]   YOB: 1960   Date of Visit: 7/10/2019       Dear CALVIN Donovan:    Thank you for referring Jerry Islas to me for evaluation. Attached you will find relevant portions of my assessment and plan of care.    If you have questions, please do not hesitate to call me. I look forward to following Jerry Islas along with you.    Sincerely,    Gentry Flores, KIRSTIE    Enclosure  CC:  No Recipients    If you would like to receive this communication electronically, please contact [email or web address] or [phone] to request more information on [email or web address] Link access.    For providers and/or their staff who would like to refer a patient to [facility], please contact us through our one-stop-shop provider referral line, [facility], at [phone].    If you feel you have received this communication in error or would no longer like to receive these types of communications, please e-mail [email or web address]

## 2019-07-11 ENCOUNTER — TELEPHONE (OUTPATIENT)
Dept: PODIATRY | Facility: CLINIC | Age: 59
End: 2019-07-11

## 2019-07-11 RX ORDER — OXYCODONE AND ACETAMINOPHEN 5; 325 MG/1; MG/1
2 TABLET ORAL 2 TIMES DAILY
Qty: 40 TABLET | Refills: 0 | Status: SHIPPED | OUTPATIENT
Start: 2019-07-11 | End: 2019-07-21

## 2019-07-11 NOTE — TELEPHONE ENCOUNTER
+Refill request(s). Please advise. Thank you.     Last refill for oxyCODONE-acetaminophen (PERCOCET) 5-325 mg per tablet was 6/19/19 for 10 days.     ----- Message from Denia Morley sent at 7/11/2019 11:55 AM CDT -----  Contact: Jerry pt  Type:  RX Refill Request    Who Called:  Jerry  Refill or New Rx:  refill  RX Name and Strength:  Percocet / not on pt's chart  How is the patient currently taking it? (ex. 1XDay):  As needed  Is this a 30 day or 90 day RX:  30  Preferred Pharmacy with phone number:    [facility] - [place], [place] - [address] [place], [place] [address] [address] [place] [address]  Phone: [phone] Fax: [phone]      Local or Mail Order:  local  Ordering Provider:  Sandra Baltazar Call Back Number:  [phone]  Additional Information:  Pls call pt to adv when script is ready to be picked  up

## 2019-07-14 NOTE — PROGRESS NOTES
"Jerry Ilsas is a 59 y.o. male patient.   1. Plantarflexion deformity of left foot    2. Dislocated toe, left, sequela    3. Osteoarthritis of ankle and foot, unspecified laterality    4. Hammer toe of left foot      Past Medical History:   Diagnosis Date    Hypertension      No past surgical history pertinent negatives on file.  Scheduled Meds:  Continuous Infusions:  PRN Meds:    Review of patient's allergies indicates:  No Known Allergies  There are no hospital problems to display for this patient.    Blood pressure 136/84, pulse 69, temperature 98.3 °F (36.8 °C), height 6' 3" (1.905 m), weight 98.9 kg (218 lb).                                                            Subjective  Objective:  Vital signs (most recent): Blood pressure 136/84, pulse 69, temperature 98.3 °F (36.8 °C), height 6' 3" (1.905 m), weight 98.9 kg (218 lb).     Assessment & Plan     Patient presents status post metatarsal osteotomy and correction contracted digits all on the left foot. Patient relates that he did have an episode where the breaks went out on his knee scooter and he wiped out falling off of the knee scooter patient also has significant signs of weight-bearing on his cast left foot with heavy damage to the cast this was also previously noted but has become progressively worse.  Patient is currently afebrile and in no acute distress.  X-rays reveal proper healing of metatarsal osteotomies patient has lost some degree of surgical correction in digits 3 4 in 5 due to weight-bearing and trauma to these areas patient also has a fractured piece of K-wire within the metatarsal bone this was noted at the time of surgery but was not easily retrieved and it was felt that this would cause more trauma removing this and did not feel that this would cause the patient any problems postoperatively. Patient was made aware that the trauma and weight-bearing to the left foot has compromised the surgical correction he understands this and " takes responsibility for this as he relates.  Patient is going to continue to wear the boot for 2 more weeks as the area continues to heal he can certainly continue to get the area wet started the patient on diclofenac to help with inflammation he states the swelling is kind of been on and off depending upon how much she has been on his feet at this point I do not feel the patient is going to be ready to go back to work for at least 4 more weeks the previously noted deformity of plantar flexion of the metatarsals remains completely resolved and the forefoot is soft and supple there appears to be good healing at all surgical sites there is some question of separation of the osteotomy of the 2nd metatarsal will likely related to the patient's trauma to the area however this is only on 1° angle of the x-ray views this will be monitored closely.  Follow-up 2 weeks.  X-rays reviewed.  Patient dispensed prescription for pain medication to take only as needed.           Gentry Flores DPM  7/14/2019

## 2019-07-15 ENCOUNTER — TELEPHONE (OUTPATIENT)
Dept: PODIATRY | Facility: CLINIC | Age: 59
End: 2019-07-15

## 2019-07-15 NOTE — TELEPHONE ENCOUNTER
----- Message from Cash Montoya sent at 7/15/2019  2:22 PM CDT -----  Contact: Dianne with [employer]   Type: Needs Medical Advice    Who Called:  Dianne with [employer]   Best Call Back Number: [phone] Fax: [phone]  Additional Information: Requesting clinical notes for patient and estimate on when he will be able to return to work. Please advise   Any fax sent must have claim number listed on paperwork   Claim#: [id number]

## 2019-07-24 ENCOUNTER — OFFICE VISIT (OUTPATIENT)
Dept: PODIATRY | Facility: CLINIC | Age: 59
End: 2019-07-24
Payer: COMMERCIAL

## 2019-07-24 ENCOUNTER — HOSPITAL ENCOUNTER (OUTPATIENT)
Dept: RADIOLOGY | Facility: HOSPITAL | Age: 59
Discharge: HOME | End: 2019-07-24
Attending: PODIATRIST
Payer: COMMERCIAL

## 2019-07-24 VITALS
HEART RATE: 75 BPM | TEMPERATURE: 98 F | SYSTOLIC BLOOD PRESSURE: 149 MMHG | DIASTOLIC BLOOD PRESSURE: 86 MMHG | WEIGHT: 218 LBS | BODY MASS INDEX: 27.1 KG/M2 | HEIGHT: 75 IN

## 2019-07-24 DIAGNOSIS — M21.6X2 PLANTARFLEXION DEFORMITY OF LEFT FOOT: Primary | ICD-10-CM

## 2019-07-24 DIAGNOSIS — M19.079 OSTEOARTHRITIS OF ANKLE AND FOOT, UNSPECIFIED LATERALITY: ICD-10-CM

## 2019-07-24 DIAGNOSIS — M21.6X2 PLANTARFLEXION DEFORMITY OF LEFT FOOT: ICD-10-CM

## 2019-07-24 DIAGNOSIS — M20.42 HAMMER TOE OF LEFT FOOT: ICD-10-CM

## 2019-07-24 PROCEDURE — 99999 PR PBB SHADOW E&M-EST. PATIENT-LVL III: ICD-10-PCS | Mod: PBBFAC,,, | Performed by: PODIATRIST

## 2019-07-24 PROCEDURE — 99999 PR PBB SHADOW E&M-EST. PATIENT-LVL III: CPT | Mod: PBBFAC,,, | Performed by: PODIATRIST

## 2019-07-24 PROCEDURE — 73630 X-RAY EXAM OF FOOT: CPT | Mod: TC,FY,LT

## 2019-07-24 PROCEDURE — 73630 X-RAY EXAM OF FOOT: CPT | Mod: 26,LT,, | Performed by: RADIOLOGY

## 2019-07-24 PROCEDURE — 99024 PR POST-OP FOLLOW-UP VISIT: ICD-10-PCS | Mod: S$GLB,,, | Performed by: PODIATRIST

## 2019-07-24 PROCEDURE — 73630 XR FOOT COMPLETE 3 VIEW LEFT: ICD-10-PCS | Mod: 26,LT,, | Performed by: RADIOLOGY

## 2019-07-24 PROCEDURE — 99024 POSTOP FOLLOW-UP VISIT: CPT | Mod: S$GLB,,, | Performed by: PODIATRIST

## 2019-07-24 NOTE — LETTER
July 28, 2019      CALVIN Donovan, KRISTINA  [address]  [place] [address]           [facility]ound [facility]  [address] [facility] [place] [address]  Phone: [phone]  Fax: [phone]          Patient: Jerry Islas   MR Number: [id number]   YOB: 1960   Date of Visit: 7/24/2019       Dear CALVIN Donovan:    Thank you for referring Jerry Islas to me for evaluation. Attached you will find relevant portions of my assessment and plan of care.    If you have questions, please do not hesitate to call me. I look forward to following Jerry Islas along with you.    Sincerely,    Gentry Flores, KIRSTIE    Enclosure  CC:  No Recipients    If you would like to receive this communication electronically, please contact [email or web address] or [phone] to request more information on [employer] Link access.    For providers and/or their staff who would like to refer a patient to [facility], please contact us through our one-stop-shop provider referral line, [facility], at [phone].    If you feel you have received this communication in error or would no longer like to receive these types of communications, please e-mail [email or web address]

## 2019-07-24 NOTE — LETTER
July 24, 2019      [facility]  [address] [facility] [place] [address]  Phone: [phone]  Fax: [phone]       Patient: Jerry Islas   YOB: 1960  Date of Visit: 07/24/2019    To Whom It May Concern:    Shahnaz Islas  was at [facility] on 07/24/2019. He may not return to work/school until further notice. If you have any questions or concerns, or if I can be of further assistance, please do not hesitate to contact me.    Sincerely,    Valerie Staley MA

## 2019-07-29 NOTE — PROGRESS NOTES
"Jerry Islas is a 59 y.o. male patient.   1. Plantarflexion deformity of left foot    2. Osteoarthritis of ankle and foot, unspecified laterality    3. Hammer toe of left foot      Past Medical History:   Diagnosis Date    Hypertension      No past surgical history pertinent negatives on file.  Scheduled Meds:  Continuous Infusions:  PRN Meds:    Review of patient's allergies indicates:  No Known Allergies  There are no hospital problems to display for this patient.    Blood pressure (!) 149/86, pulse 75, temperature 98 °F (36.7 °C), height 6' 3" (1.905 m), weight 98.9 kg (218 lb).                                                                    Subjective  Objective:  Vital signs (most recent): Blood pressure (!) 149/86, pulse 75, temperature 98 °F (36.7 °C), height 6' 3" (1.905 m), weight 98.9 kg (218 lb).     Assessment & Plan     Patient presents status post metatarsal osteotomy and correction contracted digits all on the left foot.  X-rays reveal proper healing of metatarsal osteotomies patient has lost some degree of surgical correction in digits 3 4 in 5 due to weight-bearing and trauma to these areas patient also has a fractured piece of K-wire within the metatarsal bone this was noted at the time of surgery but was not easily retrieved and it was felt that this would cause more trauma removing this and did not feel that this would cause the patient any problems postoperatively. Patient was made aware that the trauma and weight-bearing to the left foot has compromised the surgical correction he understands this and takes responsibility for this as he relates.  Patient is going to continue to wear the boot for 2 more weeks as the area continues to heal he can certainly continue to get the area wet started the patient on diclofenac to help with inflammation he states the swelling is kind of been on and off depending upon how much she has been on his feet at this point I do not feel the patient is going " to be ready to go back to work for at least 2 more weeks the previously noted deformity of plantar flexion of the metatarsals remains completely resolved and the forefoot is soft and supple there appears to be good healing at all surgical sites.  Patient is currently not having any pain or discomfort he still got some swelling but he states he is completely pain-free at this time I have advised the patient I want him to start to transition to a normal shoe as he tolerates depending upon the swelling I did dispense the patient a compressive stocking to help with the swelling is going to continue taking the diclofenac.  Follow-up 2 weeks.  X-rays reviewed.            Gentry Flores DPM  7/28/2019

## 2019-08-07 ENCOUNTER — HOSPITAL ENCOUNTER (OUTPATIENT)
Dept: RADIOLOGY | Facility: HOSPITAL | Age: 59
Discharge: HOME | End: 2019-08-07
Attending: PODIATRIST
Payer: COMMERCIAL

## 2019-08-07 ENCOUNTER — OFFICE VISIT (OUTPATIENT)
Dept: PODIATRY | Facility: CLINIC | Age: 59
End: 2019-08-07
Payer: COMMERCIAL

## 2019-08-07 VITALS
HEIGHT: 75 IN | BODY MASS INDEX: 27.6 KG/M2 | WEIGHT: 222 LBS | HEART RATE: 71 BPM | TEMPERATURE: 98 F | SYSTOLIC BLOOD PRESSURE: 130 MMHG | DIASTOLIC BLOOD PRESSURE: 80 MMHG

## 2019-08-07 DIAGNOSIS — M20.42 HAMMER TOE OF LEFT FOOT: ICD-10-CM

## 2019-08-07 DIAGNOSIS — M21.6X2 ACQUIRED EQUINUS DEFORMITY OF LEFT FOOT: ICD-10-CM

## 2019-08-07 DIAGNOSIS — M19.079 OSTEOARTHRITIS OF ANKLE AND FOOT, UNSPECIFIED LATERALITY: ICD-10-CM

## 2019-08-07 DIAGNOSIS — M21.6X2 PLANTARFLEXION DEFORMITY OF LEFT FOOT: ICD-10-CM

## 2019-08-07 DIAGNOSIS — M21.6X2 PLANTARFLEXION DEFORMITY OF LEFT FOOT: Primary | ICD-10-CM

## 2019-08-07 PROCEDURE — 73630 X-RAY EXAM OF FOOT: CPT | Mod: 26,LT,, | Performed by: RADIOLOGY

## 2019-08-07 PROCEDURE — 99999 PR PBB SHADOW E&M-EST. PATIENT-LVL III: CPT | Mod: PBBFAC,,, | Performed by: PODIATRIST

## 2019-08-07 PROCEDURE — 73630 X-RAY EXAM OF FOOT: CPT | Mod: TC,FY,LT

## 2019-08-07 PROCEDURE — 99024 POSTOP FOLLOW-UP VISIT: CPT | Mod: S$GLB,,, | Performed by: PODIATRIST

## 2019-08-07 PROCEDURE — 99024 PR POST-OP FOLLOW-UP VISIT: ICD-10-PCS | Mod: S$GLB,,, | Performed by: PODIATRIST

## 2019-08-07 PROCEDURE — 99999 PR PBB SHADOW E&M-EST. PATIENT-LVL III: ICD-10-PCS | Mod: PBBFAC,,, | Performed by: PODIATRIST

## 2019-08-07 PROCEDURE — 73630 XR FOOT COMPLETE 3 VIEW LEFT: ICD-10-PCS | Mod: 26,LT,, | Performed by: RADIOLOGY

## 2019-08-07 NOTE — LETTER
August 10, 2019      CALVIN Donovan, KRISTINA  [address]  [place] [address]           [facility]ound [facility]  [address] [facility] [place] [address]  Phone: [phone]  Fax: [phone]          Patient: Jerry Islas   MR Number: [id number]   YOB: 1960   Date of Visit: 8/7/2019       Dear CALVIN Donovan:    Thank you for referring Jerry Islas to me for evaluation. Attached you will find relevant portions of my assessment and plan of care.    If you have questions, please do not hesitate to call me. I look forward to following Jerry Islas along with you.    Sincerely,    Gentry Flores, KIRSTIE    Enclosure  CC:  No Recipients    If you would like to receive this communication electronically, please contact [email or web address] or [phone] to request more information on [employer] Link access.    For providers and/or their staff who would like to refer a patient to [facility], please contact us through our one-stop-shop provider referral line, [facility], at [phone].    If you feel you have received this communication in error or would no longer like to receive these types of communications, please e-mail [email or web address]

## 2019-08-07 NOTE — LETTER
August 7, 2019      [facility]  [address] [facility] [place] [address]  Phone: [phone]  Fax: [phone]       Patient: Jerry Islas   YOB: 1960  Date of Visit: 08/07/2019    To Whom It May Concern:    Shahnaz Islas  was at [facility] on 08/07/2019. He may return to work/school on 08/12/2019 with no restrictions. If you have any questions or concerns, or if I can be of further assistance, please do not hesitate to contact me.    Sincerely,    Valerie Staley MA

## 2019-08-08 ENCOUNTER — TELEPHONE (OUTPATIENT)
Dept: PODIATRY | Facility: CLINIC | Age: 59
End: 2019-08-08

## 2019-08-08 NOTE — TELEPHONE ENCOUNTER
Faxed     ----- Message from Maria Eugenia Mahan sent at 8/8/2019  3:17 PM CDT -----  Contact: Dianne mcknight/ patricia ph# [phone]  Dianne mcknight/ patricia ph# [phone] requesting copy of office notes from 8/7/19  Claim#[id number]  Fax#[phone]

## 2019-08-11 NOTE — PROGRESS NOTES
"Jerry Islas is a 59 y.o. male patient.   1. Plantarflexion deformity of left foot    2. Acquired equinus deformity of left foot    3. Osteoarthritis of ankle and foot, unspecified laterality    4. Hammer toe of left foot      Past Medical History:   Diagnosis Date    Hypertension      No past surgical history pertinent negatives on file.  Scheduled Meds:  Continuous Infusions:  PRN Meds:    Review of patient's allergies indicates:  No Known Allergies  There are no hospital problems to display for this patient.    Blood pressure 130/80, pulse 71, temperature 98 °F (36.7 °C), height 6' 3" (1.905 m), weight 100.7 kg (222 lb).                    Subjective  Objective:  Vital signs (most recent): Blood pressure 130/80, pulse 71, temperature 98 °F (36.7 °C), height 6' 3" (1.905 m), weight 100.7 kg (222 lb).     Assessment & Plan     Patient presents status post metatarsal osteotomy and correction contracted digits all on the left foot.  X-rays reveal proper healing of metatarsal osteotomies patient has lost some degree of surgical correction in digits 3 4 in 5 due to weight-bearing and trauma to these areas patient also has a fractured piece of K-wire within the metatarsal bone this was noted at the time of surgery but was not easily retrieved and it was felt that this would cause more trauma removing this and did not feel that this would cause the patient any problems postoperatively. Patient was made aware that the trauma and weight-bearing to the left foot has compromised the surgical correction he understands this and takes responsibility for this as he relates.  Additionally there appears to be some separation of the osteotomy of the 2nd metatarsal this is present primarily on the AP view the oblique views it appears more consistent with healing osteotomy site however any degree of separation or nonhealing in this area is likely related to the patient's weight-bearing on his cast when he was supposed to maintain " a complete nonweightbearing status.  Patient states he is doing very well he is not having any pain or discomfort I am going to have the patient continue taking the diclofenac to manage his inflammation I have recommended ice and elevation when needed he is very happy with the surgical outcome and states his pain is significantly relieved I am going to release the patient following the surgical intervention and allow him to go back to work without any restrictions.This note was created using [employer] voice recognition software that occasionally misinterpreted phrases or words.           Gentry Flores DPM  8/10/2019

## 2019-09-24 RX ORDER — DICLOFENAC SODIUM 75 MG/1
TABLET, DELAYED RELEASE ORAL
Qty: 60 TABLET | Refills: 2 | Status: SHIPPED | OUTPATIENT
Start: 2019-09-24 | End: 2020-01-22

## 2019-10-22 ENCOUNTER — TELEPHONE (OUTPATIENT)
Dept: PODIATRY | Facility: CLINIC | Age: 59
End: 2019-10-22

## 2019-10-22 NOTE — TELEPHONE ENCOUNTER
2 refills were on rx sent on 9/24 called pharmacy and they stated they now see the refills. Advised pt's wife med will be ready for p/u shortly. She verbalized understanding

## 2019-10-22 NOTE — TELEPHONE ENCOUNTER
----- Message from Aliya Merlos sent at 10/22/2019 11:12 AM CDT -----  Contact: Patient's wife Maite  Type:  RX Refill Request    Who Called:  Maite  Refill or New Rx:  refill  RX Name and Strength:  diclofenac (VOLTAREN) 75 MG EC tablet  How is the patient currently taking it? (ex. 1XDay):  2xday  Is this a 30 day or 90 day RX:  30 day  Preferred Pharmacy with phone number:   [facility] [address] [place] [address]  Phone: [phone] Fax: [phone]    Local or Mail Order: Local  Ordering Provider:  Sandra Baltazar Call Back Number: [phone]

## 2019-11-08 NOTE — TELEPHONE ENCOUNTER
Advised pt rx was ready for p/u  
Pt is requesting refill on percocet to be sent [facility]. He forgot to ask for refill at last appt. Please advise.  
[Nl] : Integumentary

## 2020-01-22 RX ORDER — DICLOFENAC SODIUM 75 MG/1
TABLET, DELAYED RELEASE ORAL
Qty: 60 TABLET | Refills: 2 | Status: SHIPPED | OUTPATIENT
Start: 2020-01-22 | End: 2020-03-18 | Stop reason: ALTCHOICE

## 2020-03-18 ENCOUNTER — HOSPITAL ENCOUNTER (OUTPATIENT)
Dept: RADIOLOGY | Facility: HOSPITAL | Age: 60
Discharge: HOME | End: 2020-03-18
Attending: PODIATRIST
Payer: COMMERCIAL

## 2020-03-18 ENCOUNTER — OFFICE VISIT (OUTPATIENT)
Dept: PODIATRY | Facility: CLINIC | Age: 60
End: 2020-03-18
Payer: COMMERCIAL

## 2020-03-18 VITALS
DIASTOLIC BLOOD PRESSURE: 86 MMHG | BODY MASS INDEX: 27.35 KG/M2 | TEMPERATURE: 99 F | SYSTOLIC BLOOD PRESSURE: 144 MMHG | HEIGHT: 75 IN | WEIGHT: 220 LBS | HEART RATE: 79 BPM

## 2020-03-18 DIAGNOSIS — M20.41 HAMMER TOES OF BOTH FEET: ICD-10-CM

## 2020-03-18 DIAGNOSIS — M20.42 HAMMER TOES OF BOTH FEET: ICD-10-CM

## 2020-03-18 DIAGNOSIS — M19.071 OSTEOARTHRITIS OF RIGHT ANKLE AND FOOT: ICD-10-CM

## 2020-03-18 DIAGNOSIS — M19.071 OSTEOARTHRITIS OF RIGHT ANKLE AND FOOT: Primary | ICD-10-CM

## 2020-03-18 DIAGNOSIS — M14.671 CHARCOT ANKLE, RIGHT: ICD-10-CM

## 2020-03-18 PROCEDURE — 3008F PR BODY MASS INDEX (BMI) DOCUMENTED: ICD-10-PCS | Mod: S$GLB,,, | Performed by: PODIATRIST

## 2020-03-18 PROCEDURE — 73610 X-RAY EXAM OF ANKLE: CPT | Mod: 26,RT,, | Performed by: RADIOLOGY

## 2020-03-18 PROCEDURE — 73610 X-RAY EXAM OF ANKLE: CPT | Mod: TC,FY,RT

## 2020-03-18 PROCEDURE — 73630 X-RAY EXAM OF FOOT: CPT | Mod: 26,RT,, | Performed by: RADIOLOGY

## 2020-03-18 PROCEDURE — 73610 XR ANKLE COMPLETE 3 VIEW RIGHT: ICD-10-PCS | Mod: 26,RT,, | Performed by: RADIOLOGY

## 2020-03-18 PROCEDURE — 73630 XR FOOT COMPLETE 3 VIEW RIGHT: ICD-10-PCS | Mod: 26,RT,, | Performed by: RADIOLOGY

## 2020-03-18 PROCEDURE — 3008F BODY MASS INDEX DOCD: CPT | Mod: S$GLB,,, | Performed by: PODIATRIST

## 2020-03-18 PROCEDURE — 99214 PR OFFICE/OUTPT VISIT, EST, LEVL IV, 30-39 MIN: ICD-10-PCS | Mod: S$GLB,,, | Performed by: PODIATRIST

## 2020-03-18 PROCEDURE — 73630 X-RAY EXAM OF FOOT: CPT | Mod: TC,FY,RT

## 2020-03-18 PROCEDURE — 99999 PR PBB SHADOW E&M-EST. PATIENT-LVL IV: CPT | Mod: PBBFAC,,, | Performed by: PODIATRIST

## 2020-03-18 PROCEDURE — 99999 PR PBB SHADOW E&M-EST. PATIENT-LVL IV: ICD-10-PCS | Mod: PBBFAC,,, | Performed by: PODIATRIST

## 2020-03-18 PROCEDURE — 99214 OFFICE O/P EST MOD 30 MIN: CPT | Mod: S$GLB,,, | Performed by: PODIATRIST

## 2020-03-18 RX ORDER — ZOLPIDEM TARTRATE 10 MG/1
TABLET ORAL
COMMUNITY
Start: 2020-02-28 | End: 2023-01-25

## 2020-03-18 RX ORDER — CELECOXIB 200 MG/1
200 CAPSULE ORAL 2 TIMES DAILY
Qty: 60 CAPSULE | Refills: 4 | Status: SHIPPED | OUTPATIENT
Start: 2020-03-18 | End: 2020-04-17

## 2020-03-18 NOTE — LETTER
March 22, 2020      CALVIN Donovan, KRISTINA  [address] [facility] [place] [address]           [facility]ound [facility]  [address] [facility] [place] [address]  Phone: [phone]  Fax: [phone]          Patient: Jerry Islas   MR Number: [id number]   YOB: 1960   Date of Visit: 3/18/2020       Dear CALVIN Donovan:    Thank you for referring Jerry Islas to me for evaluation. Attached you will find relevant portions of my assessment and plan of care.    If you have questions, please do not hesitate to call me. I look forward to following Jerry Islas along with you.    Sincerely,    Gentry Flores, KIRSTIE    Enclosure  CC:  No Recipients    If you would like to receive this communication electronically, please contact [email or web address] or [phone] to request more information on [employer] Link access.    For providers and/or their staff who would like to refer a patient to [facility], please contact us through our one-stop-shop provider referral line, [facility], at [phone].    If you feel you have received this communication in error or would no longer like to receive these types of communications, please e-mail [email or web address]

## 2020-03-22 PROBLEM — M14.671 CHARCOT ANKLE, RIGHT: Status: ACTIVE | Noted: 2020-03-22

## 2020-03-22 NOTE — PROGRESS NOTES
Subjective:       Patient ID: Jerry Islas is a 59 y.o. male.    Chief Complaint: Follow-up; Foot Pain; Foot Problem; Callouses; and Foot Ulcer    Foot Pain   Associated symptoms include arthralgias and joint swelling.   Foot Ulcer   Associated symptoms include arthralgias and joint swelling.   Follow-up   Associated symptoms include arthralgias and joint swelling.      Review of Systems   Musculoskeletal: Positive for arthralgias, gait problem and joint swelling.   All other systems reviewed and are negative.      Objective:      Physical Exam   Constitutional: He appears well-developed and well-nourished.   Cardiovascular: Normal rate, regular rhythm and normal heart sounds.   Pulses:       Dorsalis pedis pulses are 2+ on the right side, and 2+ on the left side.        Posterior tibial pulses are 1+ on the right side, and 1+ on the left side.   Pulmonary/Chest: Effort normal and breath sounds normal.   Musculoskeletal: He exhibits edema, tenderness and deformity.        Right ankle: He exhibits decreased range of motion and deformity.        Feet:    Feet:   Right Foot:   Protective Sensation: 4 sites tested. 4 sites sensed.   Left Foot:   Protective Sensation: 4 sites tested. 4 sites sensed.   Skin Integrity: Positive for ulcer, skin breakdown, erythema, warmth, callus and dry skin.   Neurological: He is alert.   Skin: Skin is warm. Capillary refill takes less than 2 seconds.   Psychiatric: He has a normal mood and affect. His behavior is normal. Judgment and thought content normal.   Nursing note and vitals reviewed.                                                  Assessment:       1. Osteoarthritis of right ankle and foot    2. Hammer toes of both feet    3. Charcot ankle, right        Plan:        Patient presents today he relates that his right foot is turning out he states that he 1st noticed this about 2 months ago it has gotten progressively worse he also injured his right great toe he states this is  doing better he has additionally having discomfort overlying the 5th digit on the left.  Patient states he is very pleased with the surgery performed on the left foot.  On evaluation patient has ecchymosis encompassing the patient's right hallux he is obviously injured this area there is a callus on the patient's right hallux with blood underlying the area this displays no active signs of infection patient states this is actually doing a lot better where he had injured his right great toe x-rays of the area display no significant signs of fracture or dislocation.  Patient's 5th digit left is contracted is causing rubbing and irritation and shoe there is no signs of infection or ulceration however it is at risk this needs to be monitored carefully patient needs to make sure he is wearing shoes that do not rub or irritate this area.  Patient has significant collapse of the midfoot putting stress on the ankle right this has gotten progressively worse and has caused the patient considerable discomfort I have advised the patient we need to address this immediately with increased support to prevent the breakdown and rolling of his foot we dispensed the patient a compressive stocking he is already wearing arch support I have added a little additional support I have reviewed the x-rays with the patient of the right foot and ankle there are no fractures no dislocation noted patient does have diffuse degenerative changes which is expected considering the patient's previous surgical intervention on the left foot.  Patient was advised we need to get the right foot and ankle settle down he is obviously an inflammatory stage I did discuss Charcot arthropathy with the patient this may be related to the patient's neuropathy that he currently has although he is not a diabetic.  I am going to change the patient to Celebrex 200 mg twice a day he has been taking diclofenac for an extended period of time he does not feel it is working  as well as it had been he is going to start the Celebrex use the additional support as well as the ankle stocking to limit inflammation and swelling in the right lower extremity I will plan to see him for follow-up in 2-3 weeks for further evaluation and treatment as needed.  Patient was in understanding and agreement with this.  I stressed the patient the need for him to wear the appropriate support at all times.  Patient is to monitor these areas of skin breakdown while they are not currently ulcerated they are at risk for ulceration bilateral.  Total face-to-face time equaled 25 min.This note was created using [employer] voice recognition software that occasionally misinterpreted phrases or words.

## 2022-10-31 ENCOUNTER — TELEPHONE (OUTPATIENT)
Dept: PODIATRY | Facility: CLINIC | Age: 62
End: 2022-10-31
Payer: COMMERCIAL

## 2022-11-03 NOTE — PROGRESS NOTES
Subjective:      Patient ID: Jerry Islas is a 58 y.o. male.    Chief Complaint: Follow-up    Patient presents for follow-up of an ulceration with infected wound on the plantar surface of the patient's left foot.  Patient states the area still very painful although it has gotten somewhat better.  Patient has a history of previous digital infection 2nd digit left.      ROS     Constitutional    Pleasant, well-nourished, no distress, well oriented    Cardiovascular          No chest pain, no shortness of breath    Respiratory           No cough, no congestion     Musculoskeletal        No muscle aches, no arthralgias/joint pain, no back pain, no swelling in the extremities    Neurologic         No weakness, no numbness    Psychiatric   No depression, no anxiety            Objective:      Physical Exam  Vascular         Arterial Pulses Right: posterior tibialis 2/4, dorsalis pedis 2/4, normal CFT   Arterial Pulses Left: posterior tibialis 2/4, dorsalis pedis 2/4, normal CFT   No lower extremity edema bilateral   Pedal skin temperature and color are normal bilateral     Integumentary   Patient has a discolored, hyperkeratotic lesion sub 3rd MPJ left foot with obvious underlying fluid.       Upon debridement there is an ulcer  towards 4th web space.      Small area sub 3rd MPJ fat layer exposed, no tracking through this area.        Positive edema, erythema and calor.        This is partly due to compensating for hallux rigidus/area of previous foot surgery    Neurological   Gross sensation intact, paresthesias left foot, takes Neurontin    Musculoskeletal   Muscle Strength/Testing and Tone:  Intact, normal tone bilateral   Joints, Bones, and Muscles:  Limited range of motion 1st MPJ, arthritic degenerative changes left foot        Walks well an assisted          Assessment:       Encounter Diagnoses   Name Primary?    Skin ulcer of left foot, limited to breakdown of skin Yes    Hammer toe of left foot      Cellulitis of left foot          Plan:       Jerry was seen today for follow-up.    Diagnoses and all orders for this visit:    Skin ulcer of left foot, limited to breakdown of skin  -     Cancel: X-Ray Ankle Complete Left; Future  -     X-Ray Foot Complete Left; Future    Hammer toe of left foot  -     Cancel: X-Ray Ankle Complete Left; Future  -     X-Ray Foot Complete Left; Future    Cellulitis of left foot  -     Cancel: X-Ray Ankle Complete Left; Future  -     X-Ray Foot Complete Left; Future        Following evaluation the patient's x-rays were reviewed and discussed in detail with the patient patient has severe digital contractures plantar flexed metatarsals and significant degenerative changes noted all in the left foot the wound itself looks lot better it is stable at this time the infection is completely resolved non excisional debridement was performed removing hyperkeratotic tissue overlying the area patient has finished taking his Bactrim as directed patient does have some irritation overlying the 5th digit left patient needs to monitor this closely there is no signs of ulceration or breakdown but it is definitely irritated and at risk for breakdown.  I did dispense the patient a still a post toe sleeve to use over the 5th digit to prevent irritation at some point the patient is going to need additional surgical intervention to address these deformities of the left foot currently it is stable it has improved the ulcer is healed patient advised he needs to make sure his the appropriate support at all times of weight-bearing I placed an offloading pad in the metatarsal region of the left to try to prevent areas of additional breakdown in the future.  Patient was in agreement with this follow-up will be as needed.    This note was created using [employer] voice recognition software that occasionally misinterpreted phrases or words.           [phone]

## 2022-11-07 ENCOUNTER — OFFICE VISIT (OUTPATIENT)
Dept: PODIATRY | Facility: CLINIC | Age: 62
End: 2022-11-07
Payer: COMMERCIAL

## 2022-11-07 ENCOUNTER — HOSPITAL ENCOUNTER (OUTPATIENT)
Dept: RADIOLOGY | Facility: HOSPITAL | Age: 62
Discharge: HOME | End: 2022-11-07
Attending: PODIATRIST
Payer: COMMERCIAL

## 2022-11-07 VITALS
HEIGHT: 75 IN | DIASTOLIC BLOOD PRESSURE: 68 MMHG | SYSTOLIC BLOOD PRESSURE: 104 MMHG | BODY MASS INDEX: 29.59 KG/M2 | TEMPERATURE: 98 F | HEART RATE: 76 BPM | WEIGHT: 238 LBS

## 2022-11-07 DIAGNOSIS — M19.071 OSTEOARTHRITIS OF RIGHT ANKLE AND FOOT: ICD-10-CM

## 2022-11-07 DIAGNOSIS — E11.9 COMPREHENSIVE DIABETIC FOOT EXAMINATION, TYPE 2 DM, ENCOUNTER FOR: ICD-10-CM

## 2022-11-07 DIAGNOSIS — M20.41 HAMMER TOE OF RIGHT FOOT: ICD-10-CM

## 2022-11-07 DIAGNOSIS — M14.671 CHARCOT ANKLE, RIGHT: ICD-10-CM

## 2022-11-07 DIAGNOSIS — L97.512 ULCER OF RIGHT FOOT WITH FAT LAYER EXPOSED: Primary | ICD-10-CM

## 2022-11-07 DIAGNOSIS — E11.9 TYPE 2 DIABETES MELLITUS WITHOUT COMPLICATION, WITHOUT LONG-TERM CURRENT USE OF INSULIN: ICD-10-CM

## 2022-11-07 PROCEDURE — 99215 PR OFFICE/OUTPT VISIT, EST, LEVL V, 40-54 MIN: ICD-10-PCS | Mod: S$GLB,,, | Performed by: PODIATRIST

## 2022-11-07 PROCEDURE — 3008F PR BODY MASS INDEX (BMI) DOCUMENTED: ICD-10-PCS | Mod: CPTII,S$GLB,, | Performed by: PODIATRIST

## 2022-11-07 PROCEDURE — 4010F ACE/ARB THERAPY RXD/TAKEN: CPT | Mod: CPTII,S$GLB,, | Performed by: PODIATRIST

## 2022-11-07 PROCEDURE — 4010F PR ACE/ARB THEARPY RXD/TAKEN: ICD-10-PCS | Mod: CPTII,S$GLB,, | Performed by: PODIATRIST

## 2022-11-07 PROCEDURE — 87077 CULTURE AEROBIC IDENTIFY: CPT | Performed by: PODIATRIST

## 2022-11-07 PROCEDURE — 1159F MED LIST DOCD IN RCRD: CPT | Mod: CPTII,S$GLB,, | Performed by: PODIATRIST

## 2022-11-07 PROCEDURE — 73630 X-RAY EXAM OF FOOT: CPT | Mod: 26,RT,, | Performed by: RADIOLOGY

## 2022-11-07 PROCEDURE — 3008F BODY MASS INDEX DOCD: CPT | Mod: CPTII,S$GLB,, | Performed by: PODIATRIST

## 2022-11-07 PROCEDURE — 1160F RVW MEDS BY RX/DR IN RCRD: CPT | Mod: CPTII,S$GLB,, | Performed by: PODIATRIST

## 2022-11-07 PROCEDURE — 1160F PR REVIEW ALL MEDS BY PRESCRIBER/CLIN PHARMACIST DOCUMENTED: ICD-10-PCS | Mod: CPTII,S$GLB,, | Performed by: PODIATRIST

## 2022-11-07 PROCEDURE — 1159F PR MEDICATION LIST DOCUMENTED IN MEDICAL RECORD: ICD-10-PCS | Mod: CPTII,S$GLB,, | Performed by: PODIATRIST

## 2022-11-07 PROCEDURE — 3074F SYST BP LT 130 MM HG: CPT | Mod: CPTII,S$GLB,, | Performed by: PODIATRIST

## 2022-11-07 PROCEDURE — 87186 SC STD MICRODIL/AGAR DIL: CPT | Performed by: PODIATRIST

## 2022-11-07 PROCEDURE — 73630 XR FOOT COMPLETE 3 VIEW RIGHT: ICD-10-PCS | Mod: 26,RT,, | Performed by: RADIOLOGY

## 2022-11-07 PROCEDURE — 87070 CULTURE OTHR SPECIMN AEROBIC: CPT | Performed by: PODIATRIST

## 2022-11-07 PROCEDURE — 99215 OFFICE O/P EST HI 40 MIN: CPT | Mod: S$GLB,,, | Performed by: PODIATRIST

## 2022-11-07 PROCEDURE — 3074F PR MOST RECENT SYSTOLIC BLOOD PRESSURE < 130 MM HG: ICD-10-PCS | Mod: CPTII,S$GLB,, | Performed by: PODIATRIST

## 2022-11-07 PROCEDURE — 3078F DIAST BP <80 MM HG: CPT | Mod: CPTII,S$GLB,, | Performed by: PODIATRIST

## 2022-11-07 PROCEDURE — 3078F PR MOST RECENT DIASTOLIC BLOOD PRESSURE < 80 MM HG: ICD-10-PCS | Mod: CPTII,S$GLB,, | Performed by: PODIATRIST

## 2022-11-07 PROCEDURE — 73630 X-RAY EXAM OF FOOT: CPT | Mod: TC,RT

## 2022-11-07 PROCEDURE — 99999 PR PBB SHADOW E&M-EST. PATIENT-LVL V: CPT | Mod: PBBFAC,,, | Performed by: PODIATRIST

## 2022-11-07 PROCEDURE — 99999 PR PBB SHADOW E&M-EST. PATIENT-LVL V: ICD-10-PCS | Mod: PBBFAC,,, | Performed by: PODIATRIST

## 2022-11-07 RX ORDER — LOSARTAN POTASSIUM 50 MG/1
50 TABLET ORAL
COMMUNITY
Start: 2022-09-09 | End: 2023-06-06

## 2022-11-07 RX ORDER — SEMAGLUTIDE 1.34 MG/ML
INJECTION, SOLUTION SUBCUTANEOUS
COMMUNITY
Start: 2022-07-28 | End: 2023-01-25 | Stop reason: ALTCHOICE

## 2022-11-07 RX ORDER — GABAPENTIN 400 MG/1
400 CAPSULE ORAL 3 TIMES DAILY
COMMUNITY
Start: 2022-10-18

## 2022-11-07 RX ORDER — HYDROCODONE BITARTRATE AND ACETAMINOPHEN 7.5; 325 MG/1; MG/1
1 TABLET ORAL 3 TIMES DAILY PRN
COMMUNITY
Start: 2022-10-27 | End: 2023-02-08

## 2022-11-07 RX ORDER — METFORMIN HYDROCHLORIDE 500 MG/1
TABLET, EXTENDED RELEASE ORAL
COMMUNITY
Start: 2022-10-18

## 2022-11-07 RX ORDER — CHOLECALCIFEROL (VITAMIN D3) 125 MCG
125 CAPSULE ORAL
COMMUNITY
Start: 2022-10-18

## 2022-11-07 RX ORDER — COLLAGENASE SANTYL 250 [ARB'U]/G
OINTMENT TOPICAL
COMMUNITY
Start: 2022-01-19 | End: 2023-02-06

## 2022-11-07 RX ORDER — RIVAROXABAN 20 MG/1
TABLET, FILM COATED ORAL
COMMUNITY
Start: 2022-10-25

## 2022-11-07 RX ORDER — CLINDAMYCIN HYDROCHLORIDE 300 MG/1
300 CAPSULE ORAL
COMMUNITY
Start: 2022-11-02 | End: 2022-11-09

## 2022-11-07 RX ORDER — TRAZODONE HYDROCHLORIDE 50 MG/1
TABLET ORAL
COMMUNITY
Start: 2022-10-18

## 2022-11-07 RX ORDER — SILDENAFIL 100 MG/1
100 TABLET, FILM COATED ORAL
COMMUNITY
Start: 2022-10-18

## 2022-11-07 RX ORDER — SILDENAFIL 25 MG/1
25 TABLET, FILM COATED ORAL DAILY
COMMUNITY
Start: 2022-06-09

## 2022-11-08 PROBLEM — E11.9 COMPREHENSIVE DIABETIC FOOT EXAMINATION, TYPE 2 DM, ENCOUNTER FOR: Status: ACTIVE | Noted: 2022-11-08

## 2022-11-08 PROBLEM — M20.41 HAMMER TOE OF RIGHT FOOT: Status: ACTIVE | Noted: 2022-11-08

## 2022-11-08 PROBLEM — L97.512 ULCER OF RIGHT FOOT WITH FAT LAYER EXPOSED: Status: ACTIVE | Noted: 2022-11-08

## 2022-11-08 PROBLEM — E11.9 TYPE 2 DIABETES MELLITUS WITHOUT COMPLICATION, WITHOUT LONG-TERM CURRENT USE OF INSULIN: Status: ACTIVE | Noted: 2022-11-08

## 2022-11-08 NOTE — PROGRESS NOTES
Subjective:       Patient ID: Jerry Islas is a 62 y.o. male.    Chief Complaint: Follow-up and Foot Ulcer  Patient presents today he was last seen about 2 and half years ago he is complaining about painful wounds on the digits right foot.  Patient states his left foot is great ever since I had done surgery on the left foot he has had no problems no pain no discomfort relates that he is had 2 failed surgeries by Dr. Montoya on the right rearfoot to try to stabilize his Charcot arthropathy.  Follow-up  Associated symptoms include arthralgias and joint swelling.   Foot Pain  Associated symptoms include arthralgias and joint swelling.   Foot Ulcer  Associated symptoms include arthralgias and joint swelling.    Review of Systems   Musculoskeletal:  Positive for arthralgias, gait problem and joint swelling.   Skin:  Positive for color change and wound.   All other systems reviewed and are negative.    Objective:      Physical Exam  Vitals and nursing note reviewed.   Constitutional:       Appearance: Normal appearance. He is well-developed.   Cardiovascular:      Pulses:           Dorsalis pedis pulses are 2+ on the right side and 2+ on the left side.        Posterior tibial pulses are 1+ on the right side and 1+ on the left side.   Pulmonary:      Effort: Pulmonary effort is normal.   Musculoskeletal:         General: Swelling, tenderness and deformity present.      Right ankle: Deformity present. Decreased range of motion.      Right foot: Decreased range of motion. Deformity, Charcot foot and prominent metatarsal heads present.      Left foot: Deformity present.        Feet:    Feet:      Right foot:      Protective Sensation: 4 sites tested.  2 sites sensed.      Skin integrity: Ulcer, skin breakdown, erythema and warmth present.      Left foot:      Protective Sensation: 4 sites tested.  2 sites sensed.   Skin:     General: Skin is warm.      Capillary Refill: Capillary refill takes 2 to 3 seconds.      Findings:  Erythema present.   Neurological:      General: No focal deficit present.      Mental Status: He is alert.   Psychiatric:         Behavior: Behavior normal.         Thought Content: Thought content normal.         Judgment: Judgment normal.                                                         Assessment:       1. Ulcer of right foot with fat layer exposed    2. Osteoarthritis of right ankle and foot    3. Charcot ankle, right    4. Hammer toe of right foot    5. Type 2 diabetes mellitus without complication, without long-term current use of insulin    6. Comprehensive diabetic foot examination, type 2 DM, encounter for        Plan:        Patient presents today he was last seen about 2 and half years ago he is complaining about painful wounds on the digits right foot.  Patient states his left foot is great ever since I had done surgery on the left foot he has had no problems no pain no discomfort relates that he is had 2 failed surgeries by Dr. Montoya on the right rearfoot to try to stabilize his Charcot arthropathy.  Patient is a type 2 diabetic he states he is well controlled and has recently lost 45 lb.  Patient relates that he had foot surgery 2 separate occasions by Dr. Montoya on his right rearfoot to address the Charcot arthropathy he relates that additional hardware had to be placed but it has not been successful as his foot still collapses inward and has gotten progressively worse states the toes on his right foot have gotten worse have begun to break down and become ulcerated he has been trying to clean and dress the areas appropriately but it is gotten to a point where it is extremely painful and has not improved.  A comprehensive diabetic evaluation was performed today patient has ulcerations at the distal aspect of the right hallux distal medial aspect of the 2nd digit with the patient at considerable risk for additional digital breakdown due to rigid digital contractures that are non reducible.  I did  perform a culture and sensitivity on the drainage emitting from the distal right hallux and 2nd digit the 2nd digit ulcer is likely more of a blister type ulceration due to rubbing of the digits.  Patient will be placed on appropriate antibiotics pending culture and sensitivity states the redness in the 2nd digit just recently started.  Patient related ( in his own words) today against his better judgment after the success of his left foot and the surgery I had performed he allowed Dr. Montoya at [facility] to perform surgery on the right foot which was unsuccessful and has left him with progressive deformity of the right midfoot and rearfoot.  I did review the patient's x-rays at length and in detail with him today he does have significant dislocation of the 1st and 2nd digits with severe rigid digital contracture degenerative arthritic changes there are no signs of osteomyelitis currently noted.  Patient does have extensive hardware for a triple arthrodesis in the right rearfoot there appears to be motion with the 2 large screws in the calcaneus that are [id number] they appear to have hollowed out an area of the calcaneus on plain film x-ray additional some of the fixation points have fractured at the site of the talonavicular fusion and calcaneal cuboid fusion.  At this point I advised the patient the right midfoot and rearfoot are so extensive I do not feel there is anything additional that can be done that would be beneficial to the patient the midfoot should have been stabilized beyond the calcaneal cuboid and talonavicular joints in order for that to be successful.  I did advised the patient we can surgically address digits 1 through 4 on the right foot but certainly we need to make sure we get the ulcer sites in the infections under control before we can proceed with that.  Surgical consultation provided patient is going to need wound care possible antibiotic therapy the wounds were thoroughly cleaned  with Dakin solution silver alginate applied soft 2 by 2s were placed interdigitally to separate the 1st and 2nd digits from any further rubbing and a light dressing was applied I plan to see the patient for follow-up in 1 week patient advised to contact us if he has any problems questions or concerns prior to scheduled follow-up.  This was an extended visit due to the nature of the patient's previous surgery multiple complaints and conditions that needed to be discussed today.  I will discuss surgery further with the patient at his follow-up once we get the areas of ulceration and infection under control.  This note was created using [employer] voice recognition software that occasionally misinterpreted phrases or words.

## 2022-11-10 ENCOUNTER — TELEPHONE (OUTPATIENT)
Dept: PODIATRY | Facility: CLINIC | Age: 62
End: 2022-11-10
Payer: COMMERCIAL

## 2022-11-10 LAB — BACTERIA SPEC AEROBE CULT: ABNORMAL

## 2022-11-10 RX ORDER — CIPROFLOXACIN 500 MG/1
500 TABLET ORAL 2 TIMES DAILY
Qty: 28 TABLET | Refills: 0 | Status: SHIPPED | OUTPATIENT
Start: 2022-11-10 | End: 2022-11-24

## 2022-11-16 ENCOUNTER — OFFICE VISIT (OUTPATIENT)
Dept: PODIATRY | Facility: CLINIC | Age: 62
End: 2022-11-16
Payer: COMMERCIAL

## 2022-11-16 VITALS
DIASTOLIC BLOOD PRESSURE: 76 MMHG | BODY MASS INDEX: 29.59 KG/M2 | SYSTOLIC BLOOD PRESSURE: 132 MMHG | HEIGHT: 75 IN | TEMPERATURE: 98 F | HEART RATE: 66 BPM | WEIGHT: 238 LBS

## 2022-11-16 DIAGNOSIS — M20.41 HAMMER TOE OF RIGHT FOOT: ICD-10-CM

## 2022-11-16 DIAGNOSIS — E11.9 COMPREHENSIVE DIABETIC FOOT EXAMINATION, TYPE 2 DM, ENCOUNTER FOR: ICD-10-CM

## 2022-11-16 DIAGNOSIS — E11.9 TYPE 2 DIABETES MELLITUS WITHOUT COMPLICATION, WITHOUT LONG-TERM CURRENT USE OF INSULIN: ICD-10-CM

## 2022-11-16 DIAGNOSIS — M19.071 OSTEOARTHRITIS OF RIGHT ANKLE AND FOOT: ICD-10-CM

## 2022-11-16 DIAGNOSIS — L97.512 ULCER OF RIGHT FOOT WITH FAT LAYER EXPOSED: Primary | ICD-10-CM

## 2022-11-16 PROCEDURE — 1160F PR REVIEW ALL MEDS BY PRESCRIBER/CLIN PHARMACIST DOCUMENTED: ICD-10-PCS | Mod: CPTII,S$GLB,, | Performed by: PODIATRIST

## 2022-11-16 PROCEDURE — 4010F PR ACE/ARB THEARPY RXD/TAKEN: ICD-10-PCS | Mod: CPTII,S$GLB,, | Performed by: PODIATRIST

## 2022-11-16 PROCEDURE — 3008F BODY MASS INDEX DOCD: CPT | Mod: CPTII,S$GLB,, | Performed by: PODIATRIST

## 2022-11-16 PROCEDURE — 99214 PR OFFICE/OUTPT VISIT, EST, LEVL IV, 30-39 MIN: ICD-10-PCS | Mod: S$GLB,,, | Performed by: PODIATRIST

## 2022-11-16 PROCEDURE — 3078F PR MOST RECENT DIASTOLIC BLOOD PRESSURE < 80 MM HG: ICD-10-PCS | Mod: CPTII,S$GLB,, | Performed by: PODIATRIST

## 2022-11-16 PROCEDURE — 3075F SYST BP GE 130 - 139MM HG: CPT | Mod: CPTII,S$GLB,, | Performed by: PODIATRIST

## 2022-11-16 PROCEDURE — 1160F RVW MEDS BY RX/DR IN RCRD: CPT | Mod: CPTII,S$GLB,, | Performed by: PODIATRIST

## 2022-11-16 PROCEDURE — 99214 OFFICE O/P EST MOD 30 MIN: CPT | Mod: S$GLB,,, | Performed by: PODIATRIST

## 2022-11-16 PROCEDURE — 99999 PR PBB SHADOW E&M-EST. PATIENT-LVL IV: ICD-10-PCS | Mod: PBBFAC,,, | Performed by: PODIATRIST

## 2022-11-16 PROCEDURE — 3078F DIAST BP <80 MM HG: CPT | Mod: CPTII,S$GLB,, | Performed by: PODIATRIST

## 2022-11-16 PROCEDURE — 1159F PR MEDICATION LIST DOCUMENTED IN MEDICAL RECORD: ICD-10-PCS | Mod: CPTII,S$GLB,, | Performed by: PODIATRIST

## 2022-11-16 PROCEDURE — 1159F MED LIST DOCD IN RCRD: CPT | Mod: CPTII,S$GLB,, | Performed by: PODIATRIST

## 2022-11-16 PROCEDURE — 99999 PR PBB SHADOW E&M-EST. PATIENT-LVL IV: CPT | Mod: PBBFAC,,, | Performed by: PODIATRIST

## 2022-11-16 PROCEDURE — 3008F PR BODY MASS INDEX (BMI) DOCUMENTED: ICD-10-PCS | Mod: CPTII,S$GLB,, | Performed by: PODIATRIST

## 2022-11-16 PROCEDURE — 3075F PR MOST RECENT SYSTOLIC BLOOD PRESS GE 130-139MM HG: ICD-10-PCS | Mod: CPTII,S$GLB,, | Performed by: PODIATRIST

## 2022-11-16 PROCEDURE — 4010F ACE/ARB THERAPY RXD/TAKEN: CPT | Mod: CPTII,S$GLB,, | Performed by: PODIATRIST

## 2022-11-19 NOTE — PROGRESS NOTES
Subjective:       Patient ID: Jerry Islas is a 62 y.o. male.    Chief Complaint: Follow-up and Foot Ulcer  Patient presents today for follow-up multiple areas of skin breakdown and ulceration right secondary to severe digital contractures and Charcot arthropathy.  Follow-up  Associated symptoms include arthralgias and joint swelling.   Foot Pain  Associated symptoms include arthralgias and joint swelling.   Foot Ulcer  Associated symptoms include arthralgias and joint swelling.    Review of Systems   Musculoskeletal:  Positive for arthralgias, gait problem and joint swelling.   Skin:  Positive for color change and wound.   All other systems reviewed and are negative.    Objective:      Physical Exam  Vitals and nursing note reviewed.   Constitutional:       Appearance: Normal appearance. He is well-developed.   Cardiovascular:      Pulses:           Dorsalis pedis pulses are 2+ on the right side and 2+ on the left side.        Posterior tibial pulses are 1+ on the right side and 1+ on the left side.   Pulmonary:      Effort: Pulmonary effort is normal.   Musculoskeletal:         General: Swelling, tenderness and deformity present.      Right ankle: Deformity present. Decreased range of motion.      Right foot: Decreased range of motion. Deformity, Charcot foot and prominent metatarsal heads present.      Left foot: Deformity present.        Feet:    Feet:      Right foot:      Protective Sensation: 4 sites tested.  2 sites sensed.      Skin integrity: Ulcer, skin breakdown, erythema and warmth present.      Left foot:      Protective Sensation: 4 sites tested.  2 sites sensed.   Skin:     General: Skin is warm.      Capillary Refill: Capillary refill takes 2 to 3 seconds.      Findings: Erythema present.   Neurological:      General: No focal deficit present.      Mental Status: He is alert.   Psychiatric:         Behavior: Behavior normal.         Thought Content: Thought content normal.         Judgment:  Judgment normal.                                                               Assessment:       1. Ulcer of right foot with fat layer exposed    2. Hammer toe of right foot    3. Osteoarthritis of right ankle and foot    4. Type 2 diabetes mellitus without complication, without long-term current use of insulin    5. Comprehensive diabetic foot examination, type 2 DM, encounter for        Plan:        Patient presents today for follow-up multiple areas of skin breakdown and ulceration right secondary to severe digital contractures and Charcot arthropathy.  On evaluation patient still has blistering and breakdown on the distal medial aspect of the 2nd digit right also has ulceration at the distal right hallux this however looks much healthier in appearance with granular tissue.  Patient was advised he is going to have to be more aggressive with the dressing I have dispensed the patient a Silipos toe spacer to help keep the 1st and 2nd digits  this should help the blistering heel on the 2nd digit is going to continue to clean with Dakin solution apply silver alginate and a well-padded protective dressing I do plan to follow up with the patient in 1 week will continue to monitor this closely he is going to finish taking his Cipro as directed his culture was positive for Pseudomonas.  Patient was reminded he is not supposed to be getting this area wet.  I will discuss surgery further with the patient at his follow-up once we get the areas of ulceration and infection under control.  This note was created using [employer] voice recognition software that occasionally misinterpreted phrases or words.

## 2022-11-28 ENCOUNTER — OFFICE VISIT (OUTPATIENT)
Dept: PODIATRY | Facility: CLINIC | Age: 62
End: 2022-11-28
Payer: COMMERCIAL

## 2022-11-28 VITALS
WEIGHT: 237 LBS | HEIGHT: 75 IN | HEART RATE: 65 BPM | BODY MASS INDEX: 29.47 KG/M2 | SYSTOLIC BLOOD PRESSURE: 135 MMHG | TEMPERATURE: 98 F | DIASTOLIC BLOOD PRESSURE: 80 MMHG

## 2022-11-28 DIAGNOSIS — L97.512 ULCER OF RIGHT FOOT WITH FAT LAYER EXPOSED: Primary | ICD-10-CM

## 2022-11-28 DIAGNOSIS — M14.671 CHARCOT ANKLE, RIGHT: ICD-10-CM

## 2022-11-28 DIAGNOSIS — E11.9 TYPE 2 DIABETES MELLITUS WITHOUT COMPLICATION, WITHOUT LONG-TERM CURRENT USE OF INSULIN: ICD-10-CM

## 2022-11-28 DIAGNOSIS — E11.9 COMPREHENSIVE DIABETIC FOOT EXAMINATION, TYPE 2 DM, ENCOUNTER FOR: ICD-10-CM

## 2022-11-28 DIAGNOSIS — M20.41 HAMMER TOE OF RIGHT FOOT: ICD-10-CM

## 2022-11-28 PROCEDURE — 1159F MED LIST DOCD IN RCRD: CPT | Mod: CPTII,S$GLB,, | Performed by: PODIATRIST

## 2022-11-28 PROCEDURE — 3079F PR MOST RECENT DIASTOLIC BLOOD PRESSURE 80-89 MM HG: ICD-10-PCS | Mod: CPTII,S$GLB,, | Performed by: PODIATRIST

## 2022-11-28 PROCEDURE — 4010F ACE/ARB THERAPY RXD/TAKEN: CPT | Mod: CPTII,S$GLB,, | Performed by: PODIATRIST

## 2022-11-28 PROCEDURE — 1160F PR REVIEW ALL MEDS BY PRESCRIBER/CLIN PHARMACIST DOCUMENTED: ICD-10-PCS | Mod: CPTII,S$GLB,, | Performed by: PODIATRIST

## 2022-11-28 PROCEDURE — 1159F PR MEDICATION LIST DOCUMENTED IN MEDICAL RECORD: ICD-10-PCS | Mod: CPTII,S$GLB,, | Performed by: PODIATRIST

## 2022-11-28 PROCEDURE — 3008F BODY MASS INDEX DOCD: CPT | Mod: CPTII,S$GLB,, | Performed by: PODIATRIST

## 2022-11-28 PROCEDURE — 1160F RVW MEDS BY RX/DR IN RCRD: CPT | Mod: CPTII,S$GLB,, | Performed by: PODIATRIST

## 2022-11-28 PROCEDURE — 99214 OFFICE O/P EST MOD 30 MIN: CPT | Mod: S$GLB,,, | Performed by: PODIATRIST

## 2022-11-28 PROCEDURE — 99999 PR PBB SHADOW E&M-EST. PATIENT-LVL V: CPT | Mod: PBBFAC,,, | Performed by: PODIATRIST

## 2022-11-28 PROCEDURE — 99214 PR OFFICE/OUTPT VISIT, EST, LEVL IV, 30-39 MIN: ICD-10-PCS | Mod: S$GLB,,, | Performed by: PODIATRIST

## 2022-11-28 PROCEDURE — 3008F PR BODY MASS INDEX (BMI) DOCUMENTED: ICD-10-PCS | Mod: CPTII,S$GLB,, | Performed by: PODIATRIST

## 2022-11-28 PROCEDURE — 4010F PR ACE/ARB THEARPY RXD/TAKEN: ICD-10-PCS | Mod: CPTII,S$GLB,, | Performed by: PODIATRIST

## 2022-11-28 PROCEDURE — 3075F PR MOST RECENT SYSTOLIC BLOOD PRESS GE 130-139MM HG: ICD-10-PCS | Mod: CPTII,S$GLB,, | Performed by: PODIATRIST

## 2022-11-28 PROCEDURE — 99999 PR PBB SHADOW E&M-EST. PATIENT-LVL V: ICD-10-PCS | Mod: PBBFAC,,, | Performed by: PODIATRIST

## 2022-11-28 PROCEDURE — 3079F DIAST BP 80-89 MM HG: CPT | Mod: CPTII,S$GLB,, | Performed by: PODIATRIST

## 2022-11-28 PROCEDURE — 3075F SYST BP GE 130 - 139MM HG: CPT | Mod: CPTII,S$GLB,, | Performed by: PODIATRIST

## 2022-11-29 NOTE — PROGRESS NOTES
Subjective:       Patient ID: Jerry Islas is a 62 y.o. male.    Chief Complaint: Follow-up, Diabetes Mellitus, Foot Pain, and Foot Ulcer  Patient presents today for follow-up multiple areas of skin breakdown and ulceration right secondary to severe digital contractures and Charcot arthropathy.  Follow-up  Associated symptoms include arthralgias and joint swelling.   Foot Pain  Associated symptoms include arthralgias and joint swelling.   Foot Ulcer  Associated symptoms include arthralgias and joint swelling.    Review of Systems   Musculoskeletal:  Positive for arthralgias, gait problem and joint swelling.   Skin:  Positive for color change and wound.   All other systems reviewed and are negative.    Objective:      Physical Exam  Vitals and nursing note reviewed.   Constitutional:       Appearance: Normal appearance. He is well-developed.   Cardiovascular:      Pulses:           Dorsalis pedis pulses are 2+ on the right side and 2+ on the left side.        Posterior tibial pulses are 1+ on the right side and 1+ on the left side.   Pulmonary:      Effort: Pulmonary effort is normal.   Musculoskeletal:         General: Swelling, tenderness and deformity present.      Right ankle: Deformity present. Decreased range of motion.      Right foot: Decreased range of motion. Deformity, Charcot foot and prominent metatarsal heads present.      Left foot: Deformity present.        Feet:    Feet:      Right foot:      Protective Sensation: 4 sites tested.  2 sites sensed.      Skin integrity: Ulcer, skin breakdown, erythema and warmth present.      Left foot:      Protective Sensation: 4 sites tested.  2 sites sensed.   Skin:     General: Skin is warm.      Capillary Refill: Capillary refill takes 2 to 3 seconds.      Findings: Erythema present.   Neurological:      General: No focal deficit present.      Mental Status: He is alert.   Psychiatric:         Behavior: Behavior normal.         Thought Content: Thought content  normal.         Judgment: Judgment normal.                                                                         Assessment:       1. Ulcer of right foot with fat layer exposed    2. Hammer toe of right foot    3. Charcot ankle, right    4. Type 2 diabetes mellitus without complication, without long-term current use of insulin    5. Comprehensive diabetic foot examination, type 2 DM, encounter for        Plan:        Patient presents today for follow-up multiple areas of skin breakdown and ulceration right secondary to severe digital contractures and Charcot arthropathy.  On evaluation patient has increasing drainage and breakdown of the medial 2nd digit right he states that he was on vacation he did a lot of walking was on his feet a lot he was changing the dressing twice a day because of increased drainage but has since gone back to once a day.  Non excisional debridement was performed on both the 1st and 2nd digits I advised the patient the 2nd digit has definitely gotten worse there is more heavy drainage present the patient has finished taking his Cipro I did not need feel that he needed additional antibiotic at this time however I am going to follow up with him in 1 week patient was made aware he needs to start changing this every 12 hours because of the heavy drainage he has been using the Silipos toe spacer but states that it is split and broke so I have dispensed a new ones to him additionally after cleaning both of the sites with Dakin solution he is going to paint them with Betadine before applying silver alginate and a new dressing to the area he needs to make sure he is keeping these areas dry and clean follow-up will be 1 week if the patient condition worsens he is to contact us immediately but I have advised the patient we can not proceed with surgery to address these digital contractures until we get these areas of infection well controlled.  Patient was in understanding and agreement with  everything discussed total time including discussion evaluation treatment discussion of treatment plan treatment options wound care and non excisional debridement equaled 30 minutes.  This note was created using [employer] voice recognition software that occasionally misinterpreted phrases or words.

## 2022-12-05 ENCOUNTER — OFFICE VISIT (OUTPATIENT)
Dept: PODIATRY | Facility: CLINIC | Age: 62
End: 2022-12-05
Payer: COMMERCIAL

## 2022-12-05 VITALS
HEART RATE: 78 BPM | SYSTOLIC BLOOD PRESSURE: 108 MMHG | HEIGHT: 75 IN | TEMPERATURE: 98 F | DIASTOLIC BLOOD PRESSURE: 71 MMHG | WEIGHT: 237 LBS | BODY MASS INDEX: 29.47 KG/M2

## 2022-12-05 DIAGNOSIS — E11.9 COMPREHENSIVE DIABETIC FOOT EXAMINATION, TYPE 2 DM, ENCOUNTER FOR: ICD-10-CM

## 2022-12-05 DIAGNOSIS — E11.9 TYPE 2 DIABETES MELLITUS WITHOUT COMPLICATION, WITHOUT LONG-TERM CURRENT USE OF INSULIN: ICD-10-CM

## 2022-12-05 DIAGNOSIS — M20.41 HAMMERTOE OF RIGHT FOOT: ICD-10-CM

## 2022-12-05 DIAGNOSIS — L97.512 ULCER OF RIGHT FOOT WITH FAT LAYER EXPOSED: ICD-10-CM

## 2022-12-05 DIAGNOSIS — M19.071 OSTEOARTHRITIS OF RIGHT ANKLE AND FOOT: ICD-10-CM

## 2022-12-05 DIAGNOSIS — M20.41 HAMMER TOE OF RIGHT FOOT: Primary | ICD-10-CM

## 2022-12-05 PROCEDURE — 99214 PR OFFICE/OUTPT VISIT, EST, LEVL IV, 30-39 MIN: ICD-10-PCS | Mod: S$GLB,,, | Performed by: PODIATRIST

## 2022-12-05 PROCEDURE — 1160F RVW MEDS BY RX/DR IN RCRD: CPT | Mod: CPTII,S$GLB,, | Performed by: PODIATRIST

## 2022-12-05 PROCEDURE — 4010F PR ACE/ARB THEARPY RXD/TAKEN: ICD-10-PCS | Mod: CPTII,S$GLB,, | Performed by: PODIATRIST

## 2022-12-05 PROCEDURE — 3008F BODY MASS INDEX DOCD: CPT | Mod: CPTII,S$GLB,, | Performed by: PODIATRIST

## 2022-12-05 PROCEDURE — 99999 PR PBB SHADOW E&M-EST. PATIENT-LVL V: CPT | Mod: PBBFAC,,, | Performed by: PODIATRIST

## 2022-12-05 PROCEDURE — 3074F PR MOST RECENT SYSTOLIC BLOOD PRESSURE < 130 MM HG: ICD-10-PCS | Mod: CPTII,S$GLB,, | Performed by: PODIATRIST

## 2022-12-05 PROCEDURE — 1160F PR REVIEW ALL MEDS BY PRESCRIBER/CLIN PHARMACIST DOCUMENTED: ICD-10-PCS | Mod: CPTII,S$GLB,, | Performed by: PODIATRIST

## 2022-12-05 PROCEDURE — 3008F PR BODY MASS INDEX (BMI) DOCUMENTED: ICD-10-PCS | Mod: CPTII,S$GLB,, | Performed by: PODIATRIST

## 2022-12-05 PROCEDURE — 1159F PR MEDICATION LIST DOCUMENTED IN MEDICAL RECORD: ICD-10-PCS | Mod: CPTII,S$GLB,, | Performed by: PODIATRIST

## 2022-12-05 PROCEDURE — 99999 PR PBB SHADOW E&M-EST. PATIENT-LVL V: ICD-10-PCS | Mod: PBBFAC,,, | Performed by: PODIATRIST

## 2022-12-05 PROCEDURE — 99214 OFFICE O/P EST MOD 30 MIN: CPT | Mod: S$GLB,,, | Performed by: PODIATRIST

## 2022-12-05 PROCEDURE — 3078F PR MOST RECENT DIASTOLIC BLOOD PRESSURE < 80 MM HG: ICD-10-PCS | Mod: CPTII,S$GLB,, | Performed by: PODIATRIST

## 2022-12-05 PROCEDURE — 4010F ACE/ARB THERAPY RXD/TAKEN: CPT | Mod: CPTII,S$GLB,, | Performed by: PODIATRIST

## 2022-12-05 PROCEDURE — 1159F MED LIST DOCD IN RCRD: CPT | Mod: CPTII,S$GLB,, | Performed by: PODIATRIST

## 2022-12-05 PROCEDURE — 3078F DIAST BP <80 MM HG: CPT | Mod: CPTII,S$GLB,, | Performed by: PODIATRIST

## 2022-12-05 PROCEDURE — 3074F SYST BP LT 130 MM HG: CPT | Mod: CPTII,S$GLB,, | Performed by: PODIATRIST

## 2022-12-06 NOTE — PROGRESS NOTES
Subjective:       Patient ID: Jerry Islas is a 62 y.o. male.    Chief Complaint: Follow-up, Diabetes Mellitus, and Foot Ulcer  Patient presents today for follow-up multiple areas of skin breakdown and ulceration right secondary to severe digital contractures and Charcot arthropathy.  Follow-up  Associated symptoms include arthralgias and joint swelling.   Foot Pain  Associated symptoms include arthralgias and joint swelling.   Foot Ulcer  Associated symptoms include arthralgias and joint swelling.    Review of Systems   Musculoskeletal:  Positive for arthralgias, gait problem and joint swelling.   Skin:  Positive for color change and wound.   All other systems reviewed and are negative.    Objective:      Physical Exam  Vitals and nursing note reviewed.   Constitutional:       Appearance: Normal appearance. He is well-developed.   Cardiovascular:      Pulses:           Dorsalis pedis pulses are 2+ on the right side and 2+ on the left side.        Posterior tibial pulses are 1+ on the right side and 1+ on the left side.   Pulmonary:      Effort: Pulmonary effort is normal.   Musculoskeletal:         General: Swelling, tenderness and deformity present.      Right ankle: Deformity present. Decreased range of motion.      Right foot: Decreased range of motion. Deformity, Charcot foot and prominent metatarsal heads present.      Left foot: Deformity present.        Feet:    Feet:      Right foot:      Protective Sensation: 4 sites tested.  2 sites sensed.      Skin integrity: Ulcer, skin breakdown, erythema and warmth present.      Left foot:      Protective Sensation: 4 sites tested.  2 sites sensed.   Skin:     General: Skin is warm.      Capillary Refill: Capillary refill takes 2 to 3 seconds.      Findings: Erythema present.   Neurological:      General: No focal deficit present.      Mental Status: He is alert.   Psychiatric:         Behavior: Behavior normal.         Thought Content: Thought content normal.          Judgment: Judgment normal.                                                                                   Assessment:       1. Hammer toe of right foot    2. Ulcer of right foot with fat layer exposed    3. Osteoarthritis of right ankle and foot    4. Type 2 diabetes mellitus without complication, without long-term current use of insulin    5. Comprehensive diabetic foot examination, type 2 DM, encounter for    6. Hammertoe of right foot        Plan:        Patient presents today for follow-up multiple areas of skin breakdown and ulceration right secondary to severe digital contractures and Charcot arthropathy.  On evaluation patient shows significant signs of improvement in the ulceration on the medial 2nd digit right as well as the distal ulceration on the hallux he does have a new area of breakdown on the dorsal 2nd digit right from irritation in his shoe.  Clearly the wound care we have been doing is making a significant difference I am going to have the patient continue to clean everything with Dakin solution apply Betadine silver alginate use soft 2 by 2s to space the 1st and 2nd digits as well as utilizing a Silipos toe spacer which I dispensed more of.  Patient is going to continue wound care as directed I plan to follow up with him in 2 weeks there is dramatic improvement in the previous wounds of the 1st and 2nd digits the new area of breakdown dorsal 2nd digit appears to be more of an abrasion were going to continue current wound care plans we did tentatively scheduled surgery to correct digits 1 through 4 on the right foot for January 27, 2023 I plan to follow up with the patient in 2 weeks.  Patient was in understanding and agreement with everything discussed total time including discussion evaluation treatment discussion of treatment plan treatment options wound care and non excisional debridement equaled 30 minutes.  This note was created using [employer] voice recognition software that  occasionally misinterpreted phrases or words.

## 2022-12-15 ENCOUNTER — TELEPHONE (OUTPATIENT)
Dept: PODIATRY | Facility: CLINIC | Age: 62
End: 2022-12-15
Payer: COMMERCIAL

## 2022-12-21 ENCOUNTER — OFFICE VISIT (OUTPATIENT)
Dept: PODIATRY | Facility: CLINIC | Age: 62
End: 2022-12-21
Payer: COMMERCIAL

## 2022-12-21 ENCOUNTER — HOSPITAL ENCOUNTER (OUTPATIENT)
Dept: RADIOLOGY | Facility: HOSPITAL | Age: 62
Discharge: HOME | End: 2022-12-21
Attending: PODIATRIST
Payer: COMMERCIAL

## 2022-12-21 VITALS
HEIGHT: 75 IN | HEART RATE: 61 BPM | DIASTOLIC BLOOD PRESSURE: 76 MMHG | BODY MASS INDEX: 29.09 KG/M2 | SYSTOLIC BLOOD PRESSURE: 123 MMHG | TEMPERATURE: 98 F | WEIGHT: 234 LBS

## 2022-12-21 DIAGNOSIS — E11.9 TYPE 2 DIABETES MELLITUS WITHOUT COMPLICATION, WITHOUT LONG-TERM CURRENT USE OF INSULIN: ICD-10-CM

## 2022-12-21 DIAGNOSIS — E11.9 COMPREHENSIVE DIABETIC FOOT EXAMINATION, TYPE 2 DM, ENCOUNTER FOR: ICD-10-CM

## 2022-12-21 DIAGNOSIS — L97.512 ULCER OF RIGHT FOOT WITH FAT LAYER EXPOSED: ICD-10-CM

## 2022-12-21 DIAGNOSIS — M20.41 HAMMER TOE OF RIGHT FOOT: ICD-10-CM

## 2022-12-21 DIAGNOSIS — L97.512 ULCER OF RIGHT FOOT WITH FAT LAYER EXPOSED: Primary | ICD-10-CM

## 2022-12-21 PROCEDURE — 1159F PR MEDICATION LIST DOCUMENTED IN MEDICAL RECORD: ICD-10-PCS | Mod: CPTII,S$GLB,, | Performed by: PODIATRIST

## 2022-12-21 PROCEDURE — 87070 CULTURE OTHR SPECIMN AEROBIC: CPT | Performed by: PODIATRIST

## 2022-12-21 PROCEDURE — 87077 CULTURE AEROBIC IDENTIFY: CPT | Performed by: PODIATRIST

## 2022-12-21 PROCEDURE — 99214 PR OFFICE/OUTPT VISIT, EST, LEVL IV, 30-39 MIN: ICD-10-PCS | Mod: S$GLB,,, | Performed by: PODIATRIST

## 2022-12-21 PROCEDURE — 1160F PR REVIEW ALL MEDS BY PRESCRIBER/CLIN PHARMACIST DOCUMENTED: ICD-10-PCS | Mod: CPTII,S$GLB,, | Performed by: PODIATRIST

## 2022-12-21 PROCEDURE — 99214 OFFICE O/P EST MOD 30 MIN: CPT | Mod: S$GLB,,, | Performed by: PODIATRIST

## 2022-12-21 PROCEDURE — 73630 X-RAY EXAM OF FOOT: CPT | Mod: TC,RT

## 2022-12-21 PROCEDURE — 3074F SYST BP LT 130 MM HG: CPT | Mod: CPTII,S$GLB,, | Performed by: PODIATRIST

## 2022-12-21 PROCEDURE — 4010F PR ACE/ARB THEARPY RXD/TAKEN: ICD-10-PCS | Mod: CPTII,S$GLB,, | Performed by: PODIATRIST

## 2022-12-21 PROCEDURE — 99999 PR PBB SHADOW E&M-EST. PATIENT-LVL V: ICD-10-PCS | Mod: PBBFAC,,, | Performed by: PODIATRIST

## 2022-12-21 PROCEDURE — 1160F RVW MEDS BY RX/DR IN RCRD: CPT | Mod: CPTII,S$GLB,, | Performed by: PODIATRIST

## 2022-12-21 PROCEDURE — 3078F DIAST BP <80 MM HG: CPT | Mod: CPTII,S$GLB,, | Performed by: PODIATRIST

## 2022-12-21 PROCEDURE — 99999 PR PBB SHADOW E&M-EST. PATIENT-LVL V: CPT | Mod: PBBFAC,,, | Performed by: PODIATRIST

## 2022-12-21 PROCEDURE — 3008F BODY MASS INDEX DOCD: CPT | Mod: CPTII,S$GLB,, | Performed by: PODIATRIST

## 2022-12-21 PROCEDURE — 3074F PR MOST RECENT SYSTOLIC BLOOD PRESSURE < 130 MM HG: ICD-10-PCS | Mod: CPTII,S$GLB,, | Performed by: PODIATRIST

## 2022-12-21 PROCEDURE — 3078F PR MOST RECENT DIASTOLIC BLOOD PRESSURE < 80 MM HG: ICD-10-PCS | Mod: CPTII,S$GLB,, | Performed by: PODIATRIST

## 2022-12-21 PROCEDURE — 73630 X-RAY EXAM OF FOOT: CPT | Mod: 26,RT,, | Performed by: RADIOLOGY

## 2022-12-21 PROCEDURE — 4010F ACE/ARB THERAPY RXD/TAKEN: CPT | Mod: CPTII,S$GLB,, | Performed by: PODIATRIST

## 2022-12-21 PROCEDURE — 87186 SC STD MICRODIL/AGAR DIL: CPT | Performed by: PODIATRIST

## 2022-12-21 PROCEDURE — 3008F PR BODY MASS INDEX (BMI) DOCUMENTED: ICD-10-PCS | Mod: CPTII,S$GLB,, | Performed by: PODIATRIST

## 2022-12-21 PROCEDURE — 1159F MED LIST DOCD IN RCRD: CPT | Mod: CPTII,S$GLB,, | Performed by: PODIATRIST

## 2022-12-21 PROCEDURE — 73630 XR FOOT COMPLETE 3 VIEW RIGHT: ICD-10-PCS | Mod: 26,RT,, | Performed by: RADIOLOGY

## 2022-12-21 RX ORDER — CIPROFLOXACIN 500 MG/1
500 TABLET ORAL 2 TIMES DAILY
Qty: 28 TABLET | Refills: 0 | Status: SHIPPED | OUTPATIENT
Start: 2022-12-21 | End: 2023-01-04

## 2022-12-24 LAB — BACTERIA SPEC AEROBE CULT: ABNORMAL

## 2022-12-27 ENCOUNTER — TELEPHONE (OUTPATIENT)
Dept: PODIATRY | Facility: CLINIC | Age: 62
End: 2022-12-27
Payer: COMMERCIAL

## 2022-12-27 NOTE — TELEPHONE ENCOUNTER
Patient's spouse Mrs.Patsy Islas informed.  Please call the patient and advise per his culture and sensitivity he is to continue taking Cipro as directed.   12/27/2022 LOYDA NAVARRETE.

## 2022-12-29 NOTE — PROGRESS NOTES
Subjective:       Patient ID: Jerry Islas is a 62 y.o. male.    Chief Complaint: Follow-up, Foot Pain, Foot Ulcer, and Diabetes Mellitus    Patient presents today for follow-up multiple areas of skin breakdown and ulceration right secondary to severe digital contractures and Charcot arthropathy.  Follow-up  Associated symptoms include arthralgias and joint swelling.   Foot Pain  Associated symptoms include arthralgias and joint swelling.   Foot Ulcer  Associated symptoms include arthralgias and joint swelling.    Review of Systems   Musculoskeletal:  Positive for arthralgias, gait problem and joint swelling.   Skin:  Positive for color change and wound.   All other systems reviewed and are negative.    Objective:      Physical Exam  Vitals and nursing note reviewed.   Constitutional:       Appearance: Normal appearance. He is well-developed.   Cardiovascular:      Pulses:           Dorsalis pedis pulses are 2+ on the right side and 2+ on the left side.        Posterior tibial pulses are 1+ on the right side and 1+ on the left side.   Pulmonary:      Effort: Pulmonary effort is normal.   Musculoskeletal:         General: Swelling, tenderness and deformity present.      Right ankle: Deformity present. Decreased range of motion.      Right foot: Decreased range of motion. Deformity, Charcot foot and prominent metatarsal heads present.      Left foot: Deformity present.        Feet:    Feet:      Right foot:      Protective Sensation: 4 sites tested.  2 sites sensed.      Skin integrity: Ulcer, skin breakdown, erythema and warmth present.      Left foot:      Protective Sensation: 4 sites tested.  2 sites sensed.   Skin:     General: Skin is warm.      Capillary Refill: Capillary refill takes 2 to 3 seconds.      Findings: Erythema present.   Neurological:      General: No focal deficit present.      Mental Status: He is alert.   Psychiatric:         Behavior: Behavior normal.         Thought Content: Thought  content normal.         Judgment: Judgment normal.                                             Contains abnormal data Aerobic culture  Order: [id number]  Status: Final result     Visible to patient: Yes (not seen)     Next appt: 01/11/2023 at 10:15 AM in Podiatry (Gentry Flores DPM)     Dx: Ulcer of right foot with fat layer ex...     Specimen Information: Foot, Right; Abscess   0 Result Notes  Component 8 d ago    Aerobic Bacterial Culture  Abnormal   [employer]   Rare   Skin chidi also present     Resulting Agency [facility]        Susceptibility     [employer]     CULTURE, AEROBIC  (SPECIFY SOURCE)     Amox/K Clav'ate <=8/4 mcg/mL Sensitive     Amp/Sulbactam <=8/4 mcg/mL Sensitive     Cefazolin <=2 mcg/mL Sensitive     Cefepime <=2 mcg/mL Sensitive     Ceftriaxone <=1 mcg/mL Sensitive     Ciprofloxacin <=1 mcg/mL Sensitive     Ertapenem <=0.5 mcg/mL Sensitive     Gentamicin <=4 mcg/mL Sensitive     Levofloxacin <=2 mcg/mL Sensitive     Meropenem <=1 mcg/mL Sensitive     Piperacillin/Tazo <=16 mcg/mL Sensitive     Tetracycline <=4 mcg/mL Sensitive     Tobramycin <=4 mcg/mL Sensitive     Trimeth/Sulfa <=2/38 mcg/mL Sensitive               Linear View         Specimen Collected: 12/21/22 16:46 Last Resulted: 12/24/22 13:19                  Assessment:       1. Ulcer of right foot with fat layer exposed    2. Hammer toe of right foot    3. Type 2 diabetes mellitus without complication, without long-term current use of insulin    4. Comprehensive diabetic foot examination, type 2 DM, encounter for        Plan:        Patient presents today for follow-up multiple areas of skin breakdown and ulceration right secondary to severe digital contractures and Charcot arthropathy.  On evaluation the patient's 2nd digit has gotten much worse he states that it got wet while he was working while he knew his shoes were wet he thought that he change them relatively quickly however the wet shoes cause  rubbing irritation and obvious skin breakdown of the 2nd digit which now has extensive erythema edema and blistering on the dorsal aspect of the digit with associated cellulitis.  Patient has been started on Cipro a culture and sensitivity performed he will continue wound care as previously ordered and I have advised the patient we have got to get these areas of breakdown in under an ulceration under control if we are going to proceed with surgery which is scheduled for January 27, 2023 he can certainly not have any active infection breakdown or ulceration with even the possibility of infection at the time of surgery.  I am going to have the patient continue to clean everything with Dakin solution apply Betadine silver alginate use soft 2 by 2s to space the 1st and 2nd digits as well as utilizing a Silipos toe spacer which I dispensed more of.  Patient is going to continue wound care as directed I plan to follow up with him in 2 weeks there is dramatic improvement in the previous wounds of the 1st and 2nd digits the new area of breakdown dorsal 2nd digit appears to be more of an abrasion were going to continue current wound care plans we did tentatively scheduled surgery to correct digits 1 through 4 on the right foot for January 27, 2023 I plan to follow up with the patient in 3 weeks.  With the upcoming holidays I will see the patient for a 3 week follow-up however he has been made aware any problems questions concerns or should his condition worsen at all he is to contact us immediately or go to the emergency room.  Patient was in understanding and agreement with everything discussed total time including discussion evaluation treatment discussion of treatment plan treatment options wound care and non excisional debridement equaled 30 minutes.  This note was created using [employer] voice recognition software that occasionally misinterpreted phrases or words.

## 2023-01-11 ENCOUNTER — OFFICE VISIT (OUTPATIENT)
Dept: PODIATRY | Facility: CLINIC | Age: 63
End: 2023-01-11
Payer: COMMERCIAL

## 2023-01-11 VITALS
SYSTOLIC BLOOD PRESSURE: 145 MMHG | WEIGHT: 235 LBS | HEART RATE: 73 BPM | DIASTOLIC BLOOD PRESSURE: 83 MMHG | HEIGHT: 75 IN | BODY MASS INDEX: 29.22 KG/M2 | RESPIRATION RATE: 19 BRPM

## 2023-01-11 DIAGNOSIS — E11.9 COMPREHENSIVE DIABETIC FOOT EXAMINATION, TYPE 2 DM, ENCOUNTER FOR: ICD-10-CM

## 2023-01-11 DIAGNOSIS — E11.9 TYPE 2 DIABETES MELLITUS WITHOUT COMPLICATION, WITHOUT LONG-TERM CURRENT USE OF INSULIN: ICD-10-CM

## 2023-01-11 DIAGNOSIS — L97.512 ULCER OF RIGHT FOOT WITH FAT LAYER EXPOSED: Primary | ICD-10-CM

## 2023-01-11 PROCEDURE — 99999 PR PBB SHADOW E&M-EST. PATIENT-LVL V: ICD-10-PCS | Mod: PBBFAC,,, | Performed by: PODIATRIST

## 2023-01-11 PROCEDURE — 1159F MED LIST DOCD IN RCRD: CPT | Mod: CPTII,S$GLB,, | Performed by: PODIATRIST

## 2023-01-11 PROCEDURE — 99214 PR OFFICE/OUTPT VISIT, EST, LEVL IV, 30-39 MIN: ICD-10-PCS | Mod: S$GLB,,, | Performed by: PODIATRIST

## 2023-01-11 PROCEDURE — 3079F PR MOST RECENT DIASTOLIC BLOOD PRESSURE 80-89 MM HG: ICD-10-PCS | Mod: CPTII,S$GLB,, | Performed by: PODIATRIST

## 2023-01-11 PROCEDURE — 1160F RVW MEDS BY RX/DR IN RCRD: CPT | Mod: CPTII,S$GLB,, | Performed by: PODIATRIST

## 2023-01-11 PROCEDURE — 1159F PR MEDICATION LIST DOCUMENTED IN MEDICAL RECORD: ICD-10-PCS | Mod: CPTII,S$GLB,, | Performed by: PODIATRIST

## 2023-01-11 PROCEDURE — 3077F SYST BP >= 140 MM HG: CPT | Mod: CPTII,S$GLB,, | Performed by: PODIATRIST

## 2023-01-11 PROCEDURE — 99999 PR PBB SHADOW E&M-EST. PATIENT-LVL V: CPT | Mod: PBBFAC,,, | Performed by: PODIATRIST

## 2023-01-11 PROCEDURE — 99214 OFFICE O/P EST MOD 30 MIN: CPT | Mod: S$GLB,,, | Performed by: PODIATRIST

## 2023-01-11 PROCEDURE — 87070 CULTURE OTHR SPECIMN AEROBIC: CPT | Performed by: PODIATRIST

## 2023-01-11 PROCEDURE — 3008F BODY MASS INDEX DOCD: CPT | Mod: CPTII,S$GLB,, | Performed by: PODIATRIST

## 2023-01-11 PROCEDURE — 3077F PR MOST RECENT SYSTOLIC BLOOD PRESSURE >= 140 MM HG: ICD-10-PCS | Mod: CPTII,S$GLB,, | Performed by: PODIATRIST

## 2023-01-11 PROCEDURE — 3008F PR BODY MASS INDEX (BMI) DOCUMENTED: ICD-10-PCS | Mod: CPTII,S$GLB,, | Performed by: PODIATRIST

## 2023-01-11 PROCEDURE — 3079F DIAST BP 80-89 MM HG: CPT | Mod: CPTII,S$GLB,, | Performed by: PODIATRIST

## 2023-01-11 PROCEDURE — 1160F PR REVIEW ALL MEDS BY PRESCRIBER/CLIN PHARMACIST DOCUMENTED: ICD-10-PCS | Mod: CPTII,S$GLB,, | Performed by: PODIATRIST

## 2023-01-12 ENCOUNTER — TELEPHONE (OUTPATIENT)
Dept: PODIATRY | Facility: CLINIC | Age: 63
End: 2023-01-12
Payer: COMMERCIAL

## 2023-01-14 LAB — BACTERIA SPEC AEROBE CULT: NORMAL

## 2023-01-16 NOTE — PROGRESS NOTES
Subjective:       Patient ID: Jerry Islas is a 62 y.o. male.    Chief Complaint: Wound Check    Patient presents today for follow-up multiple areas of skin breakdown and ulceration right secondary to severe digital contractures and Charcot arthropathy.  Follow-up  Associated symptoms include arthralgias and joint swelling.   Foot Pain  Associated symptoms include arthralgias and joint swelling.   Foot Ulcer  Associated symptoms include arthralgias and joint swelling.   Wound Check     Review of Systems   Musculoskeletal:  Positive for arthralgias, gait problem and joint swelling.   Skin:  Positive for color change and wound.   All other systems reviewed and are negative.    Objective:      Physical Exam  Vitals and nursing note reviewed.   Constitutional:       Appearance: Normal appearance. He is well-developed.   Cardiovascular:      Pulses:           Dorsalis pedis pulses are 2+ on the right side and 2+ on the left side.        Posterior tibial pulses are 1+ on the right side and 1+ on the left side.   Pulmonary:      Effort: Pulmonary effort is normal.   Musculoskeletal:         General: Swelling, tenderness and deformity present.      Right ankle: Deformity present. Decreased range of motion.      Right foot: Decreased range of motion. Deformity, Charcot foot and prominent metatarsal heads present.      Left foot: Deformity present.        Feet:    Feet:      Right foot:      Protective Sensation: 4 sites tested.  2 sites sensed.      Skin integrity: Ulcer, skin breakdown, erythema and warmth present.      Left foot:      Protective Sensation: 4 sites tested.  2 sites sensed.   Skin:     General: Skin is warm.      Capillary Refill: Capillary refill takes 2 to 3 seconds.      Findings: Erythema present.   Neurological:      General: No focal deficit present.      Mental Status: He is alert.   Psychiatric:         Behavior: Behavior normal.         Thought Content: Thought content normal.         Judgment:  Judgment normal.                                                           Assessment:       1. Ulcer of right foot with fat layer exposed    2. Type 2 diabetes mellitus without complication, without long-term current use of insulin    3. Comprehensive diabetic foot examination, type 2 DM, encounter for        Plan:        Patient presents today for follow-up multiple areas of skin breakdown and ulceration right secondary to severe digital contractures and Charcot arthropathy.  On evaluation the patient's 2nd digit looks a lot better on the dorsal aspect it is improved considerably still has a lot of breakdown on the medial aspect of the 2nd digit right the lateral aspect looks considerably better.  Patient did finish his Cipro about 4-5 days ago and I did re-culture the area advising the patient will put him on additional antibiotics as appropriate.  While the patient's foot does look a lot better when I went to debride non excisionally the distal aspect of the right great toe a copious amount of purulent drainage emitted from the area this initiated the culture of the area which will dictate the antibiotics appropriate for the patient.  Most recent culture and sensitivity displayed only normal skin bacteria possibly because the patient still had Cipro in his system that he had recently finished I am not going to put the patient on additional antibiotic at this time I did recommend we move the patient's surgery which was scheduled for January 27th because he supposed to have a steroid injection in his neck January 24th I feel that we need to spread some time between the steroid injection and any bone work were going to be doing on the patient's right foot we have move the patient's surgery to February 10, 2023.  I do plan to follow up with the patient in several weeks he is going to continue current wound care plans cleaning all areas with Dakin solution applying Betadine to all open areas to dry them out with  silver alginate and soft 2 by 2s interdigitally to prevent the toes from rubbing and becoming irritated also needs to pad the distal aspect of the right great toe better.  Patient advised to contact us with any problems questions concerns or should his condition or symptoms worsen.  Patient was in understanding and agreement with everything discussed total time including discussion evaluation treatment discussion of treatment plan treatment options wound care and non excisional debridement equaled 30 minutes.  This note was created using [employer] voice recognition software that occasionally misinterpreted phrases or words.

## 2023-01-17 ENCOUNTER — TELEPHONE (OUTPATIENT)
Dept: PODIATRY | Facility: CLINIC | Age: 63
End: 2023-01-17
Payer: COMMERCIAL

## 2023-01-17 NOTE — TELEPHONE ENCOUNTER
Advised patient's wife of culture results. She verbalized understanding.    ----- Message from Gentry Flores DPM sent at 1/15/2023  8:48 PM CST -----  Please call the patient and advise his culture displayed only normal skin bacteria there so there was no need for an additional antibiotic at this time.  ----- Message -----  From: [id number]  Sent: 1/13/2023   1:14 PM CST  To: Gentry Flores DPM

## 2023-01-25 ENCOUNTER — OFFICE VISIT (OUTPATIENT)
Dept: PODIATRY | Facility: CLINIC | Age: 63
End: 2023-01-25
Payer: COMMERCIAL

## 2023-01-25 ENCOUNTER — HOSPITAL ENCOUNTER (OUTPATIENT)
Dept: RADIOLOGY | Facility: HOSPITAL | Age: 63
Discharge: HOME | End: 2023-01-25
Attending: PODIATRIST
Payer: COMMERCIAL

## 2023-01-25 VITALS
BODY MASS INDEX: 29.59 KG/M2 | HEIGHT: 75 IN | DIASTOLIC BLOOD PRESSURE: 86 MMHG | RESPIRATION RATE: 16 BRPM | SYSTOLIC BLOOD PRESSURE: 143 MMHG | WEIGHT: 238 LBS | HEART RATE: 78 BPM

## 2023-01-25 DIAGNOSIS — L97.512 ULCER OF RIGHT FOOT WITH FAT LAYER EXPOSED: Primary | ICD-10-CM

## 2023-01-25 DIAGNOSIS — M20.41 HAMMER TOE OF RIGHT FOOT: ICD-10-CM

## 2023-01-25 DIAGNOSIS — E11.9 COMPREHENSIVE DIABETIC FOOT EXAMINATION, TYPE 2 DM, ENCOUNTER FOR: ICD-10-CM

## 2023-01-25 DIAGNOSIS — E11.9 TYPE 2 DIABETES MELLITUS WITHOUT COMPLICATION, WITHOUT LONG-TERM CURRENT USE OF INSULIN: ICD-10-CM

## 2023-01-25 DIAGNOSIS — L97.512 ULCER OF RIGHT FOOT WITH FAT LAYER EXPOSED: ICD-10-CM

## 2023-01-25 PROCEDURE — 3008F PR BODY MASS INDEX (BMI) DOCUMENTED: ICD-10-PCS | Mod: CPTII,S$GLB,, | Performed by: PODIATRIST

## 2023-01-25 PROCEDURE — 99999 PR PBB SHADOW E&M-EST. PATIENT-LVL IV: CPT | Mod: PBBFAC,,, | Performed by: PODIATRIST

## 2023-01-25 PROCEDURE — 73630 X-RAY EXAM OF FOOT: CPT | Mod: 26,RT,, | Performed by: RADIOLOGY

## 2023-01-25 PROCEDURE — 73630 XR FOOT COMPLETE 3 VIEW RIGHT: ICD-10-PCS | Mod: 26,RT,, | Performed by: RADIOLOGY

## 2023-01-25 PROCEDURE — 1159F MED LIST DOCD IN RCRD: CPT | Mod: CPTII,S$GLB,, | Performed by: PODIATRIST

## 2023-01-25 PROCEDURE — 3077F PR MOST RECENT SYSTOLIC BLOOD PRESSURE >= 140 MM HG: ICD-10-PCS | Mod: CPTII,S$GLB,, | Performed by: PODIATRIST

## 2023-01-25 PROCEDURE — 3079F DIAST BP 80-89 MM HG: CPT | Mod: CPTII,S$GLB,, | Performed by: PODIATRIST

## 2023-01-25 PROCEDURE — 3008F BODY MASS INDEX DOCD: CPT | Mod: CPTII,S$GLB,, | Performed by: PODIATRIST

## 2023-01-25 PROCEDURE — 99999 PR PBB SHADOW E&M-EST. PATIENT-LVL IV: ICD-10-PCS | Mod: PBBFAC,,, | Performed by: PODIATRIST

## 2023-01-25 PROCEDURE — 99214 OFFICE O/P EST MOD 30 MIN: CPT | Mod: S$GLB,,, | Performed by: PODIATRIST

## 2023-01-25 PROCEDURE — 1160F RVW MEDS BY RX/DR IN RCRD: CPT | Mod: CPTII,S$GLB,, | Performed by: PODIATRIST

## 2023-01-25 PROCEDURE — 1159F PR MEDICATION LIST DOCUMENTED IN MEDICAL RECORD: ICD-10-PCS | Mod: CPTII,S$GLB,, | Performed by: PODIATRIST

## 2023-01-25 PROCEDURE — 73630 X-RAY EXAM OF FOOT: CPT | Mod: TC,RT

## 2023-01-25 PROCEDURE — 3079F PR MOST RECENT DIASTOLIC BLOOD PRESSURE 80-89 MM HG: ICD-10-PCS | Mod: CPTII,S$GLB,, | Performed by: PODIATRIST

## 2023-01-25 PROCEDURE — 3077F SYST BP >= 140 MM HG: CPT | Mod: CPTII,S$GLB,, | Performed by: PODIATRIST

## 2023-01-25 PROCEDURE — 99214 PR OFFICE/OUTPT VISIT, EST, LEVL IV, 30-39 MIN: ICD-10-PCS | Mod: S$GLB,,, | Performed by: PODIATRIST

## 2023-01-25 PROCEDURE — 1160F PR REVIEW ALL MEDS BY PRESCRIBER/CLIN PHARMACIST DOCUMENTED: ICD-10-PCS | Mod: CPTII,S$GLB,, | Performed by: PODIATRIST

## 2023-01-25 RX ORDER — SEMAGLUTIDE 2.68 MG/ML
2 INJECTION, SOLUTION SUBCUTANEOUS WEEKLY
COMMUNITY
Start: 2023-01-17

## 2023-01-25 RX ORDER — LIDOCAINE 50 MG/G
1 PATCH TOPICAL DAILY PRN
COMMUNITY
Start: 2023-01-17

## 2023-01-28 NOTE — PROGRESS NOTES
Subjective:       Patient ID: Jerry Islas is a 62 y.o. male.    Chief Complaint: Follow-up, Foot Ulcer, and Diabetes Mellitus    Patient presents today for follow-up multiple areas of skin breakdown and ulceration right secondary to severe digital contractures and Charcot arthropathy.  Follow-up  Associated symptoms include arthralgias and joint swelling.   Foot Pain  Associated symptoms include arthralgias and joint swelling.   Foot Ulcer  Associated symptoms include arthralgias and joint swelling.   Wound Check     Review of Systems   Musculoskeletal:  Positive for arthralgias, gait problem and joint swelling.   Skin:  Positive for color change and wound.   All other systems reviewed and are negative.    Objective:      Physical Exam  Vitals and nursing note reviewed.   Constitutional:       Appearance: Normal appearance. He is well-developed.   Cardiovascular:      Pulses:           Dorsalis pedis pulses are 2+ on the right side and 2+ on the left side.        Posterior tibial pulses are 1+ on the right side and 1+ on the left side.   Pulmonary:      Effort: Pulmonary effort is normal.   Musculoskeletal:         General: Swelling, tenderness and deformity present.      Right ankle: Deformity present. Decreased range of motion.      Right foot: Decreased range of motion. Deformity, Charcot foot and prominent metatarsal heads present.      Left foot: Deformity present.        Feet:    Feet:      Right foot:      Protective Sensation: 4 sites tested.  2 sites sensed.      Skin integrity: Ulcer, skin breakdown, erythema and warmth present.      Left foot:      Protective Sensation: 4 sites tested.  2 sites sensed.   Skin:     General: Skin is warm.      Capillary Refill: Capillary refill takes 2 to 3 seconds.      Findings: Erythema present.   Neurological:      General: No focal deficit present.      Mental Status: He is alert.   Psychiatric:         Behavior: Behavior normal.         Thought Content: Thought  content normal.         Judgment: Judgment normal.                                                                                                             Assessment:       1. Ulcer of right foot with fat layer exposed    2. Hammer toe of right foot    3. Type 2 diabetes mellitus without complication, without long-term current use of insulin    4. Comprehensive diabetic foot examination, type 2 DM, encounter for        Plan:        Patient presents today for follow-up multiple areas of skin breakdown and ulceration right secondary to severe digital contractures and Charcot arthropathy.  On evaluation the patient's 2nd digit looks a lot better on the dorsal aspect it is improved considerably still has a lot of breakdown on the medial aspect of the 2nd digit right the lateral aspect looks considerably better.    While the patient's foot does look a lot better when I went to debride non excisionally the distal aspect of the right great toe.      Patient's right foot area of ulcerations are stable the distal aspect of the right great toe looks better it is not appear infected I am going to discontinue the tobramycin wet-to-dry dressings were going to clean with Dakin's and apply Promogran with silver to this area the previous ulcers of the 2nd digit right are now closed and healed however the patient needs to really pad these areas well and keep them well protected and we certainly do not want any areas of infection leading up to surgery which is scheduled for February 10th.  Patient will be seen for a preoperative evaluation on February 6th any changes problems questions or concerns patient is to contact us immediately wound care provided today patient needs to continue to pad very well interdigitally and try to keep pressure off the tip of the right great toe prior to surgery there can not be any signs of infection.  Patient advised to contact us with any problems questions concerns or should his condition or  symptoms worsen.  Patient was in understanding and agreement with everything discussed total time including discussion evaluation treatment discussion of treatment plan treatment options wound care and non excisional debridement equaled 30 minutes.  This note was created using [employer] voice recognition software that occasionally misinterpreted phrases or words.

## 2023-02-06 ENCOUNTER — OFFICE VISIT (OUTPATIENT)
Dept: PODIATRY | Facility: CLINIC | Age: 63
End: 2023-02-06
Payer: COMMERCIAL

## 2023-02-06 ENCOUNTER — HOSPITAL ENCOUNTER (OUTPATIENT)
Dept: RADIOLOGY | Facility: HOSPITAL | Age: 63
Discharge: HOME | End: 2023-02-06
Attending: PODIATRIST
Payer: COMMERCIAL

## 2023-02-06 ENCOUNTER — TELEPHONE (OUTPATIENT)
Dept: PODIATRY | Facility: CLINIC | Age: 63
End: 2023-02-06

## 2023-02-06 VITALS
DIASTOLIC BLOOD PRESSURE: 79 MMHG | TEMPERATURE: 98 F | RESPIRATION RATE: 16 BRPM | SYSTOLIC BLOOD PRESSURE: 153 MMHG | BODY MASS INDEX: 29.75 KG/M2 | WEIGHT: 238 LBS | HEART RATE: 81 BPM

## 2023-02-06 DIAGNOSIS — Z01.818 PRE-OP EXAM: ICD-10-CM

## 2023-02-06 DIAGNOSIS — M20.41 HAMMER TOE OF RIGHT FOOT: ICD-10-CM

## 2023-02-06 DIAGNOSIS — E11.9 TYPE 2 DIABETES MELLITUS WITHOUT COMPLICATION, WITHOUT LONG-TERM CURRENT USE OF INSULIN: ICD-10-CM

## 2023-02-06 DIAGNOSIS — M20.41 HAMMER TOE OF RIGHT FOOT: Primary | ICD-10-CM

## 2023-02-06 DIAGNOSIS — L97.512 ULCER OF RIGHT FOOT WITH FAT LAYER EXPOSED: ICD-10-CM

## 2023-02-06 DIAGNOSIS — E11.9 COMPREHENSIVE DIABETIC FOOT EXAMINATION, TYPE 2 DM, ENCOUNTER FOR: ICD-10-CM

## 2023-02-06 PROCEDURE — 99215 OFFICE O/P EST HI 40 MIN: CPT | Mod: 57,S$GLB,, | Performed by: PODIATRIST

## 2023-02-06 PROCEDURE — 3078F PR MOST RECENT DIASTOLIC BLOOD PRESSURE < 80 MM HG: ICD-10-PCS | Mod: CPTII,S$GLB,, | Performed by: PODIATRIST

## 2023-02-06 PROCEDURE — 1159F PR MEDICATION LIST DOCUMENTED IN MEDICAL RECORD: ICD-10-PCS | Mod: CPTII,S$GLB,, | Performed by: PODIATRIST

## 2023-02-06 PROCEDURE — 73630 X-RAY EXAM OF FOOT: CPT | Mod: TC,RT

## 2023-02-06 PROCEDURE — 1159F MED LIST DOCD IN RCRD: CPT | Mod: CPTII,S$GLB,, | Performed by: PODIATRIST

## 2023-02-06 PROCEDURE — 3078F DIAST BP <80 MM HG: CPT | Mod: CPTII,S$GLB,, | Performed by: PODIATRIST

## 2023-02-06 PROCEDURE — 3008F BODY MASS INDEX DOCD: CPT | Mod: CPTII,S$GLB,, | Performed by: PODIATRIST

## 2023-02-06 PROCEDURE — 99999 PR PBB SHADOW E&M-EST. PATIENT-LVL V: CPT | Mod: PBBFAC,,, | Performed by: PODIATRIST

## 2023-02-06 PROCEDURE — 99999 PR PBB SHADOW E&M-EST. PATIENT-LVL V: ICD-10-PCS | Mod: PBBFAC,,, | Performed by: PODIATRIST

## 2023-02-06 PROCEDURE — 1160F RVW MEDS BY RX/DR IN RCRD: CPT | Mod: CPTII,S$GLB,, | Performed by: PODIATRIST

## 2023-02-06 PROCEDURE — 3077F PR MOST RECENT SYSTOLIC BLOOD PRESSURE >= 140 MM HG: ICD-10-PCS | Mod: CPTII,S$GLB,, | Performed by: PODIATRIST

## 2023-02-06 PROCEDURE — 73630 X-RAY EXAM OF FOOT: CPT | Mod: 26,RT,, | Performed by: RADIOLOGY

## 2023-02-06 PROCEDURE — 73630 XR FOOT COMPLETE 3 VIEW RIGHT: ICD-10-PCS | Mod: 26,RT,, | Performed by: RADIOLOGY

## 2023-02-06 PROCEDURE — 3077F SYST BP >= 140 MM HG: CPT | Mod: CPTII,S$GLB,, | Performed by: PODIATRIST

## 2023-02-06 PROCEDURE — 99215 PR OFFICE/OUTPT VISIT, EST, LEVL V, 40-54 MIN: ICD-10-PCS | Mod: 57,S$GLB,, | Performed by: PODIATRIST

## 2023-02-06 PROCEDURE — 1160F PR REVIEW ALL MEDS BY PRESCRIBER/CLIN PHARMACIST DOCUMENTED: ICD-10-PCS | Mod: CPTII,S$GLB,, | Performed by: PODIATRIST

## 2023-02-06 PROCEDURE — 3008F PR BODY MASS INDEX (BMI) DOCUMENTED: ICD-10-PCS | Mod: CPTII,S$GLB,, | Performed by: PODIATRIST

## 2023-02-06 RX ORDER — DOXYCYCLINE 100 MG/1
100 CAPSULE ORAL EVERY 12 HOURS
Qty: 28 CAPSULE | Refills: 0 | Status: SHIPPED | OUTPATIENT
Start: 2023-02-06 | End: 2023-02-23 | Stop reason: SDUPTHER

## 2023-02-06 RX ORDER — ONDANSETRON HYDROCHLORIDE 8 MG/1
8 TABLET, FILM COATED ORAL EVERY 8 HOURS PRN
Qty: 42 TABLET | Refills: 1 | Status: SHIPPED | OUTPATIENT
Start: 2023-02-06 | End: 2023-02-20

## 2023-02-06 RX ORDER — HYDROGEN PEROXIDE 3 %
20 SOLUTION, NON-ORAL MISCELLANEOUS
COMMUNITY

## 2023-02-06 RX ORDER — SODIUM CHLORIDE, SODIUM LACTATE, POTASSIUM CHLORIDE, CALCIUM CHLORIDE 600; 310; 30; 20 MG/100ML; MG/100ML; MG/100ML; MG/100ML
INJECTION, SOLUTION INTRAVENOUS CONTINUOUS
Status: CANCELLED | OUTPATIENT
Start: 2023-02-10

## 2023-02-06 RX ORDER — OMEPRAZOLE 10 MG/1
10 CAPSULE, DELAYED RELEASE ORAL DAILY
COMMUNITY

## 2023-02-06 NOTE — TELEPHONE ENCOUNTER
Dr. Ramirez's office stated that you will be prescribing post op meds and they will resume once you feel patient is ready to go back to his regular medication schedule. They request we fax them a letter and Dr. Ramirez will sign off on it. Fax number [phone].

## 2023-02-06 NOTE — PATIENT INSTRUCTIONS
Nothing to eat or drink after midnight.  If you take medication for high blood pressure take this in the morning with a sip of water prior to surgery.     Arrive at out patient registration at 9:30 am

## 2023-02-06 NOTE — H&P (VIEW-ONLY)
Subjective:       Patient ID: Jerry Islas is a 62 y.o. male.    Chief Complaint: Follow-up, Foot Ulcer, Foot Problem, and Diabetes Mellitus    Patient presents today for follow-up multiple areas of skin breakdown and ulceration right secondary to severe digital contractures and Charcot arthropathy.  Patient presents today for further surgical consultation regarding surgical correction digits 1 through 5 right.  Follow-up  Associated symptoms include arthralgias and joint swelling.   Foot Pain  Associated symptoms include arthralgias and joint swelling.   Foot Ulcer  Associated symptoms include arthralgias and joint swelling.   Wound Check     Review of Systems   Musculoskeletal:  Positive for arthralgias, gait problem and joint swelling.   Skin:  Positive for color change and wound.   All other systems reviewed and are negative.    Objective:      Physical Exam  Vitals and nursing note reviewed.   Constitutional:       Appearance: Normal appearance. He is well-developed.   Cardiovascular:      Rate and Rhythm: Normal rate and regular rhythm.      Pulses:           Dorsalis pedis pulses are 2+ on the right side and 2+ on the left side.        Posterior tibial pulses are 1+ on the right side and 1+ on the left side.      Heart sounds: Normal heart sounds.   Pulmonary:      Effort: Pulmonary effort is normal.      Breath sounds: Normal breath sounds.   Musculoskeletal:         General: Swelling, tenderness and deformity present.      Right ankle: Deformity present. Decreased range of motion.      Right foot: Decreased range of motion. Deformity, Charcot foot and prominent metatarsal heads present.      Left foot: Deformity present.        Feet:    Feet:      Right foot:      Protective Sensation: 4 sites tested.  2 sites sensed.      Skin integrity: Ulcer and erythema present.      Left foot:      Protective Sensation: 4 sites tested.  2 sites sensed.   Skin:     General: Skin is warm.      Capillary Refill:  Capillary refill takes 2 to 3 seconds.      Findings: Erythema present.   Neurological:      General: No focal deficit present.      Mental Status: He is alert.   Psychiatric:         Behavior: Behavior normal.         Thought Content: Thought content normal.         Judgment: Judgment normal.                                               Assessment:       1. Hammer toe of right foot    2. Ulcer of right foot with fat layer exposed    3. Type 2 diabetes mellitus without complication, without long-term current use of insulin    4. Comprehensive diabetic foot examination, type 2 DM, encounter for    5. Pre-op exam        Plan:        Patient presents today for follow-up multiple areas of skin breakdown and ulceration right secondary to severe digital contractures and Charcot arthropathy.  O patient presents today for further surgical consultation regarding surgical correction digits 1 through 5 right.  Patient has had ulcerations on the 2nd digit multiple locations and the distal medial aspect of the patient's right hallux.  Patient has continued daily wound care on all of the sites.  All aspects of surgery discussed with the patient patient may decision to pursue surgery as discussed right foot.Patient presents today for preoperative history and physical in discussion all aspects of surgery were discussed with the patient no guarantees were given written or implied all potential complications including but not limited to delayed healing nonhealing postoperative pain infection recurrence were discussed with the patient in detail. All aspect of the patient's recovery time involved in recovery patient responsibility is involving recovery were also discussed in detail. Patient advised failure to comply with postoperative care will jeopardize surgical outcome.  All aspects of surgery were discussed at length and in detail with the patient I did discuss fusion of the distal interphalangeal joint right hallux following  surgical correction and reduction of the deformity as well as an arthrodesis of digits 234 and an arthroplasty of the 5th digit right.  I had initially discussed correction of digits 1 through 4 however the patient has requested to have the 5th digit addressed also which is rigidly contracted.  Patient currently takes Xarelto he had pulmonary embolism about 1 year ago following surgery of the patient's right foot when he was immobilized after having extensive fusion performed I am going to have him hold the Xarelto as of tomorrow he will be able to restart it 24 hours after surgery.  Patient does see a cardiologist however he is only following up with the cardiologist once a year and does not have any immediate cardiac issues.  Wound care was provided today I did debride the ulceration on the distal aspect of the patient's right great toe there is no signs of infection in this area I am going to have the patient start doxycycline now be both before and after surgery because he does have a history of infection in these area ulcer sites and want him taking the doxycycline leading up to surgery.  Patient was dispensed prescriptions for the doxycycline and Zofran he is currently under the care of pain management who has indicated to us that we were okay to prescribe the patient's pain medication postoperatively however were awaiting something in writing from pain management before doing so.  Patient sees Dr. Ramirez for pain management.  Patient was prescribed a knee scooter and a fracture boot to maintain nonweightbearing status right postoperatively I do anticipate complete nonweightbearing for 1-2 weeks after surgery before allowing the patient to bear weight in the fracture boot.  Patient's surgery is scheduled for February 10, 2023 patient has been advised to contact us with any problems questions or concerns prior to surgery preoperative lab work has been ordered and evaluated.  Patient understands the importance of  ice and elevation postoperatively to control postoperative inflammation and understands that he must maintain nonweightbearing status to allow this area to heal properly.  I did review x-rays taken and discussed reduction of the severely contracted deformed digits 1 through 5 right.  Patient understands I will use fixation as necessary to address these deformities.  Patient signed consent agreeing to surgery.  Total face-to-face time including discussion evaluation treatment decision for surgery preoperative history and physical preoperative orders as well as wound care at the time of today's visit equaled 45 minutes.  Patient will continue daily wound care and preparation leading up to the surgery to prevent any areas of additional breakdown or ulceration.  Patient did indicate that the injection that he received in his neck has helped quite a bit and has diminished the numbness he was experiencing substantially.  Patient understands risks involved with this type of surgical procedure especially with his history of previous PE although his mobility and nonweightbearing will be very limited following this procedure.  We have received approval from the patient's pain management provider to provide pain medication postoperatively will advise when the patient is no longer receiving pain medication from us in the patient can return to pain management.  This note was created using [employer] voice recognition software that occasionally misinterpreted phrases or words.

## 2023-02-06 NOTE — PROGRESS NOTES
Subjective:       Patient ID: Jerry Islas is a 62 y.o. male.    Chief Complaint: Follow-up, Foot Ulcer, Foot Problem, and Diabetes Mellitus    Patient presents today for follow-up multiple areas of skin breakdown and ulceration right secondary to severe digital contractures and Charcot arthropathy.  Patient presents today for further surgical consultation regarding surgical correction digits 1 through 5 right.  Follow-up  Associated symptoms include arthralgias and joint swelling.   Foot Pain  Associated symptoms include arthralgias and joint swelling.   Foot Ulcer  Associated symptoms include arthralgias and joint swelling.   Wound Check     Review of Systems   Musculoskeletal:  Positive for arthralgias, gait problem and joint swelling.   Skin:  Positive for color change and wound.   All other systems reviewed and are negative.    Objective:      Physical Exam  Vitals and nursing note reviewed.   Constitutional:       Appearance: Normal appearance. He is well-developed.   Cardiovascular:      Rate and Rhythm: Normal rate and regular rhythm.      Pulses:           Dorsalis pedis pulses are 2+ on the right side and 2+ on the left side.        Posterior tibial pulses are 1+ on the right side and 1+ on the left side.      Heart sounds: Normal heart sounds.   Pulmonary:      Effort: Pulmonary effort is normal.      Breath sounds: Normal breath sounds.   Musculoskeletal:         General: Swelling, tenderness and deformity present.      Right ankle: Deformity present. Decreased range of motion.      Right foot: Decreased range of motion. Deformity, Charcot foot and prominent metatarsal heads present.      Left foot: Deformity present.        Feet:    Feet:      Right foot:      Protective Sensation: 4 sites tested.  2 sites sensed.      Skin integrity: Ulcer and erythema present.      Left foot:      Protective Sensation: 4 sites tested.  2 sites sensed.   Skin:     General: Skin is warm.      Capillary Refill:  Capillary refill takes 2 to 3 seconds.      Findings: Erythema present.   Neurological:      General: No focal deficit present.      Mental Status: He is alert.   Psychiatric:         Behavior: Behavior normal.         Thought Content: Thought content normal.         Judgment: Judgment normal.                                               Assessment:       1. Hammer toe of right foot    2. Ulcer of right foot with fat layer exposed    3. Type 2 diabetes mellitus without complication, without long-term current use of insulin    4. Comprehensive diabetic foot examination, type 2 DM, encounter for    5. Pre-op exam        Plan:        Patient presents today for follow-up multiple areas of skin breakdown and ulceration right secondary to severe digital contractures and Charcot arthropathy.  O patient presents today for further surgical consultation regarding surgical correction digits 1 through 5 right.  Patient has had ulcerations on the 2nd digit multiple locations and the distal medial aspect of the patient's right hallux.  Patient has continued daily wound care on all of the sites.  All aspects of surgery discussed with the patient patient may decision to pursue surgery as discussed right foot.Patient presents today for preoperative history and physical in discussion all aspects of surgery were discussed with the patient no guarantees were given written or implied all potential complications including but not limited to delayed healing nonhealing postoperative pain infection recurrence were discussed with the patient in detail. All aspect of the patient's recovery time involved in recovery patient responsibility is involving recovery were also discussed in detail. Patient advised failure to comply with postoperative care will jeopardize surgical outcome.  All aspects of surgery were discussed at length and in detail with the patient I did discuss fusion of the distal interphalangeal joint right hallux following  surgical correction and reduction of the deformity as well as an arthrodesis of digits 234 and an arthroplasty of the 5th digit right.  I had initially discussed correction of digits 1 through 4 however the patient has requested to have the 5th digit addressed also which is rigidly contracted.  Patient currently takes Xarelto he had pulmonary embolism about 1 year ago following surgery of the patient's right foot when he was immobilized after having extensive fusion performed I am going to have him hold the Xarelto as of tomorrow he will be able to restart it 24 hours after surgery.  Patient does see a cardiologist however he is only following up with the cardiologist once a year and does not have any immediate cardiac issues.  Wound care was provided today I did debride the ulceration on the distal aspect of the patient's right great toe there is no signs of infection in this area I am going to have the patient start doxycycline now be both before and after surgery because he does have a history of infection in these area ulcer sites and want him taking the doxycycline leading up to surgery.  Patient was dispensed prescriptions for the doxycycline and Zofran he is currently under the care of pain management who has indicated to us that we were okay to prescribe the patient's pain medication postoperatively however were awaiting something in writing from pain management before doing so.  Patient sees Dr. Ramirez for pain management.  Patient was prescribed a knee scooter and a fracture boot to maintain nonweightbearing status right postoperatively I do anticipate complete nonweightbearing for 1-2 weeks after surgery before allowing the patient to bear weight in the fracture boot.  Patient's surgery is scheduled for February 10, 2023 patient has been advised to contact us with any problems questions or concerns prior to surgery preoperative lab work has been ordered and evaluated.  Patient understands the importance of  ice and elevation postoperatively to control postoperative inflammation and understands that he must maintain nonweightbearing status to allow this area to heal properly.  I did review x-rays taken and discussed reduction of the severely contracted deformed digits 1 through 5 right.  Patient understands I will use fixation as necessary to address these deformities.  Patient signed consent agreeing to surgery.  Total face-to-face time including discussion evaluation treatment decision for surgery preoperative history and physical preoperative orders as well as wound care at the time of today's visit equaled 45 minutes.  Patient will continue daily wound care and preparation leading up to the surgery to prevent any areas of additional breakdown or ulceration.  Patient did indicate that the injection that he received in his neck has helped quite a bit and has diminished the numbness he was experiencing substantially.  Patient understands risks involved with this type of surgical procedure especially with his history of previous PE although his mobility and nonweightbearing will be very limited following this procedure.  We have received approval from the patient's pain management provider to provide pain medication postoperatively will advise when the patient is no longer receiving pain medication from us in the patient can return to pain management.  This note was created using [employer] voice recognition software that occasionally misinterpreted phrases or words.

## 2023-02-08 ENCOUNTER — TELEPHONE (OUTPATIENT)
Dept: PODIATRY | Facility: CLINIC | Age: 63
End: 2023-02-08
Payer: COMMERCIAL

## 2023-02-08 RX ORDER — OXYCODONE AND ACETAMINOPHEN 10; 325 MG/1; MG/1
1 TABLET ORAL
Qty: 30 TABLET | Refills: 0 | Status: SHIPPED | OUTPATIENT
Start: 2023-02-08 | End: 2023-02-13

## 2023-02-08 RX ORDER — OXYCODONE AND ACETAMINOPHEN 10; 325 MG/1; MG/1
1 TABLET ORAL
Qty: 30 TABLET | Refills: 0 | Status: SHIPPED | OUTPATIENT
Start: 2023-02-08 | End: 2023-02-08

## 2023-02-08 NOTE — TELEPHONE ENCOUNTER
LVM for patient regarding pain management after surgery.----- Message from Gentry Flores DPM sent at 2/8/2023  7:43 AM CST -----  Please call the patient and advise we did receive approval from his pain management provider to go ahead and give him a prescription for pain medication postop which has been sent to his pharmacy this is the only pain medication he should take he should not be taking any of the other medication from his pain management provider.

## 2023-02-08 NOTE — TELEPHONE ENCOUNTER
Confirmed with [facility] that percocet is on back order. Patient would like order to be sent to [facility] on [address] the [facility] store.

## 2023-02-09 ENCOUNTER — LAB VISIT (OUTPATIENT)
Dept: LAB | Facility: HOSPITAL | Age: 63
End: 2023-02-09
Attending: PODIATRIST
Payer: COMMERCIAL

## 2023-02-09 DIAGNOSIS — Z01.818 PRE-OP EXAM: ICD-10-CM

## 2023-02-09 LAB
ALBUMIN SERPL BCP-MCNC: 4 G/DL (ref 3.5–5.2)
ALP SERPL-CCNC: 72 U/L (ref 55–135)
ALT SERPL W/O P-5'-P-CCNC: 20 U/L (ref 10–44)
ANION GAP SERPL CALC-SCNC: 13 MMOL/L (ref 8–16)
AST SERPL-CCNC: 23 U/L (ref 10–40)
BASOPHILS # BLD AUTO: 0.05 K/UL (ref 0–0.2)
BASOPHILS NFR BLD: 0.6 % (ref 0–1.9)
BILIRUB SERPL-MCNC: 1 MG/DL (ref 0.1–1)
BUN SERPL-MCNC: 18 MG/DL (ref 8–23)
CALCIUM SERPL-MCNC: 9.3 MG/DL (ref 8.7–10.5)
CHLORIDE SERPL-SCNC: 99 MMOL/L (ref 95–110)
CO2 SERPL-SCNC: 22 MMOL/L (ref 23–29)
CREAT SERPL-MCNC: 1.3 MG/DL (ref 0.5–1.4)
DIFFERENTIAL METHOD: ABNORMAL
EOSINOPHIL # BLD AUTO: 0.1 K/UL (ref 0–0.5)
EOSINOPHIL NFR BLD: 1.4 % (ref 0–8)
ERYTHROCYTE [DISTWIDTH] IN BLOOD BY AUTOMATED COUNT: 11.6 % (ref 11.5–14.5)
EST. GFR  (NO RACE VARIABLE): >60 ML/MIN/1.73 M^2
GLUCOSE SERPL-MCNC: 137 MG/DL (ref 70–110)
HCT VFR BLD AUTO: 41 % (ref 40–54)
HGB BLD-MCNC: 14.6 G/DL (ref 14–18)
IMM GRANULOCYTES # BLD AUTO: 0.02 K/UL (ref 0–0.04)
IMM GRANULOCYTES NFR BLD AUTO: 0.2 % (ref 0–0.5)
LYMPHOCYTES # BLD AUTO: 2.4 K/UL (ref 1–4.8)
LYMPHOCYTES NFR BLD: 26.7 % (ref 18–48)
MCH RBC QN AUTO: 33.7 PG (ref 27–31)
MCHC RBC AUTO-ENTMCNC: 35.6 G/DL (ref 32–36)
MCV RBC AUTO: 95 FL (ref 82–98)
MONOCYTES # BLD AUTO: 1.1 K/UL (ref 0.3–1)
MONOCYTES NFR BLD: 12.6 % (ref 4–15)
NEUTROPHILS # BLD AUTO: 5.3 K/UL (ref 1.8–7.7)
NEUTROPHILS NFR BLD: 58.5 % (ref 38–73)
NRBC BLD-RTO: 0 /100 WBC
PLATELET # BLD AUTO: 174 K/UL (ref 150–450)
PMV BLD AUTO: 10.1 FL (ref 9.2–12.9)
POTASSIUM SERPL-SCNC: 3.8 MMOL/L (ref 3.5–5.1)
PROT SERPL-MCNC: 6.7 G/DL (ref 6–8.4)
RBC # BLD AUTO: 4.33 M/UL (ref 4.6–6.2)
SODIUM SERPL-SCNC: 134 MMOL/L (ref 136–145)
WBC # BLD AUTO: 9.07 K/UL (ref 3.9–12.7)

## 2023-02-09 PROCEDURE — 36415 COLL VENOUS BLD VENIPUNCTURE: CPT | Performed by: PODIATRIST

## 2023-02-09 PROCEDURE — 85025 COMPLETE CBC W/AUTO DIFF WBC: CPT | Performed by: PODIATRIST

## 2023-02-09 PROCEDURE — 80053 COMPREHEN METABOLIC PANEL: CPT | Performed by: PODIATRIST

## 2023-02-10 ENCOUNTER — ANESTHESIA EVENT (OUTPATIENT)
Dept: SURGERY | Facility: HOSPITAL | Age: 63
End: 2023-02-10
Payer: COMMERCIAL

## 2023-02-10 ENCOUNTER — HOSPITAL ENCOUNTER (OUTPATIENT)
Facility: HOSPITAL | Age: 63
Discharge: HOME | End: 2023-02-10
Attending: PODIATRIST | Admitting: PODIATRIST
Payer: COMMERCIAL

## 2023-02-10 ENCOUNTER — TELEPHONE (OUTPATIENT)
Dept: PODIATRY | Facility: CLINIC | Age: 63
End: 2023-02-10
Payer: COMMERCIAL

## 2023-02-10 ENCOUNTER — ANESTHESIA (OUTPATIENT)
Dept: SURGERY | Facility: HOSPITAL | Age: 63
End: 2023-02-10
Payer: COMMERCIAL

## 2023-02-10 VITALS
DIASTOLIC BLOOD PRESSURE: 71 MMHG | HEART RATE: 76 BPM | RESPIRATION RATE: 12 BRPM | HEIGHT: 75 IN | SYSTOLIC BLOOD PRESSURE: 109 MMHG | OXYGEN SATURATION: 95 % | WEIGHT: 232 LBS | BODY MASS INDEX: 28.85 KG/M2 | TEMPERATURE: 98 F

## 2023-02-10 DIAGNOSIS — Z01.818 PRE-OP EXAM: ICD-10-CM

## 2023-02-10 DIAGNOSIS — M20.41 HAMMERTOE OF RIGHT FOOT: ICD-10-CM

## 2023-02-10 DIAGNOSIS — M20.41 HAMMER TOE OF RIGHT FOOT: Primary | ICD-10-CM

## 2023-02-10 LAB
POCT GLUCOSE: 116 MG/DL (ref 70–110)
POCT GLUCOSE: 138 MG/DL (ref 70–110)

## 2023-02-10 PROCEDURE — 28285 PR REPAIR OF HAMMERTOE,ONE: ICD-10-PCS | Mod: 51,T6,, | Performed by: PODIATRIST

## 2023-02-10 PROCEDURE — 63600175 PHARM REV CODE 636 W HCPCS: Performed by: NURSE ANESTHETIST, CERTIFIED REGISTERED

## 2023-02-10 PROCEDURE — 63600175 PHARM REV CODE 636 W HCPCS: Performed by: PODIATRIST

## 2023-02-10 PROCEDURE — 93010 ELECTROCARDIOGRAM REPORT: CPT | Mod: ,,, | Performed by: INTERNAL MEDICINE

## 2023-02-10 PROCEDURE — D9220A PRA ANESTHESIA: ICD-10-PCS | Mod: CRNA,,, | Performed by: NURSE ANESTHETIST, CERTIFIED REGISTERED

## 2023-02-10 PROCEDURE — D9220A PRA ANESTHESIA: Mod: ANES,,, | Performed by: ANESTHESIOLOGY

## 2023-02-10 PROCEDURE — 25000003 PHARM REV CODE 250: Performed by: PODIATRIST

## 2023-02-10 PROCEDURE — 28285 REPAIR OF HAMMERTOE: CPT | Mod: 51,T7,, | Performed by: PODIATRIST

## 2023-02-10 PROCEDURE — C1769 GUIDE WIRE: HCPCS | Performed by: PODIATRIST

## 2023-02-10 PROCEDURE — D9220A PRA ANESTHESIA: Mod: CRNA,,, | Performed by: NURSE ANESTHETIST, CERTIFIED REGISTERED

## 2023-02-10 PROCEDURE — 37000009 HC ANESTHESIA EA ADD 15 MINS: Performed by: PODIATRIST

## 2023-02-10 PROCEDURE — C1713 ANCHOR/SCREW BN/BN,TIS/BN: HCPCS | Performed by: PODIATRIST

## 2023-02-10 PROCEDURE — 25000003 PHARM REV CODE 250: Performed by: NURSE ANESTHETIST, CERTIFIED REGISTERED

## 2023-02-10 PROCEDURE — 36000707: Performed by: PODIATRIST

## 2023-02-10 PROCEDURE — 71000033 HC RECOVERY, INTIAL HOUR: Performed by: PODIATRIST

## 2023-02-10 PROCEDURE — D9220A PRA ANESTHESIA: ICD-10-PCS | Mod: ANES,,, | Performed by: ANESTHESIOLOGY

## 2023-02-10 PROCEDURE — 93010 EKG 12-LEAD: ICD-10-PCS | Mod: ,,, | Performed by: INTERNAL MEDICINE

## 2023-02-10 PROCEDURE — 36000706: Performed by: PODIATRIST

## 2023-02-10 PROCEDURE — 93005 ELECTROCARDIOGRAM TRACING: CPT

## 2023-02-10 PROCEDURE — 71000015 HC POSTOP RECOV 1ST HR: Performed by: PODIATRIST

## 2023-02-10 PROCEDURE — 27201423 OPTIME MED/SURG SUP & DEVICES STERILE SUPPLY: Performed by: PODIATRIST

## 2023-02-10 PROCEDURE — 37000008 HC ANESTHESIA 1ST 15 MINUTES: Performed by: PODIATRIST

## 2023-02-10 DEVICE — IMPLANTABLE DEVICE: Type: IMPLANTABLE DEVICE | Site: FOOT | Status: FUNCTIONAL

## 2023-02-10 RX ORDER — CEFAZOLIN SODIUM 2 G/50ML
2 SOLUTION INTRAVENOUS
Status: COMPLETED | OUTPATIENT
Start: 2023-02-10 | End: 2023-02-10

## 2023-02-10 RX ORDER — ACETAMINOPHEN 10 MG/ML
INJECTION, SOLUTION INTRAVENOUS
Status: DISCONTINUED | OUTPATIENT
Start: 2023-02-10 | End: 2023-02-10

## 2023-02-10 RX ORDER — PROPOFOL 10 MG/ML
VIAL (ML) INTRAVENOUS
Status: DISCONTINUED | OUTPATIENT
Start: 2023-02-10 | End: 2023-02-10

## 2023-02-10 RX ORDER — DIPHENHYDRAMINE HYDROCHLORIDE 50 MG/ML
12.5 INJECTION INTRAMUSCULAR; INTRAVENOUS
Status: DISCONTINUED | OUTPATIENT
Start: 2023-02-10 | End: 2023-02-10 | Stop reason: HOSPADM

## 2023-02-10 RX ORDER — SODIUM CHLORIDE, SODIUM LACTATE, POTASSIUM CHLORIDE, CALCIUM CHLORIDE 600; 310; 30; 20 MG/100ML; MG/100ML; MG/100ML; MG/100ML
125 INJECTION, SOLUTION INTRAVENOUS CONTINUOUS
Status: DISCONTINUED | OUTPATIENT
Start: 2023-02-10 | End: 2023-02-10 | Stop reason: HOSPADM

## 2023-02-10 RX ORDER — LIDOCAINE HYDROCHLORIDE 10 MG/ML
INJECTION INFILTRATION; PERINEURAL
Status: DISCONTINUED | OUTPATIENT
Start: 2023-02-10 | End: 2023-02-10 | Stop reason: HOSPADM

## 2023-02-10 RX ORDER — PHENYLEPHRINE HYDROCHLORIDE 10 MG/ML
INJECTION INTRAVENOUS
Status: DISCONTINUED | OUTPATIENT
Start: 2023-02-10 | End: 2023-02-10

## 2023-02-10 RX ORDER — LIDOCAINE HYDROCHLORIDE 20 MG/ML
INJECTION, SOLUTION EPIDURAL; INFILTRATION; INTRACAUDAL; PERINEURAL
Status: DISCONTINUED | OUTPATIENT
Start: 2023-02-10 | End: 2023-02-10

## 2023-02-10 RX ORDER — ONDANSETRON 2 MG/ML
INJECTION INTRAMUSCULAR; INTRAVENOUS
Status: DISCONTINUED | OUTPATIENT
Start: 2023-02-10 | End: 2023-02-10

## 2023-02-10 RX ORDER — ONDANSETRON 2 MG/ML
4 INJECTION INTRAMUSCULAR; INTRAVENOUS DAILY PRN
Status: DISCONTINUED | OUTPATIENT
Start: 2023-02-10 | End: 2023-02-10 | Stop reason: HOSPADM

## 2023-02-10 RX ORDER — MORPHINE SULFATE 4 MG/ML
2 INJECTION, SOLUTION INTRAMUSCULAR; INTRAVENOUS EVERY 5 MIN PRN
Status: DISCONTINUED | OUTPATIENT
Start: 2023-02-10 | End: 2023-02-10 | Stop reason: HOSPADM

## 2023-02-10 RX ORDER — LIDOCAINE HYDROCHLORIDE 10 MG/ML
1 INJECTION, SOLUTION EPIDURAL; INFILTRATION; INTRACAUDAL; PERINEURAL ONCE
Status: DISCONTINUED | OUTPATIENT
Start: 2023-02-10 | End: 2023-02-10 | Stop reason: HOSPADM

## 2023-02-10 RX ORDER — MIDAZOLAM HYDROCHLORIDE 1 MG/ML
INJECTION INTRAMUSCULAR; INTRAVENOUS
Status: DISCONTINUED | OUTPATIENT
Start: 2023-02-10 | End: 2023-02-10

## 2023-02-10 RX ORDER — BUPIVACAINE HYDROCHLORIDE 5 MG/ML
INJECTION, SOLUTION PERINEURAL
Status: DISCONTINUED | OUTPATIENT
Start: 2023-02-10 | End: 2023-02-10 | Stop reason: HOSPADM

## 2023-02-10 RX ORDER — DEXAMETHASONE SODIUM PHOSPHATE 4 MG/ML
INJECTION, SOLUTION INTRA-ARTICULAR; INTRALESIONAL; INTRAMUSCULAR; INTRAVENOUS; SOFT TISSUE
Status: DISCONTINUED | OUTPATIENT
Start: 2023-02-10 | End: 2023-02-10

## 2023-02-10 RX ORDER — FENTANYL CITRATE 50 UG/ML
INJECTION, SOLUTION INTRAMUSCULAR; INTRAVENOUS
Status: DISCONTINUED | OUTPATIENT
Start: 2023-02-10 | End: 2023-02-10

## 2023-02-10 RX ORDER — SODIUM CHLORIDE, SODIUM LACTATE, POTASSIUM CHLORIDE, CALCIUM CHLORIDE 600; 310; 30; 20 MG/100ML; MG/100ML; MG/100ML; MG/100ML
INJECTION, SOLUTION INTRAVENOUS CONTINUOUS
Status: DISCONTINUED | OUTPATIENT
Start: 2023-02-10 | End: 2023-02-10 | Stop reason: HOSPADM

## 2023-02-10 RX ORDER — EPHEDRINE SULFATE 50 MG/ML
INJECTION, SOLUTION INTRAVENOUS
Status: DISCONTINUED | OUTPATIENT
Start: 2023-02-10 | End: 2023-02-10

## 2023-02-10 RX ADMIN — PHENYLEPHRINE HYDROCHLORIDE 100 MCG: 10 INJECTION INTRAVENOUS at 11:02

## 2023-02-10 RX ADMIN — LIDOCAINE HYDROCHLORIDE 100 MG: 20 INJECTION, SOLUTION EPIDURAL; INFILTRATION; INTRACAUDAL; PERINEURAL at 11:02

## 2023-02-10 RX ADMIN — FENTANYL CITRATE 25 MCG: 50 INJECTION, SOLUTION INTRAMUSCULAR; INTRAVENOUS at 11:02

## 2023-02-10 RX ADMIN — EPHEDRINE SULFATE 10 MG: 50 INJECTION INTRAVENOUS at 12:02

## 2023-02-10 RX ADMIN — EPHEDRINE SULFATE 15 MG: 50 INJECTION INTRAVENOUS at 11:02

## 2023-02-10 RX ADMIN — PROPOFOL 200 MG: 10 INJECTION, EMULSION INTRAVENOUS at 11:02

## 2023-02-10 RX ADMIN — CEFAZOLIN SODIUM 2 G: 2 SOLUTION INTRAVENOUS at 10:02

## 2023-02-10 RX ADMIN — DEXAMETHASONE SODIUM PHOSPHATE 4 MG: 4 INJECTION, SOLUTION INTRAMUSCULAR; INTRAVENOUS at 11:02

## 2023-02-10 RX ADMIN — PHENYLEPHRINE HYDROCHLORIDE 100 MCG: 10 INJECTION INTRAVENOUS at 12:02

## 2023-02-10 RX ADMIN — ONDANSETRON 4 MG: 2 INJECTION INTRAMUSCULAR; INTRAVENOUS at 11:02

## 2023-02-10 RX ADMIN — ACETAMINOPHEN 1000 MG: 10 INJECTION INTRAVENOUS at 11:02

## 2023-02-10 RX ADMIN — SODIUM CHLORIDE, POTASSIUM CHLORIDE, SODIUM LACTATE AND CALCIUM CHLORIDE: 600; 310; 30; 20 INJECTION, SOLUTION INTRAVENOUS at 10:02

## 2023-02-10 RX ADMIN — SODIUM CHLORIDE, POTASSIUM CHLORIDE, SODIUM LACTATE AND CALCIUM CHLORIDE: 600; 310; 30; 20 INJECTION, SOLUTION INTRAVENOUS at 12:02

## 2023-02-10 RX ADMIN — MIDAZOLAM HYDROCHLORIDE 2 MG: 1 INJECTION, SOLUTION INTRAMUSCULAR; INTRAVENOUS at 10:02

## 2023-02-10 NOTE — ANESTHESIA POSTPROCEDURE EVALUATION
Anesthesia Post Evaluation    Patient: Jerry Islas    Procedure(s) Performed: Procedure(s) (LRB):  CORRECTION, HAMMER TOE (Right)    Final Anesthesia Type: general      Patient location during evaluation: PACU  Patient participation: Yes- Able to Participate  Level of consciousness: awake and alert  Post-procedure vital signs: reviewed and stable  Pain management: adequate  Airway patency: patent    PONV status at discharge: No PONV  Anesthetic complications: no      Cardiovascular status: blood pressure returned to baseline  Respiratory status: unassisted  Hydration status: euvolemic  Follow-up not needed.          Vitals Value Taken Time   BP 97/60 02/10/23 1402   Temp 36.5 °C (97.7 °F) 02/10/23 1338   Pulse 81 02/10/23 1412   Resp 25 02/10/23 1412   SpO2 96 % 02/10/23 1412   Vitals shown include unvalidated device data.      No case tracking events are documented in the log.      Pain/Thony Score: Thony Score: 7 (2/10/2023  1:53 PM)

## 2023-02-10 NOTE — DISCHARGE INSTRUCTIONS

## 2023-02-10 NOTE — ANESTHESIA PREPROCEDURE EVALUATION
02/10/2023  Jerry Islas is a 62 y.o., male.      Pre-op Assessment    I have reviewed the Patient Summary Reports.     I have reviewed the Nursing Notes. I have reviewed the NPO Status.   I have reviewed the Medications.     Review of Systems  Cardiovascular:   Hypertension    Musculoskeletal:   Arthritis     Endocrine:   Diabetes        Physical Exam    Airway:  Mallampati: II   Mouth Opening: Normal  TM Distance: Normal  Tongue: Normal  Neck ROM: Normal ROM    Chest/Lungs:  Clear to auscultation    Heart:  Rate: Normal  Rhythm: Regular Rhythm        Anesthesia Plan  Type of Anesthesia, risks & benefits discussed:    Anesthesia Type: Gen Supraglottic Airway  Intra-op Monitoring Plan: Standard ASA Monitors  Post Op Pain Control Plan: multimodal analgesia  Induction:  IV  ASA Score: 3  Day of Surgery Review of History & Physical: H&P Update referred to the surgeon/provider.    Ready For Surgery From Anesthesia Perspective.     .

## 2023-02-10 NOTE — ANESTHESIA PROCEDURE NOTES
Intubation    Date/Time: 2/10/2023 11:04 AM  Performed by: Asia Wagner CRNA  Authorized by: Nazario Mcduffie MD     Intubation:     Induction:  Intravenous    Intubated:  Postinduction    Mask Ventilation:  Easy mask    Attempts:  1    Attempted By:  CRNA    Difficult Airway Encountered?: No      Complications:  None    Airway Device:  Supraglottic airway/LMA    Airway Device Size:  4.5    Style/Cuff Inflation:  Cuffed (inflated to minimal occlusive pressure)    Secured at:  The lips    Placement Verified By:  Capnometry    Complicating Factors:  None    Findings Post-Intubation:  BS equal bilateral and atraumatic/condition of teeth unchanged

## 2023-02-10 NOTE — OP NOTE
[facility] Surgery  Operative Note     SUMMARY     Surgery Date: 2/10/2023       Pre-op Diagnosis:  Hammer toe of right foot [M20.41]    Post-op Diagnosis:  Post-Op Diagnosis Codes:     * Hammer toe of right foot [M20.41]    Correction hammertoe digits 1 through 5 right foot procedure code 28285 x5  Procedure(s) (LRB):  CORRECTION, HAMMER TOE (Right)    Surgeon(s) and Role:     * Gentry Flores, RADHAM - Primary      Estimated Blood Loss:  less than 5 cc  Hemostasis:  ankle tourniquet placed at 250 mmHg for a period of 1 hour and 52 minutes    Anesthesia:   monitored sedation in conjunction with local infiltrate equaling 30 cc of 1% lidocaine plain   Injectables 30 cc of 0.5% Marcaine plain   Materials sterile irrigant 3.0 Vicryl 4.0 Vicryl 3.0 Monocryl paragon 28 headless compression screws 3.0 x 40 mm  3.0 x 30 mm  2.5 x 34 mm  2.5 x 38 mm  2.5 x 34 mm  2.0 x 28 mm   Pathology none   Condition stable   Complications none    Description of the findings of the procedure:  Hammer toe of right foot [M20.41]         Specimens (From admission, onward)      None             Procedure:   patient was taken the operating room placed in supine position on the OR table attention was then directed towards the patient's right ankle where Webril cast padding was placed on the patient's right ankle as was an ankle tourniquet.  Patient was then locally anesthetized in a regional block of digits 1 through 5 right foot utilizing a total of 30 cc of 1% lidocaine plain.  Following this the patient's foot was prepped and draped in the usual sterile manner patient's foot was then exsanguinated utilizing Esmarch bandage the ankle tourniquet was raised to 250 mmHg.  After having done this the intraoperative fluoroscopy was utilized to plan the incision overlying each of the digits an initial longitudinal linear incision was created overlying the dorsal aspect of the distal interphalangeal joint of the patient's right hallux which was  grossly rigidly contracted all of patient's digits 1 through 5 were rigidly contracted and non reducible.  Upon making the initial incision this was carried down to the level of the distal interphalangeal joint of the right hallux the head of the proximal phalanx was resected utilizing a sagittal saw the articular cartilage on the base of the distal phalanx was resected utilizing a sagittal saw there was extensive degenerative changes noted in this region this allowed for the reduction in proper alignment of the patient's right hallux once proper alignment had been achieved 2 paragon 28 3.0 headless compression screws were placed in a crossing fashion thus fusing the distal interphalangeal joint in a properly aligned position the area was flushed irrigated with copious amounts of sterile saline extensor tendon was trimmed to appropriate length and repaired via simple interrupted sutures of 4.0 Vicryl following this deep closure was achieved with 3.0 Vicryl in a continuous running fashion intermediate closure was achieved with 4.0 Vicryl in a simple interrupted fashion and skin closure was achieved with 3.0 Monocryl in a simple interrupted fashion attention was then directed overlying the dorsal aspect of the 2nd digit right where a longitudinal linear incision was created overlying the dorsal aspect of the 2nd digit this was carried down the level of the extensor tendon which was transected in a medial to lateral fashion reflected off of the head of the proximal phalanx head of proximal phalanx was subsequently resected utilizing a sagittal saw the articular cartilage on the base of the middle phalanx was also resected utilizing a sagittal saw as was the articular cartilage resected at the distal interphalangeal joint of the 2nd digit this allowed for reduction of the 2nd digit properly aligned position as confirmed under intraoperative fluoroscopy patient had gross deformity of the digit and was completely reduced  once properly reduced per manufacture guidelines a paragon 28 headless compression screw was placed across both the distal and proximal interphalangeal joint thus fusing them in a properly aligned position following this the extensor tendon was repaired via simple interrupted sutures of 4.0 Vicryl 3.0 Monocryl was used in a simple interrupted fashion to close the skin layers of the surgical site a tenotomy was performed at the 2nd metatarsophalangeal joint thus releasing the excessively tight extensor tendon this was then sutured closed with 3.0 Monocryl in a simple interrupted fashion the same identical procedure was then performed on the 3rd 4th and 5th digits typically the 5th digit is treated with an arthroplasty versus arthrodesis however due to the severity of the deformity an arthroplasty was not going to appropriately reduce the deformity of the 5th digit so it was deemed necessary to perform an arthrodesis of the 5th digit for proper alignment and put reduction of the deformity especially with the patient's history of diabetes and previous skin breakdown with ulceration of the right foot all of these areas were closed utilizing 3.0 Monocryl in a simple interrupted fashion and the extensor tendons repaired as previously noted patient was then injected with additional 30 cc of 0.5% Marcaine plain to create a long-term postoperative anesthetic.  Ankle tourniquet was lowered no significant bleeding noted Adaptic nonadhesive dressing sterile 4x4s multiple ABD multiple rolls of Kerlix were used to dress the area patient was then placed in a posterior splint where he will maintain complete nonweightbearing status postoperatively.  Patient left the operating room with vital signs stable and vascular status having return to the patient's preoperative levels.  All of the previously noted severe deformities digits 1 through 5 right were completely reduced and placed in properly aligned positioning I did stress to the  patient the need for compliance postoperatively and nonweightbearing.This note was created using [employer] voice recognition software that occasionally misinterpreted phrases or words.

## 2023-02-10 NOTE — PLAN OF CARE
Has met unit/department guidelines for discharge from each phase of the post procedure continuum. Leaving floor per w/c with RN and wife. AAO x3. Resp even and unlabored room air. No distress noted. Tolerating Po fluids without c/o nausea/vomiting. Denies c/o pain or nay other bothersome symptoms. Post-op dsg remains clean, dry and intact. Ice packs x2 sent home with pt. All personal belongings returned to pt.

## 2023-02-10 NOTE — BRIEF OP NOTE
[facility] - Surgery  Brief Operative Note    Surgery Date: 2/10/2023     Surgeon(s) and Role:     * Gentry Flores DPM - Primary    Assisting Surgeon: None    Pre-op Diagnosis:  Hammer toe of right foot [M20.41]    Post-op Diagnosis:  Post-Op Diagnosis Codes:     * Hammer toe of right foot [M20.41]    Procedure(s) (LRB):  CORRECTION, HAMMER TOE (Right)    Anesthesia: Choice    Operative Findings:     Estimated Blood Loss: 10 mL         Specimens:   Specimen (24h ago, onward)      None              Discharge Note    OUTCOME: Patient tolerated treatment/procedure well without complication and is now ready for discharge.    DISPOSITION: Home or Self Care    FINAL DIAGNOSIS:  Hammer toe of right foot    FOLLOWUP: In clinic    DISCHARGE INSTRUCTIONS:    Discharge Procedure Orders   Lifting restrictions     Ice to affected area     Keep surgical extremity elevated     Weight bearing restrictions (specify):

## 2023-02-10 NOTE — TRANSFER OF CARE
"Anesthesia Transfer of Care Note    Patient: Jerry Islas    Procedure(s) Performed: Procedure(s) (LRB):  CORRECTION, HAMMER TOE (Right)    Patient location: PACU    Anesthesia Type: general    Transport from OR: Transported from OR on room air with adequate spontaneous ventilation    Post pain: adequate analgesia    Post assessment: no apparent anesthetic complications and tolerated procedure well    Post vital signs: stable    Level of consciousness: sedated and responds to stimulation    Nausea/Vomiting: no nausea/vomiting    Complications: none    Transfer of care protocol was followed      Last vitals:   Visit Vitals  /75 (BP Location: Right arm, Patient Position: Sitting)   Pulse 77   Temp 36.8 °C (98.2 °F) (Oral)   Resp 18   Ht 6' 3" (1.905 m)   Wt 105.2 kg (232 lb)   SpO2 (!) 94%   BMI 29.00 kg/m²     "

## 2023-02-10 NOTE — TELEPHONE ENCOUNTER
Patient and spouse given post op instructions and verbalized understanding not to put any weight on right foot.

## 2023-02-14 ENCOUNTER — OFFICE VISIT (OUTPATIENT)
Dept: PODIATRY | Facility: CLINIC | Age: 63
End: 2023-02-14
Payer: COMMERCIAL

## 2023-02-14 VITALS
DIASTOLIC BLOOD PRESSURE: 74 MMHG | HEART RATE: 67 BPM | SYSTOLIC BLOOD PRESSURE: 125 MMHG | WEIGHT: 232 LBS | HEIGHT: 75 IN | BODY MASS INDEX: 28.85 KG/M2

## 2023-02-14 DIAGNOSIS — M20.41 HAMMER TOE OF RIGHT FOOT: Primary | ICD-10-CM

## 2023-02-14 DIAGNOSIS — E11.9 COMPREHENSIVE DIABETIC FOOT EXAMINATION, TYPE 2 DM, ENCOUNTER FOR: ICD-10-CM

## 2023-02-14 DIAGNOSIS — E11.9 TYPE 2 DIABETES MELLITUS WITHOUT COMPLICATION, WITHOUT LONG-TERM CURRENT USE OF INSULIN: ICD-10-CM

## 2023-02-14 PROCEDURE — 99024 POSTOP FOLLOW-UP VISIT: CPT | Mod: S$GLB,,, | Performed by: PODIATRIST

## 2023-02-14 PROCEDURE — 1160F RVW MEDS BY RX/DR IN RCRD: CPT | Mod: CPTII,S$GLB,, | Performed by: PODIATRIST

## 2023-02-14 PROCEDURE — 1160F PR REVIEW ALL MEDS BY PRESCRIBER/CLIN PHARMACIST DOCUMENTED: ICD-10-PCS | Mod: CPTII,S$GLB,, | Performed by: PODIATRIST

## 2023-02-14 PROCEDURE — 1159F PR MEDICATION LIST DOCUMENTED IN MEDICAL RECORD: ICD-10-PCS | Mod: CPTII,S$GLB,, | Performed by: PODIATRIST

## 2023-02-14 PROCEDURE — 3008F BODY MASS INDEX DOCD: CPT | Mod: CPTII,S$GLB,, | Performed by: PODIATRIST

## 2023-02-14 PROCEDURE — 29515 APPLICATION SHORT LEG SPLINT: CPT | Mod: 58,RT,S$GLB, | Performed by: PODIATRIST

## 2023-02-14 PROCEDURE — 3008F PR BODY MASS INDEX (BMI) DOCUMENTED: ICD-10-PCS | Mod: CPTII,S$GLB,, | Performed by: PODIATRIST

## 2023-02-14 PROCEDURE — 29515 PR APPLY LOWER LEG SPLINT: ICD-10-PCS | Mod: 58,RT,S$GLB, | Performed by: PODIATRIST

## 2023-02-14 PROCEDURE — 1159F MED LIST DOCD IN RCRD: CPT | Mod: CPTII,S$GLB,, | Performed by: PODIATRIST

## 2023-02-14 PROCEDURE — 3078F PR MOST RECENT DIASTOLIC BLOOD PRESSURE < 80 MM HG: ICD-10-PCS | Mod: CPTII,S$GLB,, | Performed by: PODIATRIST

## 2023-02-14 PROCEDURE — 99024 PR POST-OP FOLLOW-UP VISIT: ICD-10-PCS | Mod: S$GLB,,, | Performed by: PODIATRIST

## 2023-02-14 PROCEDURE — 3074F SYST BP LT 130 MM HG: CPT | Mod: CPTII,S$GLB,, | Performed by: PODIATRIST

## 2023-02-14 PROCEDURE — 99999 PR PBB SHADOW E&M-EST. PATIENT-LVL IV: ICD-10-PCS | Mod: PBBFAC,,, | Performed by: PODIATRIST

## 2023-02-14 PROCEDURE — 3078F DIAST BP <80 MM HG: CPT | Mod: CPTII,S$GLB,, | Performed by: PODIATRIST

## 2023-02-14 PROCEDURE — 3074F PR MOST RECENT SYSTOLIC BLOOD PRESSURE < 130 MM HG: ICD-10-PCS | Mod: CPTII,S$GLB,, | Performed by: PODIATRIST

## 2023-02-14 PROCEDURE — 99999 PR PBB SHADOW E&M-EST. PATIENT-LVL IV: CPT | Mod: PBBFAC,,, | Performed by: PODIATRIST

## 2023-02-17 PROBLEM — M20.42 HAMMER TOE OF LEFT FOOT: Status: RESOLVED | Noted: 2019-01-26 | Resolved: 2023-02-17

## 2023-02-17 PROBLEM — M21.6X2 PLANTARFLEXION DEFORMITY OF LEFT FOOT: Status: RESOLVED | Noted: 2019-05-13 | Resolved: 2023-02-17

## 2023-02-18 NOTE — PROGRESS NOTES
"Jerry Islas is a 62 y.o. male patient.   1. Hammer toe of right foot    2. Type 2 diabetes mellitus without complication, without long-term current use of insulin    3. Comprehensive diabetic foot examination, type 2 DM, encounter for      Past Medical History:   Diagnosis Date    Diabetes mellitus     Hypertension     Other pulmonary embolism without acute cor pulmonale     bilateral pulmonary embolism     No past surgical history pertinent negatives on file.  Scheduled Meds:  Continuous Infusions:  PRN Meds:    Review of patient's allergies indicates:   Allergen Reactions    Lisinopril      Other reaction(s): Angioedema     There are no hospital problems to display for this patient.    Blood pressure 125/74, pulse 67, height 6' 3" (1.905 m), weight 105.2 kg (232 lb).                                          Subjective  Objective   Assessment & Plan     Patient presents status post correction digits 1 through 5 right foot.  Patient states he has been doing well and is maintained in complete nonweightbearing status.  Patient is currently afebrile and in no acute distress he is tolerating his doxycycline he states he did not need to take any of the Zofran and did not have nausea.  Patient has a significant amount of swelling and erythema I advised him he needs to make sure he is icing and elevating this around the clock and he absolutely needs to maintain nonweightbearing status.  A new well-padded thick protective dressing was applied a new posterior splint was applied I plan to see the patient for follow-up in 1 week he is to keep the splint and the dressing dry and intact and contact us with any problems questions or concerns but I did explain to the patient the importance of his ice and elevation.This note was created using [employer] voice recognition software that occasionally misinterpreted phrases or words.   Gentry Flores DPM  2/17/2023    "

## 2023-02-23 ENCOUNTER — OFFICE VISIT (OUTPATIENT)
Dept: PODIATRY | Facility: CLINIC | Age: 63
End: 2023-02-23
Payer: COMMERCIAL

## 2023-02-23 VITALS
SYSTOLIC BLOOD PRESSURE: 127 MMHG | BODY MASS INDEX: 28.84 KG/M2 | HEART RATE: 75 BPM | WEIGHT: 231.94 LBS | HEIGHT: 75 IN | DIASTOLIC BLOOD PRESSURE: 74 MMHG

## 2023-02-23 DIAGNOSIS — E11.9 TYPE 2 DIABETES MELLITUS WITHOUT COMPLICATION, WITHOUT LONG-TERM CURRENT USE OF INSULIN: ICD-10-CM

## 2023-02-23 DIAGNOSIS — E11.9 COMPREHENSIVE DIABETIC FOOT EXAMINATION, TYPE 2 DM, ENCOUNTER FOR: ICD-10-CM

## 2023-02-23 DIAGNOSIS — L97.512 ULCER OF RIGHT FOOT WITH FAT LAYER EXPOSED: Primary | ICD-10-CM

## 2023-02-23 PROCEDURE — 3008F PR BODY MASS INDEX (BMI) DOCUMENTED: ICD-10-PCS | Mod: CPTII,S$GLB,, | Performed by: PODIATRIST

## 2023-02-23 PROCEDURE — 1160F PR REVIEW ALL MEDS BY PRESCRIBER/CLIN PHARMACIST DOCUMENTED: ICD-10-PCS | Mod: CPTII,S$GLB,, | Performed by: PODIATRIST

## 2023-02-23 PROCEDURE — 3078F DIAST BP <80 MM HG: CPT | Mod: CPTII,S$GLB,, | Performed by: PODIATRIST

## 2023-02-23 PROCEDURE — 1159F PR MEDICATION LIST DOCUMENTED IN MEDICAL RECORD: ICD-10-PCS | Mod: CPTII,S$GLB,, | Performed by: PODIATRIST

## 2023-02-23 PROCEDURE — 99024 PR POST-OP FOLLOW-UP VISIT: ICD-10-PCS | Mod: S$GLB,,, | Performed by: PODIATRIST

## 2023-02-23 PROCEDURE — 3008F BODY MASS INDEX DOCD: CPT | Mod: CPTII,S$GLB,, | Performed by: PODIATRIST

## 2023-02-23 PROCEDURE — 1159F MED LIST DOCD IN RCRD: CPT | Mod: CPTII,S$GLB,, | Performed by: PODIATRIST

## 2023-02-23 PROCEDURE — 3074F SYST BP LT 130 MM HG: CPT | Mod: CPTII,S$GLB,, | Performed by: PODIATRIST

## 2023-02-23 PROCEDURE — 99999 PR PBB SHADOW E&M-EST. PATIENT-LVL IV: CPT | Mod: PBBFAC,,, | Performed by: PODIATRIST

## 2023-02-23 PROCEDURE — 99999 PR PBB SHADOW E&M-EST. PATIENT-LVL IV: ICD-10-PCS | Mod: PBBFAC,,, | Performed by: PODIATRIST

## 2023-02-23 PROCEDURE — 1160F RVW MEDS BY RX/DR IN RCRD: CPT | Mod: CPTII,S$GLB,, | Performed by: PODIATRIST

## 2023-02-23 PROCEDURE — 3074F PR MOST RECENT SYSTOLIC BLOOD PRESSURE < 130 MM HG: ICD-10-PCS | Mod: CPTII,S$GLB,, | Performed by: PODIATRIST

## 2023-02-23 PROCEDURE — 29515 APPLICATION SHORT LEG SPLINT: CPT | Mod: 58,RT,S$GLB, | Performed by: PODIATRIST

## 2023-02-23 PROCEDURE — 99024 POSTOP FOLLOW-UP VISIT: CPT | Mod: S$GLB,,, | Performed by: PODIATRIST

## 2023-02-23 PROCEDURE — 3078F PR MOST RECENT DIASTOLIC BLOOD PRESSURE < 80 MM HG: ICD-10-PCS | Mod: CPTII,S$GLB,, | Performed by: PODIATRIST

## 2023-02-23 PROCEDURE — 29515 PR APPLY LOWER LEG SPLINT: ICD-10-PCS | Mod: 58,RT,S$GLB, | Performed by: PODIATRIST

## 2023-02-23 RX ORDER — DOXYCYCLINE 100 MG/1
100 CAPSULE ORAL EVERY 12 HOURS
Qty: 28 CAPSULE | Refills: 0 | Status: SHIPPED | OUTPATIENT
Start: 2023-02-23 | End: 2023-03-09

## 2023-02-26 NOTE — PROGRESS NOTES
"Jerry Islas is a 62 y.o. male patient.   1. Ulcer of right foot with fat layer exposed    2. Type 2 diabetes mellitus without complication, without long-term current use of insulin    3. Comprehensive diabetic foot examination, type 2 DM, encounter for      Past Medical History:   Diagnosis Date    Diabetes mellitus     Hypertension     Other pulmonary embolism without acute cor pulmonale     bilateral pulmonary embolism     No past surgical history pertinent negatives on file.  Scheduled Meds:  Continuous Infusions:  PRN Meds:    Review of patient's allergies indicates:   Allergen Reactions    Lisinopril      Other reaction(s): Angioedema     There are no hospital problems to display for this patient.    Blood pressure 127/74, pulse 75, height 6' 3" (1.905 m), weight 105.2 kg (231 lb 14.8 oz).                                                        Subjective  Objective:  Vital signs (most recent): Blood pressure 127/74, pulse 75, height 6' 3" (1.905 m), weight 105.2 kg (231 lb 14.8 oz).   Assessment & Plan     Patient presents status post correction digits 1 through 5 right foot.  Patient states he has been doing well and is maintained in complete nonweightbearing status.  Upon removal of the patient's dressing the incision on the dorsal 2nd digit right has split open there was heavy drainage from the area the patient's overall swelling is less than previously noted however he still has extensive edema I am going to renew the patient's doxycycline that he is currently on and continue this because of the area that is split open patient states his feet sweat a lot and it is likely the moisture in addition to the swelling which caused the area to split open soft 2 by 2s were placed in all webspaces on the right foot to help with moisture Betadine was applied to the dorsal 2nd digit as well as silver alginate a new well-padded thick protective dressing was applied and a new posterior splint applied patient will " continue nonweightbearing status and be seen for follow-up in 1 week.  This note was created using [employer] voice recognition software that occasionally misinterpreted phrases or words.   Gentry Flores DPM  2/25/2023

## 2023-03-02 ENCOUNTER — OFFICE VISIT (OUTPATIENT)
Dept: PODIATRY | Facility: CLINIC | Age: 63
End: 2023-03-02
Payer: COMMERCIAL

## 2023-03-02 VITALS
BODY MASS INDEX: 28.84 KG/M2 | HEIGHT: 75 IN | SYSTOLIC BLOOD PRESSURE: 136 MMHG | WEIGHT: 231.94 LBS | HEART RATE: 84 BPM | DIASTOLIC BLOOD PRESSURE: 80 MMHG

## 2023-03-02 DIAGNOSIS — M20.41 HAMMER TOE OF RIGHT FOOT: Primary | ICD-10-CM

## 2023-03-02 PROCEDURE — 1160F RVW MEDS BY RX/DR IN RCRD: CPT | Mod: CPTII,S$GLB,, | Performed by: PODIATRIST

## 2023-03-02 PROCEDURE — 99024 POSTOP FOLLOW-UP VISIT: CPT | Mod: S$GLB,,, | Performed by: PODIATRIST

## 2023-03-02 PROCEDURE — 3079F DIAST BP 80-89 MM HG: CPT | Mod: CPTII,S$GLB,, | Performed by: PODIATRIST

## 2023-03-02 PROCEDURE — 99999 PR PBB SHADOW E&M-EST. PATIENT-LVL III: CPT | Mod: PBBFAC,,, | Performed by: PODIATRIST

## 2023-03-02 PROCEDURE — 3075F PR MOST RECENT SYSTOLIC BLOOD PRESS GE 130-139MM HG: ICD-10-PCS | Mod: CPTII,S$GLB,, | Performed by: PODIATRIST

## 2023-03-02 PROCEDURE — 1160F PR REVIEW ALL MEDS BY PRESCRIBER/CLIN PHARMACIST DOCUMENTED: ICD-10-PCS | Mod: CPTII,S$GLB,, | Performed by: PODIATRIST

## 2023-03-02 PROCEDURE — 3008F BODY MASS INDEX DOCD: CPT | Mod: CPTII,S$GLB,, | Performed by: PODIATRIST

## 2023-03-02 PROCEDURE — 1159F MED LIST DOCD IN RCRD: CPT | Mod: CPTII,S$GLB,, | Performed by: PODIATRIST

## 2023-03-02 PROCEDURE — 99024 PR POST-OP FOLLOW-UP VISIT: ICD-10-PCS | Mod: S$GLB,,, | Performed by: PODIATRIST

## 2023-03-02 PROCEDURE — 1159F PR MEDICATION LIST DOCUMENTED IN MEDICAL RECORD: ICD-10-PCS | Mod: CPTII,S$GLB,, | Performed by: PODIATRIST

## 2023-03-02 PROCEDURE — 3075F SYST BP GE 130 - 139MM HG: CPT | Mod: CPTII,S$GLB,, | Performed by: PODIATRIST

## 2023-03-02 PROCEDURE — 3008F PR BODY MASS INDEX (BMI) DOCUMENTED: ICD-10-PCS | Mod: CPTII,S$GLB,, | Performed by: PODIATRIST

## 2023-03-02 PROCEDURE — 3079F PR MOST RECENT DIASTOLIC BLOOD PRESSURE 80-89 MM HG: ICD-10-PCS | Mod: CPTII,S$GLB,, | Performed by: PODIATRIST

## 2023-03-02 PROCEDURE — 99999 PR PBB SHADOW E&M-EST. PATIENT-LVL III: ICD-10-PCS | Mod: PBBFAC,,, | Performed by: PODIATRIST

## 2023-03-05 NOTE — PROGRESS NOTES
"Jerry Islas is a 62 y.o. male patient.   1. Hammer toe of right foot      Past Medical History:   Diagnosis Date    Diabetes mellitus     Hypertension     Other pulmonary embolism without acute cor pulmonale     bilateral pulmonary embolism     No past surgical history pertinent negatives on file.  Scheduled Meds:  Continuous Infusions:  PRN Meds:    Review of patient's allergies indicates:   Allergen Reactions    Lisinopril      Other reaction(s): Angioedema     There are no hospital problems to display for this patient.    Blood pressure 136/80, pulse 84, height 6' 3" (1.905 m), weight 105.2 kg (231 lb 14.8 oz).                                                                                          Subjective  Objective:  Vital signs (most recent): Blood pressure 136/80, pulse 84, height 6' 3" (1.905 m), weight 105.2 kg (231 lb 14.8 oz).   Assessment & Plan     Patient presents status post correction digits 1 through 5 right foot.  Patient states he has been doing well and is maintained in complete nonweightbearing status.  On evaluation the patient is doing a lot better while he still has edema and erythema of the digits with an area of breakdown on the dorsal 2nd digit right it has improved over his last evaluation and I do want him to maintain his continued nonweightbearing status the areas were thoroughly cleaned with Dakin's painted with Betadine gauze were applied interdigitally to prevent rubbing and irritation between the digits patient will finish taking his doxycycline.  Betadine and silver alginate applied to the dorsal 2nd digit right a new well-padded thick protective dressing was applied and the patient was put in a new posterior splint that was applied I plan to follow up with him in 1 week I advised the patient's just not worth trying to put him in a walking boot at this time as these areas could become more swollen more broken down and could lead to complication patient was in complete " agreement with this.  This note was created using [employer] voice recognition software that occasionally misinterpreted phrases or words.   Gentry Flores DPM  3/4/2023

## 2023-03-09 ENCOUNTER — OFFICE VISIT (OUTPATIENT)
Dept: PODIATRY | Facility: CLINIC | Age: 63
End: 2023-03-09
Payer: COMMERCIAL

## 2023-03-09 VITALS
HEART RATE: 67 BPM | DIASTOLIC BLOOD PRESSURE: 83 MMHG | HEIGHT: 75 IN | RESPIRATION RATE: 18 BRPM | BODY MASS INDEX: 28.72 KG/M2 | SYSTOLIC BLOOD PRESSURE: 128 MMHG | WEIGHT: 231 LBS

## 2023-03-09 DIAGNOSIS — L97.512 ULCER OF RIGHT FOOT WITH FAT LAYER EXPOSED: Primary | ICD-10-CM

## 2023-03-09 DIAGNOSIS — E11.9 TYPE 2 DIABETES MELLITUS WITHOUT COMPLICATION, WITHOUT LONG-TERM CURRENT USE OF INSULIN: ICD-10-CM

## 2023-03-09 DIAGNOSIS — E11.9 COMPREHENSIVE DIABETIC FOOT EXAMINATION, TYPE 2 DM, ENCOUNTER FOR: ICD-10-CM

## 2023-03-09 PROCEDURE — 99999 PR PBB SHADOW E&M-EST. PATIENT-LVL IV: ICD-10-PCS | Mod: PBBFAC,,, | Performed by: PODIATRIST

## 2023-03-09 PROCEDURE — 87077 CULTURE AEROBIC IDENTIFY: CPT | Performed by: PODIATRIST

## 2023-03-09 PROCEDURE — 99999 PR PBB SHADOW E&M-EST. PATIENT-LVL IV: CPT | Mod: PBBFAC,,, | Performed by: PODIATRIST

## 2023-03-09 PROCEDURE — 3079F PR MOST RECENT DIASTOLIC BLOOD PRESSURE 80-89 MM HG: ICD-10-PCS | Mod: CPTII,S$GLB,, | Performed by: PODIATRIST

## 2023-03-09 PROCEDURE — 1160F RVW MEDS BY RX/DR IN RCRD: CPT | Mod: CPTII,S$GLB,, | Performed by: PODIATRIST

## 2023-03-09 PROCEDURE — 4010F ACE/ARB THERAPY RXD/TAKEN: CPT | Mod: CPTII,S$GLB,, | Performed by: PODIATRIST

## 2023-03-09 PROCEDURE — 1159F PR MEDICATION LIST DOCUMENTED IN MEDICAL RECORD: ICD-10-PCS | Mod: CPTII,S$GLB,, | Performed by: PODIATRIST

## 2023-03-09 PROCEDURE — 1160F PR REVIEW ALL MEDS BY PRESCRIBER/CLIN PHARMACIST DOCUMENTED: ICD-10-PCS | Mod: CPTII,S$GLB,, | Performed by: PODIATRIST

## 2023-03-09 PROCEDURE — 3074F PR MOST RECENT SYSTOLIC BLOOD PRESSURE < 130 MM HG: ICD-10-PCS | Mod: CPTII,S$GLB,, | Performed by: PODIATRIST

## 2023-03-09 PROCEDURE — 99213 OFFICE O/P EST LOW 20 MIN: CPT | Mod: 24,S$GLB,, | Performed by: PODIATRIST

## 2023-03-09 PROCEDURE — 3079F DIAST BP 80-89 MM HG: CPT | Mod: CPTII,S$GLB,, | Performed by: PODIATRIST

## 2023-03-09 PROCEDURE — 99213 PR OFFICE/OUTPT VISIT, EST, LEVL III, 20-29 MIN: ICD-10-PCS | Mod: 24,S$GLB,, | Performed by: PODIATRIST

## 2023-03-09 PROCEDURE — 87186 SC STD MICRODIL/AGAR DIL: CPT | Performed by: PODIATRIST

## 2023-03-09 PROCEDURE — 3008F PR BODY MASS INDEX (BMI) DOCUMENTED: ICD-10-PCS | Mod: CPTII,S$GLB,, | Performed by: PODIATRIST

## 2023-03-09 PROCEDURE — 3008F BODY MASS INDEX DOCD: CPT | Mod: CPTII,S$GLB,, | Performed by: PODIATRIST

## 2023-03-09 PROCEDURE — 87070 CULTURE OTHR SPECIMN AEROBIC: CPT | Performed by: PODIATRIST

## 2023-03-09 PROCEDURE — 4010F PR ACE/ARB THEARPY RXD/TAKEN: ICD-10-PCS | Mod: CPTII,S$GLB,, | Performed by: PODIATRIST

## 2023-03-09 PROCEDURE — 1159F MED LIST DOCD IN RCRD: CPT | Mod: CPTII,S$GLB,, | Performed by: PODIATRIST

## 2023-03-09 PROCEDURE — 3074F SYST BP LT 130 MM HG: CPT | Mod: CPTII,S$GLB,, | Performed by: PODIATRIST

## 2023-03-09 RX ORDER — HYDROCODONE BITARTRATE AND ACETAMINOPHEN 7.5; 325 MG/1; MG/1
1 TABLET ORAL EVERY 8 HOURS PRN
COMMUNITY
Start: 2023-03-02

## 2023-03-10 NOTE — PROGRESS NOTES
"Jerry Islas is a 62 y.o. male patient.   1. Ulcer of right foot with fat layer exposed    2. Type 2 diabetes mellitus without complication, without long-term current use of insulin    3. Comprehensive diabetic foot examination, type 2 DM, encounter for      Past Medical History:   Diagnosis Date    Diabetes mellitus     Hypertension     Other pulmonary embolism without acute cor pulmonale     bilateral pulmonary embolism     No past surgical history pertinent negatives on file.  Scheduled Meds:  Continuous Infusions:  PRN Meds:    Review of patient's allergies indicates:   Allergen Reactions    Lisinopril      Other reaction(s): Angioedema     There are no hospital problems to display for this patient.    Blood pressure 128/83, pulse 67, resp. rate 18, height 6' 3" (1.905 m), weight 104.8 kg (231 lb).                                                                                                            Subjective  Objective:  Vital signs (most recent): Blood pressure 128/83, pulse 67, resp. rate 18, height 6' 3" (1.905 m), weight 104.8 kg (231 lb).   Assessment & Plan     Patient presents status post correction digits 1 through 5 right foot.  Patient states he has been doing well and is maintained in complete nonweightbearing status.  On evaluation the patient is doing a lot better while he still has edema and erythema of the digits with an area of breakdown on the dorsal 2nd digit right it has improved over his last evaluation.  Patient still has some degree of drainage emitting from the dorsal 2nd digit right I did perform a culture and sensitivity he states he is not sure whether not he is done with his doxycycline because his wife prepares his medications I did culture the area to ensure that there was no active bacterial growth even with the patient's oral doxycycline.  Patient's feet perspire a lot there is a lot of moisture maceration wetness in general surrounding the 1st and 2nd digits therefore I " am going to have the patient start changing the dressing himself every day to combat this he is going to apply Betadine to all webspaces the dorsal aspect of the 2nd digit after cleaning the area with Dakin solution he will apply the Betadine he is going to place silver alginate over the area placed soft 2 by 2s interdigitally and a light dressing on the right foot this is to be changed every day I am going to allow him to go into a fracture boot will discontinue the splint making it much easier for him to change the dressing every day.  Plan follow-up will be 1 week patient is being seen for unrelated evaluation and management due to wound dorsal 2nd digit right not consistent with normal postoperative care following this type of procedure.  Patient was made aware he should not be on his foot too much stay off of it as much as possible wear the boot at all times and advise us with any problems questions or concerns patient will be placed on appropriate antibiotics pending his culture and sensitivity.  This note was created using [employer] voice recognition software that occasionally misinterpreted phrases or words.   Gentry Flores DPM  3/10/2023

## 2023-03-13 ENCOUNTER — TELEPHONE (OUTPATIENT)
Dept: PODIATRY | Facility: CLINIC | Age: 63
End: 2023-03-13
Payer: COMMERCIAL

## 2023-03-13 LAB — BACTERIA SPEC AEROBE CULT: ABNORMAL

## 2023-03-13 RX ORDER — CIPROFLOXACIN 500 MG/1
500 TABLET ORAL 2 TIMES DAILY
Qty: 20 TABLET | Refills: 0 | Status: SHIPPED | OUTPATIENT
Start: 2023-03-13 | End: 2023-03-23

## 2023-03-16 ENCOUNTER — OFFICE VISIT (OUTPATIENT)
Dept: PODIATRY | Facility: CLINIC | Age: 63
End: 2023-03-16
Payer: COMMERCIAL

## 2023-03-16 VITALS
HEART RATE: 70 BPM | BODY MASS INDEX: 28.73 KG/M2 | HEIGHT: 75 IN | SYSTOLIC BLOOD PRESSURE: 133 MMHG | DIASTOLIC BLOOD PRESSURE: 81 MMHG | WEIGHT: 231.06 LBS

## 2023-03-16 DIAGNOSIS — E11.9 COMPREHENSIVE DIABETIC FOOT EXAMINATION, TYPE 2 DM, ENCOUNTER FOR: ICD-10-CM

## 2023-03-16 DIAGNOSIS — E11.9 TYPE 2 DIABETES MELLITUS WITHOUT COMPLICATION, WITHOUT LONG-TERM CURRENT USE OF INSULIN: Primary | ICD-10-CM

## 2023-03-16 PROCEDURE — 3008F PR BODY MASS INDEX (BMI) DOCUMENTED: ICD-10-PCS | Mod: CPTII,S$GLB,, | Performed by: PODIATRIST

## 2023-03-16 PROCEDURE — 3079F DIAST BP 80-89 MM HG: CPT | Mod: CPTII,S$GLB,, | Performed by: PODIATRIST

## 2023-03-16 PROCEDURE — 4010F ACE/ARB THERAPY RXD/TAKEN: CPT | Mod: CPTII,S$GLB,, | Performed by: PODIATRIST

## 2023-03-16 PROCEDURE — 99024 PR POST-OP FOLLOW-UP VISIT: ICD-10-PCS | Mod: S$GLB,,, | Performed by: PODIATRIST

## 2023-03-16 PROCEDURE — 1160F RVW MEDS BY RX/DR IN RCRD: CPT | Mod: CPTII,S$GLB,, | Performed by: PODIATRIST

## 2023-03-16 PROCEDURE — 3075F PR MOST RECENT SYSTOLIC BLOOD PRESS GE 130-139MM HG: ICD-10-PCS | Mod: CPTII,S$GLB,, | Performed by: PODIATRIST

## 2023-03-16 PROCEDURE — 99024 POSTOP FOLLOW-UP VISIT: CPT | Mod: S$GLB,,, | Performed by: PODIATRIST

## 2023-03-16 PROCEDURE — 1159F MED LIST DOCD IN RCRD: CPT | Mod: CPTII,S$GLB,, | Performed by: PODIATRIST

## 2023-03-16 PROCEDURE — 3008F BODY MASS INDEX DOCD: CPT | Mod: CPTII,S$GLB,, | Performed by: PODIATRIST

## 2023-03-16 PROCEDURE — 3075F SYST BP GE 130 - 139MM HG: CPT | Mod: CPTII,S$GLB,, | Performed by: PODIATRIST

## 2023-03-16 PROCEDURE — 1159F PR MEDICATION LIST DOCUMENTED IN MEDICAL RECORD: ICD-10-PCS | Mod: CPTII,S$GLB,, | Performed by: PODIATRIST

## 2023-03-16 PROCEDURE — 3079F PR MOST RECENT DIASTOLIC BLOOD PRESSURE 80-89 MM HG: ICD-10-PCS | Mod: CPTII,S$GLB,, | Performed by: PODIATRIST

## 2023-03-16 PROCEDURE — 99999 PR PBB SHADOW E&M-EST. PATIENT-LVL IV: CPT | Mod: PBBFAC,,, | Performed by: PODIATRIST

## 2023-03-16 PROCEDURE — 99999 PR PBB SHADOW E&M-EST. PATIENT-LVL IV: ICD-10-PCS | Mod: PBBFAC,,, | Performed by: PODIATRIST

## 2023-03-16 PROCEDURE — 4010F PR ACE/ARB THEARPY RXD/TAKEN: ICD-10-PCS | Mod: CPTII,S$GLB,, | Performed by: PODIATRIST

## 2023-03-16 PROCEDURE — 1160F PR REVIEW ALL MEDS BY PRESCRIBER/CLIN PHARMACIST DOCUMENTED: ICD-10-PCS | Mod: CPTII,S$GLB,, | Performed by: PODIATRIST

## 2023-03-19 NOTE — PROGRESS NOTES
"Jerry Islas is a 62 y.o. male patient.   1. Type 2 diabetes mellitus without complication, without long-term current use of insulin    2. Comprehensive diabetic foot examination, type 2 DM, encounter for      Past Medical History:   Diagnosis Date    Diabetes mellitus     Hypertension     Other pulmonary embolism without acute cor pulmonale     bilateral pulmonary embolism     No past surgical history pertinent negatives on file.  Scheduled Meds:  Continuous Infusions:  PRN Meds:    Review of patient's allergies indicates:   Allergen Reactions    Lisinopril      Other reaction(s): Angioedema     There are no hospital problems to display for this patient.    Blood pressure 133/81, pulse 70, height 6' 3" (1.905 m), weight 104.8 kg (231 lb 0.7 oz).                                                                                                                                  Subjective  Objective:  Vital signs (most recent): Blood pressure 133/81, pulse 70, height 6' 3" (1.905 m), weight 104.8 kg (231 lb 0.7 oz).   Assessment & Plan     Patient presents status post correction digits 1 through 5 right foot.  Patient states he has been doing well and is maintained in complete nonweightbearing status.  On evaluation the patient's right foot is looking a lot better the area of breakdown where he had drainage on the dorsal 2nd digit right is now dried up it has a well-formed scab patient is going to go ahead and finish the Cipro he is currently taking I am okay with him starting to bear weight in the fracture boot but he can not overdo it I do recommend continued ice and elevation as necessary will continue daily dressing changes overall he is improved considerably he is doing a lot better I will see him for follow-up in 2 weeks when we can take x-rays and I do anticipate discontinuing the boot the patient will likely be able to start getting the area wet at that time.  Patient has been advised to contact us with any " problems questions or concerns prior to scheduled follow-up but he is healing well and things are improving considerably with antibiotic therapy and wound care.  Patient maintains good anatomic alignment and surgical correction of digits 1 through 5 right.  This note was created using [email or web address] voice recognition software that occasionally misinterpreted phrases or words.   Gentry Flores DPM  3/19/2023

## 2023-03-29 ENCOUNTER — OFFICE VISIT (OUTPATIENT)
Dept: PODIATRY | Facility: CLINIC | Age: 63
End: 2023-03-29
Payer: COMMERCIAL

## 2023-03-29 ENCOUNTER — HOSPITAL ENCOUNTER (OUTPATIENT)
Dept: RADIOLOGY | Facility: HOSPITAL | Age: 63
Discharge: HOME | End: 2023-03-29
Attending: PODIATRIST
Payer: COMMERCIAL

## 2023-03-29 VITALS
HEIGHT: 75 IN | HEART RATE: 71 BPM | DIASTOLIC BLOOD PRESSURE: 82 MMHG | BODY MASS INDEX: 28.72 KG/M2 | SYSTOLIC BLOOD PRESSURE: 143 MMHG | WEIGHT: 231 LBS

## 2023-03-29 DIAGNOSIS — M20.41 HAMMER TOE OF RIGHT FOOT: ICD-10-CM

## 2023-03-29 DIAGNOSIS — M20.41 HAMMER TOE OF RIGHT FOOT: Primary | ICD-10-CM

## 2023-03-29 DIAGNOSIS — E11.9 TYPE 2 DIABETES MELLITUS WITHOUT COMPLICATION, WITHOUT LONG-TERM CURRENT USE OF INSULIN: ICD-10-CM

## 2023-03-29 PROCEDURE — 99024 PR POST-OP FOLLOW-UP VISIT: ICD-10-PCS | Mod: S$GLB,,, | Performed by: PODIATRIST

## 2023-03-29 PROCEDURE — 3008F BODY MASS INDEX DOCD: CPT | Mod: CPTII,S$GLB,, | Performed by: PODIATRIST

## 2023-03-29 PROCEDURE — 1160F RVW MEDS BY RX/DR IN RCRD: CPT | Mod: CPTII,S$GLB,, | Performed by: PODIATRIST

## 2023-03-29 PROCEDURE — 1159F PR MEDICATION LIST DOCUMENTED IN MEDICAL RECORD: ICD-10-PCS | Mod: CPTII,S$GLB,, | Performed by: PODIATRIST

## 2023-03-29 PROCEDURE — 99999 PR PBB SHADOW E&M-EST. PATIENT-LVL IV: ICD-10-PCS | Mod: PBBFAC,,, | Performed by: PODIATRIST

## 2023-03-29 PROCEDURE — 3008F PR BODY MASS INDEX (BMI) DOCUMENTED: ICD-10-PCS | Mod: CPTII,S$GLB,, | Performed by: PODIATRIST

## 2023-03-29 PROCEDURE — 3079F PR MOST RECENT DIASTOLIC BLOOD PRESSURE 80-89 MM HG: ICD-10-PCS | Mod: CPTII,S$GLB,, | Performed by: PODIATRIST

## 2023-03-29 PROCEDURE — 3079F DIAST BP 80-89 MM HG: CPT | Mod: CPTII,S$GLB,, | Performed by: PODIATRIST

## 2023-03-29 PROCEDURE — 99999 PR PBB SHADOW E&M-EST. PATIENT-LVL IV: CPT | Mod: PBBFAC,,, | Performed by: PODIATRIST

## 2023-03-29 PROCEDURE — 4010F ACE/ARB THERAPY RXD/TAKEN: CPT | Mod: CPTII,S$GLB,, | Performed by: PODIATRIST

## 2023-03-29 PROCEDURE — 1160F PR REVIEW ALL MEDS BY PRESCRIBER/CLIN PHARMACIST DOCUMENTED: ICD-10-PCS | Mod: CPTII,S$GLB,, | Performed by: PODIATRIST

## 2023-03-29 PROCEDURE — 73630 XR FOOT COMPLETE 3 VIEW RIGHT: ICD-10-PCS | Mod: 26,RT,, | Performed by: RADIOLOGY

## 2023-03-29 PROCEDURE — 3077F PR MOST RECENT SYSTOLIC BLOOD PRESSURE >= 140 MM HG: ICD-10-PCS | Mod: CPTII,S$GLB,, | Performed by: PODIATRIST

## 2023-03-29 PROCEDURE — 4010F PR ACE/ARB THEARPY RXD/TAKEN: ICD-10-PCS | Mod: CPTII,S$GLB,, | Performed by: PODIATRIST

## 2023-03-29 PROCEDURE — 3077F SYST BP >= 140 MM HG: CPT | Mod: CPTII,S$GLB,, | Performed by: PODIATRIST

## 2023-03-29 PROCEDURE — 99024 POSTOP FOLLOW-UP VISIT: CPT | Mod: S$GLB,,, | Performed by: PODIATRIST

## 2023-03-29 PROCEDURE — 73630 X-RAY EXAM OF FOOT: CPT | Mod: 26,RT,, | Performed by: RADIOLOGY

## 2023-03-29 PROCEDURE — 73630 X-RAY EXAM OF FOOT: CPT | Mod: TC,RT

## 2023-03-29 PROCEDURE — 1159F MED LIST DOCD IN RCRD: CPT | Mod: CPTII,S$GLB,, | Performed by: PODIATRIST

## 2023-03-30 NOTE — PROGRESS NOTES
"Jerry Islas is a 62 y.o. male patient.   1. Hammer toe of right foot    2. Type 2 diabetes mellitus without complication, without long-term current use of insulin      Past Medical History:   Diagnosis Date    Diabetes mellitus     Hypertension     Other pulmonary embolism without acute cor pulmonale     bilateral pulmonary embolism     No past surgical history pertinent negatives on file.  Scheduled Meds:  Continuous Infusions:  PRN Meds:    Review of patient's allergies indicates:   Allergen Reactions    Lisinopril      Other reaction(s): Angioedema     There are no hospital problems to display for this patient.    Blood pressure (!) 143/82, pulse 71, height 6' 3" (1.905 m), weight 104.8 kg (231 lb).                                                                                                                                                Subjective  Objective:  Vital signs (most recent): Blood pressure (!) 143/82, pulse 71, height 6' 3" (1.905 m), weight 104.8 kg (231 lb).   Assessment & Plan     Patient presents status post correction digits 1 through 5 right foot.  Patient is doing very well he is maintained a weight-bearing status with the fracture boot.  Patient has significant reduction in previously noted edema of the patient's right foot I am going to allow him to start getting the area wet however he has to clean the area with Dakin solution afterwards apply Betadine especially overlying the dorsal 2nd digit right which has improved considerably.  X-rays reviewed everything appears to be healing well I am going to allow the patient to transition into a loose fitting shoe as tolerated he understands he is going to have some swelling I plan to follow up with him in 2 weeks.  Patient advised to contact us with any problems questions or concerns prior to scheduled follow-up.  This note was created using [employer] voice recognition software that occasionally misinterpreted phrases or words.   Gentry AL" Sandra, KIRSTIE  3/30/2023

## 2023-04-13 ENCOUNTER — OFFICE VISIT (OUTPATIENT)
Dept: PODIATRY | Facility: CLINIC | Age: 63
End: 2023-04-13
Payer: COMMERCIAL

## 2023-04-13 VITALS
HEART RATE: 67 BPM | DIASTOLIC BLOOD PRESSURE: 76 MMHG | BODY MASS INDEX: 28.73 KG/M2 | SYSTOLIC BLOOD PRESSURE: 127 MMHG | WEIGHT: 231.06 LBS | HEIGHT: 75 IN

## 2023-04-13 DIAGNOSIS — E11.9 TYPE 2 DIABETES MELLITUS WITHOUT COMPLICATION, WITHOUT LONG-TERM CURRENT USE OF INSULIN: ICD-10-CM

## 2023-04-13 DIAGNOSIS — E11.9 COMPREHENSIVE DIABETIC FOOT EXAMINATION, TYPE 2 DM, ENCOUNTER FOR: ICD-10-CM

## 2023-04-13 DIAGNOSIS — R26.89 BALANCE PROBLEM: Primary | ICD-10-CM

## 2023-04-13 DIAGNOSIS — M20.41 HAMMER TOE OF RIGHT FOOT: ICD-10-CM

## 2023-04-13 PROCEDURE — 99024 POSTOP FOLLOW-UP VISIT: CPT | Mod: S$GLB,,, | Performed by: PODIATRIST

## 2023-04-13 PROCEDURE — 4010F ACE/ARB THERAPY RXD/TAKEN: CPT | Mod: CPTII,S$GLB,, | Performed by: PODIATRIST

## 2023-04-13 PROCEDURE — 99024 PR POST-OP FOLLOW-UP VISIT: ICD-10-PCS | Mod: S$GLB,,, | Performed by: PODIATRIST

## 2023-04-13 PROCEDURE — 4010F PR ACE/ARB THEARPY RXD/TAKEN: ICD-10-PCS | Mod: CPTII,S$GLB,, | Performed by: PODIATRIST

## 2023-04-13 PROCEDURE — 1159F MED LIST DOCD IN RCRD: CPT | Mod: CPTII,S$GLB,, | Performed by: PODIATRIST

## 2023-04-13 PROCEDURE — 3288F PR FALLS RISK ASSESSMENT DOCUMENTED: ICD-10-PCS | Mod: CPTII,S$GLB,, | Performed by: PODIATRIST

## 2023-04-13 PROCEDURE — 3074F PR MOST RECENT SYSTOLIC BLOOD PRESSURE < 130 MM HG: ICD-10-PCS | Mod: CPTII,S$GLB,, | Performed by: PODIATRIST

## 2023-04-13 PROCEDURE — 3008F PR BODY MASS INDEX (BMI) DOCUMENTED: ICD-10-PCS | Mod: CPTII,S$GLB,, | Performed by: PODIATRIST

## 2023-04-13 PROCEDURE — 3078F PR MOST RECENT DIASTOLIC BLOOD PRESSURE < 80 MM HG: ICD-10-PCS | Mod: CPTII,S$GLB,, | Performed by: PODIATRIST

## 2023-04-13 PROCEDURE — 1160F PR REVIEW ALL MEDS BY PRESCRIBER/CLIN PHARMACIST DOCUMENTED: ICD-10-PCS | Mod: CPTII,S$GLB,, | Performed by: PODIATRIST

## 2023-04-13 PROCEDURE — 99999 PR PBB SHADOW E&M-EST. PATIENT-LVL V: CPT | Mod: PBBFAC,,, | Performed by: PODIATRIST

## 2023-04-13 PROCEDURE — 3008F BODY MASS INDEX DOCD: CPT | Mod: CPTII,S$GLB,, | Performed by: PODIATRIST

## 2023-04-13 PROCEDURE — 1159F PR MEDICATION LIST DOCUMENTED IN MEDICAL RECORD: ICD-10-PCS | Mod: CPTII,S$GLB,, | Performed by: PODIATRIST

## 2023-04-13 PROCEDURE — 3078F DIAST BP <80 MM HG: CPT | Mod: CPTII,S$GLB,, | Performed by: PODIATRIST

## 2023-04-13 PROCEDURE — 1101F PR PT FALLS ASSESS DOC 0-1 FALLS W/OUT INJ PAST YR: ICD-10-PCS | Mod: CPTII,S$GLB,, | Performed by: PODIATRIST

## 2023-04-13 PROCEDURE — 1160F RVW MEDS BY RX/DR IN RCRD: CPT | Mod: CPTII,S$GLB,, | Performed by: PODIATRIST

## 2023-04-13 PROCEDURE — 3074F SYST BP LT 130 MM HG: CPT | Mod: CPTII,S$GLB,, | Performed by: PODIATRIST

## 2023-04-13 PROCEDURE — 1101F PT FALLS ASSESS-DOCD LE1/YR: CPT | Mod: CPTII,S$GLB,, | Performed by: PODIATRIST

## 2023-04-13 PROCEDURE — 3288F FALL RISK ASSESSMENT DOCD: CPT | Mod: CPTII,S$GLB,, | Performed by: PODIATRIST

## 2023-04-13 PROCEDURE — 99999 PR PBB SHADOW E&M-EST. PATIENT-LVL V: ICD-10-PCS | Mod: PBBFAC,,, | Performed by: PODIATRIST

## 2023-04-13 RX ORDER — OMEGA-3-ACID ETHYL ESTERS 1 G/1
2 CAPSULE, LIQUID FILLED ORAL 2 TIMES DAILY
COMMUNITY

## 2023-04-14 NOTE — PROGRESS NOTES
"Jerry Islas is a 63 y.o. male patient.   1. Balance problem    2. Hammer toe of right foot    3. Type 2 diabetes mellitus without complication, without long-term current use of insulin    4. Comprehensive diabetic foot examination, type 2 DM, encounter for      Past Medical History:   Diagnosis Date    Diabetes mellitus     Hypertension     Other pulmonary embolism without acute cor pulmonale     bilateral pulmonary embolism     No past surgical history pertinent negatives on file.  Scheduled Meds:  Continuous Infusions:  PRN Meds:    Review of patient's allergies indicates:   Allergen Reactions    Lisinopril      Other reaction(s): Angioedema     There are no hospital problems to display for this patient.    Blood pressure 127/76, pulse 67, height 6' 3" (1.905 m), weight 104.8 kg (231 lb 0.7 oz).                                                                                                                                                                      Subjective  Objective:  Vital signs (most recent): Blood pressure 127/76, pulse 67, height 6' 3" (1.905 m), weight 104.8 kg (231 lb 0.7 oz).   Assessment & Plan     Patient presents status post correction digits 1 through 5 right foot.  Remaining scab was removed from the 2nd digit right foot patient states he is doing great he is not having any pain or discomfort all of the surgical sites are completely healed and well resolving he states the inserts I gave him have helped quite a bit he is still having some ankle pain that we will revisit next time I follow up with the patient otherwise I am releasing him following surgery he is done very well everything appears well healed at this time he can gradually increase activity as tolerated.  Patient had questioned me about physical therapy he is having some balance issues related to his neuropathy I do think that physical therapy would be a good idea and have referred the patient to physical therapy.  I did " discuss increasing the patient's gabapentin for the nerve related symptoms he still having will see how he does with therapy and the right ankle pain re-evaluate and treat him accordingly for follow-up.  This note was created using [employer] voice recognition software that occasionally misinterpreted phrases or words.   Gentry Flores DPM  4/14/2023

## 2023-04-17 ENCOUNTER — TELEPHONE (OUTPATIENT)
Dept: PODIATRY | Facility: CLINIC | Age: 63
End: 2023-04-17
Payer: COMMERCIAL

## 2023-04-17 NOTE — TELEPHONE ENCOUNTER
[facility] doesn't take patient's insurance. Patient's spouse will verify with the insurance and will call us back so we can send referral to preferred PT provider.

## 2023-04-17 NOTE — TELEPHONE ENCOUNTER
Patient's wife requested PT referral to [facility]. Advised wife I would send referral over. Sent referral.----- Message from Erma Garcia sent at 4/17/2023 11:04 AM CDT -----  Regarding: referral  Contact: Maite albarado  Type:  Patient Requesting Referral    Who Called:  Maite Islas spouse  Does the patient already have the specialty appointment scheduled?:  no  If yes, what is the date of that appointment?:    Referral to What Specialty:  Physical Therapy  Reason for Referral:  foot surgery  Does the patient want the referral with a specific physician?:  Viviana Durán [phone]  [facility] the [facility]  [facility]  Patient Requesting a Call Back?:  yes  Best Call Back Number:  [phone]  Additional Information:   Please submit a referral for physical therapy.  Please call Maite to advise.  Thanks!

## 2023-04-28 ENCOUNTER — TELEPHONE (OUTPATIENT)
Dept: PODIATRY | Facility: CLINIC | Age: 63
End: 2023-04-28
Payer: COMMERCIAL

## 2023-04-28 NOTE — TELEPHONE ENCOUNTER
Spoke with patient's spouse informing her that we couldn't get the faxed orders to go through. Faxed orders to spouse as requested.

## 2023-05-04 ENCOUNTER — TELEPHONE (OUTPATIENT)
Dept: PODIATRY | Facility: CLINIC | Age: 63
End: 2023-05-04
Payer: COMMERCIAL

## 2023-05-04 NOTE — TELEPHONE ENCOUNTER
Pt. Stated physical therapy would like order to additionally have gait and balance added please.    Fax to [phone]

## 2023-05-04 NOTE — TELEPHONE ENCOUNTER
Contacted [facility] [phone] to give v/o but office closed at 4:30. Will try again tomorrow.     They are using this PT as insurance no longer covers at [facility].

## 2023-05-04 NOTE — TELEPHONE ENCOUNTER
----- Message from Maisha Joshi sent at 5/4/2023  3:30 PM CDT -----  Contact: Patient  Type:  Patient Needs Advice    Who Called:  Patient  What this is regarding?:  Pt needs to have additional things added to his PT orders for his therapy needs.  Best Call Back Number:  [phone]  Additional Information:  Please call the patient's wife, Ms. Arora, back at the phone number listed above to advise. Thank you!

## 2023-05-05 NOTE — TELEPHONE ENCOUNTER
Contacted [facility] [phone] to give v/o was instructed they would have to call me back once message was directed to correct location. Can take up to 24 hours.

## 2023-05-08 ENCOUNTER — TELEPHONE (OUTPATIENT)
Dept: PODIATRY | Facility: CLINIC | Age: 63
End: 2023-05-08
Payer: COMMERCIAL

## 2023-05-11 ENCOUNTER — TELEPHONE (OUTPATIENT)
Dept: PODIATRY | Facility: CLINIC | Age: 63
End: 2023-05-11
Payer: COMMERCIAL

## 2023-05-11 NOTE — TELEPHONE ENCOUNTER
Patient requesting referral for PT to [facility] . Insurance no longer accepted at [facility]. Please advise.

## 2023-05-12 DIAGNOSIS — R26.81 UNSTABLE GAIT: Primary | ICD-10-CM

## 2023-05-12 NOTE — TELEPHONE ENCOUNTER
LVM that order has been faxed and if they do not receive this time we have printed copy that you may  at our office.

## 2023-05-12 NOTE — TELEPHONE ENCOUNTER
Patient stated they took the Dr. Alonso order. If possible to please send new one via fax or they can  so that the balance and gait can be added to the order.

## 2023-05-23 ENCOUNTER — CLINICAL SUPPORT (OUTPATIENT)
Dept: REHABILITATION | Facility: HOSPITAL | Age: 63
End: 2023-05-23
Attending: PODIATRIST
Payer: COMMERCIAL

## 2023-05-23 DIAGNOSIS — R26.81 UNSTEADINESS ON FEET: ICD-10-CM

## 2023-05-23 DIAGNOSIS — R68.89 WORSENING FUNCTIONAL ENDURANCE: ICD-10-CM

## 2023-05-23 DIAGNOSIS — R29.898 WEAKNESS OF EXTREMITY: ICD-10-CM

## 2023-05-23 DIAGNOSIS — R26.81 UNSTABLE GAIT: ICD-10-CM

## 2023-05-23 PROCEDURE — 97110 THERAPEUTIC EXERCISES: CPT | Mod: PN

## 2023-05-23 PROCEDURE — 97161 PT EVAL LOW COMPLEX 20 MIN: CPT | Mod: PN

## 2023-05-23 NOTE — PLAN OF CARE
PT/PTA met face to face to discuss pt's treatment plan and progress towards established goals. Pt will be seen by a physical therapist minimally every 6th visit or every 30 days.    Please see Plan of Care dated 5/23/23 for goals.  -General Balance, strength and Gait training. Pt would like to work on ROM of the Righ ankle as well.  Eliana Arteaga, PT

## 2023-05-23 NOTE — PLAN OF CARE
[facility]  Physical Therapy Initial Evaluation    Name: Jerry Islas  Clinic Number: [id number]    Therapy Diagnosis:   Encounter Diagnoses   Name Primary?    Unstable gait     Weakness of extremity     Unsteadiness on feet     Worsening functional endurance      Physician: Gentry Flores DPM    Physician Orders: PT Eval and Treat   Medical Diagnosis from Referral: R26.81 (ICD-10-CM) - Unstable gait  Evaluation Date: 5/23/2023  Authorization Period Expiration: 5/23/24  Plan of Care Expiration: 8/21/23  Visit # / Visits authorized: 1/ 1  FOTO Visit #:  1/3    Time In: 1:38 PM   Time Out:2:30 PM  Total Appointment Time (timed & untimed codes): 52 minutes    Precautions: Standard    Subjective     History of current condition - Bert reports: Patient presents status post correction digits 1 through 5 right foot in february 2023. Progressive balance issues related to his neuropathy . Per the pt he has had 7 surgeries between both of his feet in the last 3 years.Pt states that he was NON-WB after each surgery with atrophy of both LE. One fall reported approx one year ago. Increased instability in darker atmospheres.     Medical History:   Past Medical History:   Diagnosis Date    Diabetes mellitus     Hypertension     Other pulmonary embolism without acute cor pulmonale     bilateral pulmonary embolism       Surgical History:   Jrery Islas  has a past surgical history that includes Foot surgery; Cholecystectomy; kidney stones; Osteotomy of metatarsal bone (Left, 5/24/2019); Correction of hammer toe (Left, 5/24/2019); Surgical removal of metatarsal head (Left, 5/24/2019); left foot 5/24/19; and Correction of hammer toe (Right, 2/10/2023).    Medications:   Jerry has a current medication list which includes the following prescription(s): cholecalciferol (vitamin d3), esomeprazole, gabapentin, hydrocodone-acetaminophen, lidocaine, losartan, metformin, multivitamin, omega-3 acid  "ethyl esters, omeprazole, ozempic, sildenafil, sildenafil, trazodone, and xarelto.    Allergies:   Review of patient's allergies indicates:   Allergen Reactions    Lisinopril      Other reaction(s): Angioedema        Imaging,   XRAY Right Foot : FINDINGS:  Status post recent resection arthroplasty and ORIF involving the IP joints of the 1st through 5th toes.  No plain film evidence to suggest hardware failure.  There is mild postsurgical soft tissue swelling.     Mild degenerative osteoarthrosis of the 1st MTP joint.  Prior ORIF of the midfoot.  Prior subtalar joint fusion.  These changes are stable over the interval.  Chronic pes planus.     There is bone demineralization.     Impression:     1. Stable postsurgical change from recent resection arthroplasty and ORIF of the 1st through 5th toes.  2. Prior ORIF of the midfoot with subtalar joint fusion unchanged over the interval.  3. Chronic pes planus.    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  Prior Level of Function: Ambulated w/ knee scooter limited distance on and off for the past 3 years  Current Level of Function: pt is walking without AD    Pain:  Current 4/10, worst 8/10, best 3/10   Location: right foot/ankle  Description: Aching, Throbbing  Aggravating Factors: Walking, standing for prolonged periods,turning/twisting on the R le  Easing Factors: Pain medication/Narco    Pts goals: " Walk with a regular gait and reduced pain"    Objective   Observation: ER of the Right foot ; [place] brace donned to R ankle/foot  Posture: Rounded shoulders with FWD head posture    Upper Extremity Strength  RUE 5/5  LUE 5/5  : Adequate/WNL    Lower Extremity Strength  RLE 4+/5  LLE 5/5    Functional mobility:  Gait: Patient ambulating with out  AD with no " assistance for 400 feet with the following gait abnormalities: decreased stride ,decreased clearance R LE  Turns: extra steps on turn around X7    30 SEC STS X4 REPS    Transfers:    Sit to stand: Required support/stabilization to prevent fall  Stairs:  Bilateral rails; Step to pattern    Joint Mobility: Body part Right ANKLE restricted    Palpation: significant tenderness to palpation at Bilateral plantar aspect of feet    Sensation: Altered; Hx of neuropathy     Flexibility:     HAMSTRING  = R moderate restriction, L minimal restriction   Quads : R Moderate restriction, L Moderate   JASSO  BALANCE ASSESSMENT TOOL  1. Sitting to Standing   3 - able to stand independely using hands  2. Standing Unsupported   4 - able to stand safely 2 minutes without hold  3. Sitting Unsupported   4 - able to sit safely and securely 2 minutes  4. Standing to Sitting   3 - controls descent by using hands  5. Pivot Transfer   4 - able to trnasfer safely with minor use of hands  6. Standing with Eyes Closed   1 - unable to keep eyes closed 3 seconds but stands safely  7. Standing with Feet Together   1 - needs help to attain position but able to stand 15 seconds feet together  8. Reaching Forward with Outstretched Arm   2 - can reach forward 5 cm/2 inches safely  9. Retrieving Object from Floor   3 - able to pick slipper but needs supervision  10. Turning to Look Behind   3 - looks behind one side only, other side less weight shift  11. Turning 360 Degrees   2 - able to turn 360 safely but slowly  12. Placing Alternate Foot on Step   1 - able to complete > 2 steps needs min assist  13. Standing with One Foot in Front   1 - need help to step but can hold 15 seconds  14. Standing on One Foot   0 - unable to try or needs assistance to prevent fall    TOTAL SCORE: 40  Maximum: 56  Score:   0-20= high fall risk   21-40 moderate fall risk   41-56 low fall risk     Fall risk cut-off scores:   Elderly and History of falls: <51/56   Elderly and  No history of falls: <42/56   CVA: <45/56    restriction     PT Evaluation Completed? Yes  Discussed Plan of Care with patient: Yes       Limitation/Restriction for FOTO Balance Survey    Therapist reviewed FOTO scores for Jerry Islas on 5/23/2023.   FOTO documents entered into [facility] - see Media section.    Limitation Score: 60/100%         TREATMENT   Treatment Time In: 2:16 pm  Treatment Time Out: 2;30 pm  Total Treatment time (time-based codes) separate from Evaluation: 12 minutes    Bert received the treatments listed below:    THERAPEUTIC EXERCISES to develop strength for 10 minutes including   3 way hip flexion x 10 ea Ryley  Marching x 15  STS x 5  HEP PRINT OUT REVIEWED AND PROVIDED  Home Exercises and Patient Education Provided    Education provided:   - Goals/POC   - Safety completing HEP    Written Home Exercises Provided: yes.  Exercises were reviewed and Bert was able to demonstrate them prior to the end of the session.  Bert demonstrated good  understanding of the education provided.     See EMR under Media for exercises provided 5/23/2023.    Assessment   Jerry is a 63 y.o. male referred to outpatient Physical Therapy with a medical diagnosis of R26.81 (ICD-10-CM) - Unstable gait. Pt presents with Right foot pain, decline in LE strength, activity tolerance and balance.    Pt prognosis is Good.   Pt will benefit from skilled outpatient Physical Therapy to address the deficits stated above and in the chart below, provide pt/family education, and to maximize pt's level of independence.     Plan of care discussed with patient: Yes  Pt's spiritual, cultural and educational needs considered and patient is agreeable to the plan of care and goals as stated below:     Anticipated Barriers for therapy: Chronic impairments related to feet/ankles.    Medical Necessity is demonstrated by the following  History  Co-morbidities and personal factors that may impact the plan of care Co-morbidities:   See Hx      Personal Factors:   {None     low   Examination  Body Structures and Functions, activity limitations and participation restrictions that may impact the plan of care Body Regions:   Lower extremities  General    Body Systems:    Strength  ROM Balance      Participation Restrictions:   None    Activity limitations:   Learning and applying knowledge  no deficits    General Tasks and Commands  no deficits    Communication  no deficits    Mobility  walking    Self care  {No deficits    Domestic Life  {Moderate household activities    Interactions/Relationships  no deficits    Life Areas  no deficits    Community and Social Life  community life  recreation and leisure         Low   Clinical Presentation stable and uncomplicated low   Decision Making/ Complexity Score: low     Goals: Short Term GOALS: 3 weeks. Pt agrees with goals set.  1. Patient demonstrates independence with HEP.   2.  Patient demonstrates independence with body mechanics.      Long Term GOALS: 6 weeks. Pt agrees with goals set.  1. Patient demonstrates increased core strength /POST chain  to improve tolerance to functional activities.   2. Patient demonstrates increased strength BLE's to 5/5 or greater to improve tolerance to functional activities.   3. Patient demonstrates improved overall function per FOTO to 75% OR better.  4. Improve dynamic balance demonstrate on the wilcox to 45 or better indicating low falls risk    Plan   Plan of care Certification: 5/23/2023 to 8/21/23.    Outpatient Physical Therapy 2 times weekly for 6 weeks to include the following interventions: Manual Therapy, Moist Heat/ Ice, Neuromuscular Re-ed, Therapeutic Activities, and Therapeutic Exercise, Gait training, Neuro re-ed. Modalities as appropriate.    Eliana Arteaga, PT

## 2023-05-24 ENCOUNTER — CLINICAL SUPPORT (OUTPATIENT)
Dept: REHABILITATION | Facility: HOSPITAL | Age: 63
End: 2023-05-24
Payer: COMMERCIAL

## 2023-05-24 DIAGNOSIS — R29.898 WEAKNESS OF EXTREMITY: ICD-10-CM

## 2023-05-24 DIAGNOSIS — R68.89 WORSENING FUNCTIONAL ENDURANCE: ICD-10-CM

## 2023-05-24 DIAGNOSIS — R26.81 UNSTEADINESS ON FEET: ICD-10-CM

## 2023-05-24 DIAGNOSIS — R26.81 UNSTABLE GAIT: Primary | ICD-10-CM

## 2023-05-24 PROCEDURE — 97110 THERAPEUTIC EXERCISES: CPT | Mod: PN

## 2023-05-24 PROCEDURE — 97112 NEUROMUSCULAR REEDUCATION: CPT | Mod: PN

## 2023-05-24 NOTE — PROGRESS NOTES
"[facility]   Physical Therapy Treatment Note      Name: Jerry Islas  Clinic Number: [id number]    Therapy Diagnosis:   Encounter Diagnoses   Name Primary?    Unstable gait Yes    Weakness of extremity     Unsteadiness on feet     Worsening functional endurance      Physician: Gentry Flores DPM    Visit Date: 5/24/2023    Physician Orders: PT Eval and Treat   Medical Diagnosis from Referral: R26.81 (ICD-10-CM) - Unstable gait  Evaluation Date: 5/23/2023  Authorization Period Expiration: 5/23/24  Plan of Care Expiration: 8/21/23  Visit # / Visits authorized: 1 / 20 (Not including Initial Evaluation)  FOTO Visit #:  1/3    PTA Visit #: 0/5     Time In: 9:04 AM  Time Out: 9:58 AM  Total Billable Time: 54 minutes    Precautions: Standard precautions; Subtalar ORIF    Subjective     Pt reports: A little tightness/soreness getting up this morning, but not bad. 4/10 pain at the lower back, but that isn't related to this.     He was compliant with home exercise program.  Response to previous treatment: No adverse response reported.  Functional change: None reported    Pain: 0/10; worst 8/10, best 3/10   Location: right foot/ankle  Description: Aching, Throbbing  Aggravating Factors: Walking, standing for prolonged periods,turning/twisting on the R le  Easing Factors: Pain medication/Narco     Pts goals: "Walk with a regular gait and reduced pain"    Objective      Objective Measures updated at progress report unless specified.     Treatment     Bert received the treatments listed below:      Therapeutic Exercises to develop strength, endurance, ROM, flexibility, posture, and core stabilization for 38 minutes including:  Nustep Level 3 x 15minutes; Used theraband to assist in maintaining right foot placement.  Self-mobilization of plantar fascia using lacrosse ball x 2mins  Seated R Tibial internal rotation (excessive ext) Passive 15x, Active 15x  Seated R MTP Flexion 20x  Seated R " Soleus stretch [id number]  Seated Hamstring stretch with foot propped on stool [id number]  Seated R Ankle PF (knee extended), Blue theraband, 20x  Seated R Ankle PF (knee flexed), 5lb DB on knee, 20x  3 way Hip Flexion x 10 ea Ryley  Marching x 15  STS x 5    Neuromuscular Re-Education activities to improve: Balance and Posture for 15 minutes. The following activities were included:  Wide KATYA, Unstable surface, eyes closed, multiple rounds to max duration  Normal KATYA, Unstable surface, eyes open progressing to eyes closed, multiple rounds to max duration  Narrow KATYA, Unstable surface, eyes open progressing to eyes closed, multiple rounds to max duration  Split Stance KATYA (R/L foot fwd each), Unstable surface, eyes open progressing to eyes closed, multiple rounds to max duration  Semi-Tandem KATYA (R/L foot fwd each), Unstable surface, eyes open, multiple rounds to max duration  Semi-Tandem KATYA (R/L foot fwd each), Stable surface, eyes closed, multiple rounds to max duration    Manual Therapy Techniques: were applied to the: Right lower leg for 1 minutes, including:  Right tibial internal rotation mobs and mobs for proximal fibula in anterolateral direction    Therapeutic Activities to improve functional performance for - minutes, including:    Gait Training to improve functional mobility and safety for - minutes, including:    Direct Contact Modalities after being cleared for contraindications:     Supervised Modalities after being cleared for contradictions:     Hot pack for - minutes to -.    Cold pack for - minutes to -.    Patient Education and Home Exercises       Education provided: HEP Review    Written Home Exercises Provided: Patient instructed to cont prior HEP. Exercises were reviewed and Bert was able to demonstrate them prior to the end of the session.  Bert demonstrated good  understanding of the education provided. See EMR under Patient Instructions for exercises provided during therapy sessions    Assessment      Patient demonstrates good tolerance with today's session. Session focused on addressing ROM, flexibility, and standing balance. Tenderness of the plantar fascia decreases as self mobilization continues. Right tibial internal rotation is limited and external rotation is excessive. Ankle PF is limited in strength and range, but is performed without pain. Greater instability and postural sway noted with right foot in rear position during balance activities. At session conclusion, patient denies pain exacerbation.    --    Jerry is a 63 y.o. male referred to outpatient Physical Therapy with a medical diagnosis of R26.81 (ICD-10-CM) - Unstable gait. Pt presents with Right foot pain, decline in LE strength, activity tolerance and balance.     Bert Is progressing well towards his goals.   Pt prognosis is Good.     Pt will continue to benefit from skilled outpatient physical therapy to address the deficits listed in the problem list box on initial evaluation, provide pt/family education and to maximize pt's level of independence in the home and community environment.   Pt's spiritual, cultural and educational needs considered and pt agreeable to plan of care and goals.     Anticipated barriers to physical therapy: None    Goals:   Short Term GOALS: 3 weeks. Pt agrees with goals set.  1. Patient demonstrates independence with HEP.   2.  Patient demonstrates independence with body mechanics.      Long Term GOALS: 6 weeks. Pt agrees with goals set.  1. Patient demonstrates increased core strength /POST chain  to improve tolerance to functional activities.   2. Patient demonstrates increased strength BLE's to 5/5 or greater to improve tolerance to functional activities.   3. Patient demonstrates improved overall function per FOTO to 75% OR better.  4. Improve dynamic balance demonstrate on the wilcox to 45 or better indicating low falls risk    Plan     Advance patient as tolerated.    Plan of care Certification: 5/23/2023  to 8/21/23.     Outpatient Physical Therapy 2 times weekly for 6 weeks to include the following interventions: Manual Therapy, Moist Heat/ Ice, Neuromuscular Re-ed, Therapeutic Activities, and Therapeutic Exercise, Gait training, Neuro re-ed. Modalities as appropriate.    Kandi Arteaga, PT

## 2023-05-29 ENCOUNTER — CLINICAL SUPPORT (OUTPATIENT)
Dept: REHABILITATION | Facility: HOSPITAL | Age: 63
End: 2023-05-29
Attending: PODIATRIST
Payer: COMMERCIAL

## 2023-05-29 DIAGNOSIS — R68.89 WORSENING FUNCTIONAL ENDURANCE: ICD-10-CM

## 2023-05-29 DIAGNOSIS — R26.81 UNSTEADINESS ON FEET: ICD-10-CM

## 2023-05-29 DIAGNOSIS — R29.898 WEAKNESS OF EXTREMITY: Primary | ICD-10-CM

## 2023-05-29 PROCEDURE — 97140 MANUAL THERAPY 1/> REGIONS: CPT | Mod: PN,CQ

## 2023-05-29 PROCEDURE — 97112 NEUROMUSCULAR REEDUCATION: CPT | Mod: PN,CQ

## 2023-05-29 PROCEDURE — 97110 THERAPEUTIC EXERCISES: CPT | Mod: PN,CQ

## 2023-05-29 NOTE — PROGRESS NOTES
"[facility]   Physical Therapy Treatment Note      Name: Jerry Islas  Clinic Number: [id number]    Therapy Diagnosis:   Encounter Diagnoses   Name Primary?    Weakness of extremity Yes    Unsteadiness on feet     Worsening functional endurance      Physician: Gentry Flores, KIRSTIE    Visit Date: 5/29/2023    Physician Orders: PT Eval and Treat   Medical Diagnosis from Referral: R26.81 (ICD-10-CM) - Unstable gait  Evaluation Date: 5/23/2023  Authorization Period Expiration: 5/23/24  Plan of Care Expiration: 8/21/23  Visit # / Visits authorized: 2 / 20 (Not including Initial Evaluation)  FOTO Visit #:  1/3    PTA Visit #: 1/5     Time In: 12:30 pm  Time Out: 1:30 pm  Total Billable Time: 53 minutes    Precautions: Standard precautions; Subtalar ORIF    Subjective     Pt reports: having a terrible Saturday (9/10 LBP).    He was compliant with home exercise program.  Response to previous treatment: ok  Functional change: None reported    Pain: 6/10; worst 8/10, best 3/10   Location: right foot/ankle, Lumbar  Description: Aching, Throbbing  Aggravating Factors: Walking, standing for prolonged periods,turning/twisting on the R le  Easing Factors: Pain medication/Narco     Pts goals: "Walk with a regular gait and reduced pain"    Objective      Objective Measures updated at progress report unless specified.     Treatment     Bert received the treatments listed below:      Therapeutic Exercises to develop strength, endurance, ROM, flexibility, posture, and core stabilization for 38 minutes including:  Nustep Level 3 x 12 minutes; Used theraband to assist in maintaining right foot placement.  Self-mobilization of plantar fascia using lacrosse ball x 2mins  Seated R Tibial internal rotation (excessive ext) Passive 15x, Active 15x  Seated R MTP Flexion 20x  Seated R Soleus stretch [id number]  Seated Hamstring stretch with foot propped on stool [id number]  Seated R Ankle PF (knee extended), Blue " theraband, 20x  Seated R Ankle PF (knee flexed), 5lb DB on knee, 20x  3 way Hip Flexion x 10 ea Ryley  Marching x 15  STS x 5    Neuromuscular Re-Education activities to improve: Balance and Posture for 15 minutes. The following activities were included:  Wide KATYA, Unstable surface, eyes closed, multiple rounds to max duration  Normal KATYA, Unstable surface, eyes open progressing to eyes closed, multiple rounds to max duration  Narrow KATYA, Unstable surface, eyes open progressing to eyes closed, multiple rounds to max duration  Split Stance KATYA (R/L foot fwd each), Unstable surface, eyes open progressing to eyes closed, multiple rounds to max duration  Semi-Tandem KATYA (R/L foot fwd each), Unstable surface, eyes open, multiple rounds to max duration  Semi-Tandem KATYA (R/L foot fwd each), Stable surface, eyes closed, multiple rounds to max duration    Manual Therapy Techniques: were applied to the: Right lower leg for 25 minutes, including:  Myofascial Release   Bilateral psoas   Discussed follow-up releases and possible KT taping of the left tarsal tunnel.    Right tibial internal rotation mobs and mobs for proximal fibula in anterolateral direction    Therapeutic Activities to improve functional performance for - minutes, including:    Gait Training to improve functional mobility and safety for - minutes, including:    Direct Contact Modalities after being cleared for contraindications:     Supervised Modalities after being cleared for contradictions:     Hot pack for - minutes to -.    Cold pack for - minutes to -.    Patient Education and Home Exercises       Education provided: HEP Review    Written Home Exercises Provided: Patient instructed to cont prior HEP. Exercises were reviewed and Bert was able to demonstrate them prior to the end of the session.  Bert demonstrated good  understanding of the education provided. See EMR under Patient Instructions for exercises provided during therapy sessions    Assessment      Patient demonstrates good tolerance with today's session. Session focused on myofascial release and balance. Patient demonstrated better posture and reported less back pain.  Functional improved noted in sit to stand transfers noted.    --    Jerry is a 63 y.o. male referred to outpatient Physical Therapy with a medical diagnosis of R26.81 (ICD-10-CM) - Unstable gait. Pt presents with Right foot pain, decline in LE strength, activity tolerance and balance.     Bert Is progressing well towards his goals.   Pt prognosis is Good.     Pt will continue to benefit from skilled outpatient physical therapy to address the deficits listed in the problem list box on initial evaluation, provide pt/family education and to maximize pt's level of independence in the home and community environment.   Pt's spiritual, cultural and educational needs considered and pt agreeable to plan of care and goals.     Anticipated barriers to physical therapy: None    Goals:   Short Term GOALS: 3 weeks. Pt agrees with goals set.  1. Patient demonstrates independence with HEP.   2.  Patient demonstrates independence with body mechanics.      Long Term GOALS: 6 weeks. Pt agrees with goals set.  1. Patient demonstrates increased core strength /POST chain  to improve tolerance to functional activities.   2. Patient demonstrates increased strength BLE's to 5/5 or greater to improve tolerance to functional activities.   3. Patient demonstrates improved overall function per FOTO to 75% OR better.  4. Improve dynamic balance demonstrate on the wilcox to 45 or better indicating low falls risk    Plan     Advance patient as tolerated.    Plan of care Certification: 5/23/2023 to 8/21/23.     Outpatient Physical Therapy 2 times weekly for 6 weeks to include the following interventions: Manual Therapy, Moist Heat/ Ice, Neuromuscular Re-ed, Therapeutic Activities, and Therapeutic Exercise, Gait training, Neuro re-ed. Modalities as appropriate.    Wu  Muriel, PTA

## 2023-06-05 ENCOUNTER — CLINICAL SUPPORT (OUTPATIENT)
Dept: REHABILITATION | Facility: HOSPITAL | Age: 63
End: 2023-06-05
Attending: PODIATRIST
Payer: COMMERCIAL

## 2023-06-05 DIAGNOSIS — R29.898 WEAKNESS OF EXTREMITY: ICD-10-CM

## 2023-06-05 DIAGNOSIS — R68.89 WORSENING FUNCTIONAL ENDURANCE: ICD-10-CM

## 2023-06-05 DIAGNOSIS — R26.81 UNSTEADINESS ON FEET: Primary | ICD-10-CM

## 2023-06-05 PROCEDURE — 97110 THERAPEUTIC EXERCISES: CPT | Mod: PN,CQ

## 2023-06-05 PROCEDURE — 97140 MANUAL THERAPY 1/> REGIONS: CPT | Mod: PN,CQ

## 2023-06-05 NOTE — PROGRESS NOTES
"[facility]   Physical Therapy Treatment Note      Name: Jerry Islas  Clinic Number: [id number]    Therapy Diagnosis:   Encounter Diagnoses   Name Primary?    Weakness of extremity     Unsteadiness on feet Yes    Worsening functional endurance      Physician: Gentry Flores DPM    Visit Date: 6/5/2023    Physician Orders: PT Eval and Treat   Medical Diagnosis from Referral: R26.81 (ICD-10-CM) - Unstable gait  Evaluation Date: 5/23/2023  Authorization Period Expiration: 5/23/24  Plan of Care Expiration: 8/21/23  Visit # / Visits authorized: 3 / 20 (Not including Initial Evaluation)  FOTO Visit #:  1/3    PTA Visit #: 1/5     Time In: 11:10 am  Time Out: 12:05 pm  Total Billable Time: 53 minutes    Precautions: Standard precautions; Subtalar ORIF    Subjective     Pt reports: feeling better after last treatment.    He was compliant with home exercise program.  Response to previous treatment: ok  Functional change: None reported    Pain: 6/10; worst 8/10, best 3/10   Location: right foot/ankle, Lumbar  Description: Aching, Throbbing  Aggravating Factors: Walking, standing for prolonged periods,turning/twisting on the R le  Easing Factors: Pain medication/Narco     Pts goals: "Walk with a regular gait and reduced pain"    Objective      Objective Measures updated at progress report unless specified.     Treatment     Bert received the treatments listed below:      Therapeutic Exercises to develop strength, endurance, ROM, flexibility, posture, and core stabilization for 38 minutes including:  Nustep Level 3 x 15 minutes; use back NuStep  Self-mobilization of plantar fascia using lacrosse ball x 2mins  Seated R Tibial internal rotation (excessive ext) Passive 15x, Active 15x  Seated R MTP Flexion 20x  Seated R Soleus stretch [id number]  Seated Hamstring stretch with foot propped on stool [id number]  Seated R Ankle PF (knee extended), Blue theraband, 20x  Seated R Ankle PF (knee flexed), " 5lb DB on knee, 20x  3 way Hip Flexion x 10 ea Ryley  Marching x 15  STS x 5    Neuromuscular Re-Education activities to improve: Balance and Posture for 0 minutes. The following activities were included:  Wide KATYA, Unstable surface, eyes closed, multiple rounds to max duration  Normal KATYA, Unstable surface, eyes open progressing to eyes closed, multiple rounds to max duration  Narrow KATYA, Unstable surface, eyes open progressing to eyes closed, multiple rounds to max duration  Split Stance KATYA (R/L foot fwd each), Unstable surface, eyes open progressing to eyes closed, multiple rounds to max duration  Semi-Tandem KATYA (R/L foot fwd each), Unstable surface, eyes open, multiple rounds to max duration  Semi-Tandem KATYA (R/L foot fwd each), Stable surface, eyes closed, multiple rounds to max duration    Manual Therapy Techniques: were applied to the: Right lower leg for 38 minutes, including:  Myofascial Release   Bilateral psoas    Left pec minor   Cranial sacral   KT taping of the left tarsal tunnel.    Right tibial internal rotation mobs and mobs for proximal fibula in anterolateral direction    Therapeutic Activities to improve functional performance for - minutes, including:    Gait Training to improve functional mobility and safety for - minutes, including:    Direct Contact Modalities after being cleared for contraindications:     Supervised Modalities after being cleared for contradictions:     Hot pack for - minutes to -.    Cold pack for - minutes to -.    Patient Education and Home Exercises       Education provided: HEP Review    Written Home Exercises Provided: Patient instructed to cont prior HEP. Exercises were reviewed and Bert was able to demonstrate them prior to the end of the session.  Bert demonstrated good  understanding of the education provided. See EMR under Patient Instructions for exercises provided during therapy sessions    Assessment     Patient demonstrates good tolerance with today's  session. Session focused on myofascial release. Patient demonstrated better posture and reported less back pain.  Functional improved noted in sit to stand transfers noted.    --    Jerry is a 63 y.o. male referred to outpatient Physical Therapy with a medical diagnosis of R26.81 (ICD-10-CM) - Unstable gait. Pt presents with Right foot pain, decline in LE strength, activity tolerance and balance.     Bert Is progressing well towards his goals.   Pt prognosis is Good.     Pt will continue to benefit from skilled outpatient physical therapy to address the deficits listed in the problem list box on initial evaluation, provide pt/family education and to maximize pt's level of independence in the home and community environment.   Pt's spiritual, cultural and educational needs considered and pt agreeable to plan of care and goals.     Anticipated barriers to physical therapy: None    Goals:   Short Term GOALS: 3 weeks. Pt agrees with goals set.  1. Patient demonstrates independence with HEP.   2.  Patient demonstrates independence with body mechanics.      Long Term GOALS: 6 weeks. Pt agrees with goals set.  1. Patient demonstrates increased core strength /POST chain  to improve tolerance to functional activities.   2. Patient demonstrates increased strength BLE's to 5/5 or greater to improve tolerance to functional activities.   3. Patient demonstrates improved overall function per FOTO to 75% OR better.  4. Improve dynamic balance demonstrate on the wilcox to 45 or better indicating low falls risk    Plan     Advance patient as tolerated.    Plan of care Certification: 5/23/2023 to 8/21/23.     Outpatient Physical Therapy 2 times weekly for 6 weeks to include the following interventions: Manual Therapy, Moist Heat/ Ice, Neuromuscular Re-ed, Therapeutic Activities, and Therapeutic Exercise, Gait training, Neuro re-ed. Modalities as appropriate.    Wu Llamas, PTA

## 2023-06-07 ENCOUNTER — CLINICAL SUPPORT (OUTPATIENT)
Dept: REHABILITATION | Facility: HOSPITAL | Age: 63
End: 2023-06-07
Attending: PODIATRIST
Payer: COMMERCIAL

## 2023-06-07 DIAGNOSIS — R26.81 UNSTEADINESS ON FEET: ICD-10-CM

## 2023-06-07 DIAGNOSIS — R68.89 WORSENING FUNCTIONAL ENDURANCE: ICD-10-CM

## 2023-06-07 DIAGNOSIS — R29.898 WEAKNESS OF EXTREMITY: Primary | ICD-10-CM

## 2023-06-07 PROCEDURE — 97110 THERAPEUTIC EXERCISES: CPT | Mod: PN

## 2023-06-07 PROCEDURE — 97112 NEUROMUSCULAR REEDUCATION: CPT | Mod: PN

## 2023-06-07 NOTE — PROGRESS NOTES
"[facility]   Physical Therapy Treatment Note      Name: Jerry Islas  Clinic Number: [id number]    Therapy Diagnosis:   Encounter Diagnoses   Name Primary?    Weakness of extremity Yes    Unsteadiness on feet     Worsening functional endurance      Physician: Gentry Flores, KIRSTIE    Visit Date: 6/7/2023    Physician Orders: PT Eval and Treat   Medical Diagnosis from Referral: R26.81 (ICD-10-CM) - Unstable gait  Evaluation Date: 5/23/2023  Authorization Period Expiration: 5/23/24  Plan of Care Expiration: 8/21/23  Visit # / Visits authorized: 4/ 20 (Not including Initial Evaluation)  FOTO Visit #:  1/3    PTA Visit #: 1/5     Time In: 11:02 am  Time Out: 11:45 am  Total Billable Time: 43 minutes    Precautions: Standard precautions; Subtalar ORIF    Subjective     Pt reports: feeling better after last treatment.    He was compliant with home exercise program.  Response to previous treatment: ok  Functional change: None reported    Pain: 6/10; worst 8/10, best 3/10   Location: right foot/ankle, Lumbar  Description: Aching, Throbbing  Aggravating Factors: Walking, standing for prolonged periods,turning/twisting on the R le  Easing Factors: Pain medication/Narco     Pts goals: "Walk with a regular gait and reduced pain"    Objective      Objective Measures updated at progress report unless specified.     Treatment     Bert received the treatments listed below:      Therapeutic Exercises to develop strength, endurance, ROM, flexibility, posture, and core stabilization for 24 minutes including:  Nustep Level 4 x 15 minutes; use back NuStep  Self-mobilization of plantar fascia using lacrosse ball x 2mins  Seated R Tibial internal rotation (excessive ext) Passive 15x, Active 15x  Seated R MTP Flexion 20x  Seated R Soleus stretch [id number]  Seated Hamstring stretch with foot propped on stool [id number]  Seated R Ankle PF (knee extended), Blue theraband, 20x  Seated R Ankle PF (knee flexed), 5lb " DB on knee, 20x  3 way Hip Flexion x 15 ea Ryley RTB  Marching x 15  STS x 8    Neuromuscular Re-Education activities to improve: Balance and Posture for 19minutes. The following activities were included:  Normal KATYA , Stable surface , eyes closed, multiple rounds with moderate force pertubation's   KATYA, Unstable surface, eyes closed, multiple rounds to max duration  Normal KATYA, Unstable surface, eyes open progressing to eyes closed, multiple rounds to max duration  Narrow KATYA, Unstable surface, eyes open progressing to eyes closed, multiple rounds to max duration  Split Stance KATYA (R/L foot fwd each), Unstable surface, eyes open progressing to eyes closed, multiple rounds to max duration  Semi-Tandem KATYA (R/L foot fwd each), Unstable surface, eyes open, multiple rounds to max duration  Semi-Tandem KATYA (R/L foot fwd each), Stable surface, eyes closed, multiple rounds to max duration    Manual Therapy Techniques: were applied to the: Right lower leg for 38 minutes, including:  Myofascial Release   Bilateral psoas    Left pec minor   Cranial sacral   KT taping of the left tarsal tunnel.    Right tibial internal rotation mobs and mobs for proximal fibula in anterolateral direction    Therapeutic Activities to improve functional performance for - minutes, including:    Gait Training to improve functional mobility and safety for - minutes, including:    Direct Contact Modalities after being cleared for contraindications:     Supervised Modalities after being cleared for contradictions:     Hot pack for - minutes to -.    Cold pack for - minutes to -.    Patient Education and Home Exercises       Education provided: HEP Review    Written Home Exercises Provided: Patient instructed to cont prior HEP. Exercises were reviewed and Bert was able to demonstrate them prior to the end of the session.  Bert demonstrated good  understanding of the education provided. See EMR under Patient Instructions for exercises provided during  therapy sessions    Assessment     Patient demonstrates good tolerance with today's session with focus on balance, endurance and strength. Pt able to demonstrate proper reactive response with pertubation's challenging static standing balance. Session limited 2/2 to request to leave early to attend a zoom call.     --    Jerry is a 63 y.o. male referred to outpatient Physical Therapy with a medical diagnosis of R26.81 (ICD-10-CM) - Unstable gait. Pt presents with Right foot pain, decline in LE strength, activity tolerance and balance.     Bert Is progressing well towards his goals.   Pt prognosis is Good.     Pt will continue to benefit from skilled outpatient physical therapy to address the deficits listed in the problem list box on initial evaluation, provide pt/family education and to maximize pt's level of independence in the home and community environment.   Pt's spiritual, cultural and educational needs considered and pt agreeable to plan of care and goals.     Anticipated barriers to physical therapy: None    Goals:   Short Term GOALS: 3 weeks. Pt agrees with goals set.  1. Patient demonstrates independence with HEP.   2.  Patient demonstrates independence with body mechanics.      Long Term GOALS: 6 weeks. Pt agrees with goals set.  1. Patient demonstrates increased core strength /POST chain  to improve tolerance to functional activities.   2. Patient demonstrates increased strength BLE's to 5/5 or greater to improve tolerance to functional activities.   3. Patient demonstrates improved overall function per FOTO to 75% OR better.  4. Improve dynamic balance demonstrate on the wilcox to 45 or better indicating low falls risk    Plan     Advance patient as tolerated.    Plan of care Certification: 5/23/2023 to 8/21/23.     Outpatient Physical Therapy 2 times weekly for 6 weeks to include the following interventions: Manual Therapy, Moist Heat/ Ice, Neuromuscular Re-ed, Therapeutic Activities, and Therapeutic  Exercise, Gait training, Neuro re-ed. Modalities as appropriate.    Eliana Arteaga, PT

## 2023-06-12 ENCOUNTER — CLINICAL SUPPORT (OUTPATIENT)
Dept: REHABILITATION | Facility: HOSPITAL | Age: 63
End: 2023-06-12
Attending: PODIATRIST
Payer: COMMERCIAL

## 2023-06-12 DIAGNOSIS — R26.81 UNSTEADINESS ON FEET: ICD-10-CM

## 2023-06-12 DIAGNOSIS — R68.89 WORSENING FUNCTIONAL ENDURANCE: ICD-10-CM

## 2023-06-12 DIAGNOSIS — R29.898 WEAKNESS OF EXTREMITY: Primary | ICD-10-CM

## 2023-06-12 PROCEDURE — 97112 NEUROMUSCULAR REEDUCATION: CPT | Mod: PN,CQ

## 2023-06-12 PROCEDURE — 97110 THERAPEUTIC EXERCISES: CPT | Mod: PN,CQ

## 2023-06-12 PROCEDURE — 97140 MANUAL THERAPY 1/> REGIONS: CPT | Mod: PN,CQ

## 2023-06-12 NOTE — PROGRESS NOTES
"[facility]   Physical Therapy Treatment Note      Name: Jerry Islas  Clinic Number: [id number]    Therapy Diagnosis:   Encounter Diagnoses   Name Primary?    Weakness of extremity Yes    Unsteadiness on feet     Worsening functional endurance      Physician: Gentry Flores DPM    Visit Date: 6/12/2023    Physician Orders: PT Eval and Treat   Medical Diagnosis from Referral: R26.81 (ICD-10-CM) - Unstable gait  Evaluation Date: 5/23/2023  Authorization Period Expiration: 5/23/24  Plan of Care Expiration: 8/21/23  Visit # / Visits authorized: 5/ 20 (Not including Initial Evaluation)  FOTO Visit #:  1/3    PTA Visit #: 1/5     Time In: 10:56 am  Time Out: 11:50 am  Total Billable Time: 53 minutes    Precautions: Standard precautions; Subtalar ORIF    Subjective     Pt reports: that he has been feeling better, so he did some work around the house and it lit up his left leg sciatica.      He was compliant with home exercise program.  Response to previous treatment: ok  Functional change: None reported    Pain: 7/10; worst 8/10, best 3/10   Location: right foot/ankle, Lumbar  Description: Aching, Throbbing  Aggravating Factors: Walking, standing for prolonged periods,turning/twisting on the R le  Easing Factors: Pain medication/Narco     Pts goals: "Walk with a regular gait and reduced pain"    Objective      Objective Measures updated at progress report unless specified.     Treatment     Bert received the treatments listed below:      Therapeutic Exercises to develop strength, endurance, ROM, flexibility, posture, and core stabilization for 15 minutes including:  Nustep Level 3 x 15 minutes; use back NuStep  Self-mobilization of plantar fascia using lacrosse ball x 2mins  Seated R Tibial internal rotation (excessive ext) Passive 15x, Active 15x  Seated R MTP Flexion 20x  Seated R Soleus stretch [id number]  Seated Hamstring stretch with foot propped on stool [id number]  Seated R Ankle PF " (knee extended), Blue theraband, 20x  Seated R Ankle PF (knee flexed), 5lb DB on knee, 20x  3 way Hip Flexion x 15 ea Ryley RTB  Marching x 15  STS x 8    Neuromuscular Re-Education activities to improve: Balance and Posture for 10 minutes. The following activities were included:  Normal KATYA , Stable surface , eyes closed, multiple rounds with moderate force pertubation's   KATYA, Unstable surface, eyes closed, multiple rounds to max duration  Normal KATYA, Unstable surface, eyes open progressing to eyes closed, multiple rounds to max duration  Narrow KATYA, Unstable surface, eyes open progressing to eyes closed, multiple rounds to max duration  Split Stance KATYA (R/L foot fwd each), Unstable surface, eyes open progressing to eyes closed, multiple rounds to max duration  Semi-Tandem KATYA (R/L foot fwd each), Unstable surface, eyes open, multiple rounds to max duration  Semi-Tandem KATYA (R/L foot fwd each), Stable surface, eyes closed, multiple rounds to max duration    Manual Therapy Techniques: were applied to the: Right lower leg for 28 minutes, including:  Myofascial Release   Left piriformis    Bilateral psoas    Left pec minor   Multifidus/lats dorsi   Bilateral QL   Cranial sacral   KT taping of the left tarsal tunnel.    Right tibial internal rotation mobs and mobs for proximal fibula in anterolateral direction    Therapeutic Activities to improve functional performance for - minutes, including:    Gait Training to improve functional mobility and safety for - minutes, including:    Direct Contact Modalities after being cleared for contraindications:     Supervised Modalities after being cleared for contradictions:     Hot pack for - minutes to -.    Cold pack for - minutes to -.    Patient Education and Home Exercises       Education provided: HEP Review    Written Home Exercises Provided: Patient instructed to cont prior HEP. Exercises were reviewed and Bert was able to demonstrate them prior to the end of the  session.  Bert demonstrated good  understanding of the education provided. See EMR under Patient Instructions for exercises provided during therapy sessions    Assessment     Patient responded well to myofascial releases and noted better range of motion and less pain.    --    Jerry is a 63 y.o. male referred to outpatient Physical Therapy with a medical diagnosis of R26.81 (ICD-10-CM) - Unstable gait. Pt presents with Right foot pain, decline in LE strength, activity tolerance and balance.     Bert Is progressing well towards his goals.   Pt prognosis is Good.     Pt will continue to benefit from skilled outpatient physical therapy to address the deficits listed in the problem list box on initial evaluation, provide pt/family education and to maximize pt's level of independence in the home and community environment.   Pt's spiritual, cultural and educational needs considered and pt agreeable to plan of care and goals.     Anticipated barriers to physical therapy: None    Goals:   Short Term GOALS: 3 weeks. Pt agrees with goals set.  1. Patient demonstrates independence with HEP.   2.  Patient demonstrates independence with body mechanics.      Long Term GOALS: 6 weeks. Pt agrees with goals set.  1. Patient demonstrates increased core strength /POST chain  to improve tolerance to functional activities.   2. Patient demonstrates increased strength BLE's to 5/5 or greater to improve tolerance to functional activities.   3. Patient demonstrates improved overall function per FOTO to 75% OR better.  4. Improve dynamic balance demonstrate on the wilcox to 45 or better indicating low falls risk    Plan     Advance patient as tolerated.    Plan of care Certification: 5/23/2023 to 8/21/23.     Outpatient Physical Therapy 2 times weekly for 6 weeks to include the following interventions: Manual Therapy, Moist Heat/ Ice, Neuromuscular Re-ed, Therapeutic Activities, and Therapeutic Exercise, Gait training, Neuro re-ed.  Modalities as appropriate.    Wu Llamas, FAVIAN

## 2023-06-14 ENCOUNTER — CLINICAL SUPPORT (OUTPATIENT)
Dept: REHABILITATION | Facility: HOSPITAL | Age: 63
End: 2023-06-14
Attending: PODIATRIST
Payer: COMMERCIAL

## 2023-06-14 DIAGNOSIS — R29.898 WEAKNESS OF EXTREMITY: Primary | ICD-10-CM

## 2023-06-14 DIAGNOSIS — R68.89 WORSENING FUNCTIONAL ENDURANCE: ICD-10-CM

## 2023-06-14 DIAGNOSIS — R26.81 UNSTEADINESS ON FEET: ICD-10-CM

## 2023-06-14 PROCEDURE — 97110 THERAPEUTIC EXERCISES: CPT | Mod: PN

## 2023-06-14 PROCEDURE — 97140 MANUAL THERAPY 1/> REGIONS: CPT | Mod: PN

## 2023-06-14 NOTE — PROGRESS NOTES
"[facility]   Physical Therapy Treatment Note      Name: Jerry Islas  Clinic Number: [id number]    Therapy Diagnosis:   Encounter Diagnoses   Name Primary?    Weakness of extremity Yes    Unsteadiness on feet     Worsening functional endurance      Physician: Gentry Flores DPM    Visit Date: 6/14/2023    Physician Orders: PT Eval and Treat   Medical Diagnosis from Referral: R26.81 (ICD-10-CM) - Unstable gait  Evaluation Date: 5/23/2023  Authorization Period Expiration: 5/23/24  Plan of Care Expiration: 8/21/23  Visit # / Visits authorized: 6 / 20 (Not including Initial Evaluation)  FOTO Visit #:  1/3    PTA Visit #: 1/5     Time In: 10:00 AM  Time Out: 10:55 AM  Total Billable Time: 53 minutes    Precautions: Standard precautions; Subtalar ORIF    Subjective     Pt reports: The pain is mostly all in the back and right foot. The manual releases have helped for the back and numbness in BUE.    He was compliant with home exercise program.  Response to previous treatment: No adverse response to previous session.   Functional change: None reported    Pain: 6/10; worst 8/10, best 3/10   Location: Right foot/ankle, Lumbar  Description: Aching, Throbbing  Aggravating Factors: Walking, standing for prolonged periods,turning/twisting on the RLE  Easing Factors: Pain medication/Narco     Patients goals: "Walk with a regular gait and reduced pain"    Objective      Objective Measures updated at progress report unless specified.     Treatment     Bert received the treatments listed below:      Therapeutic Exercises to develop strength, endurance, ROM, flexibility, posture, and core stabilization for 45 minutes including:  New Nustep, Seat 15, BUE 14, Level 3 x 15 minutes -- Progress resistance at next session  Self-mobilization of plantar fascia using lacrosse ball x 2mins  Seated R Tibial internal rotation (excessive ext) Passive 5x -- greater benefit with stretch using strap  Seated Right " Tibial Int Rot stretch with strap assist, 3 x 1mins  Seated R Tibial internal rotation (excessive ext) Active 10x  Seated R MTP Flexion 20x  Following Manual:  Passive R Ankle PF stretch, performed by PT, [id number]  Passive R Ankle DF stretch, performed by PT, [id number]  Passive R Ankle Inv stretch, performed by PT, [id number]  Supine R Ankle Isometric DF, Min-Mod resistance from PT, 1x15  Supine R Ankle Isometric Eversion, Min-Mod resistance from PT, 1x15  Supine R Ankle Inversion with PT overpressure blocking ankle from excessive eversion resting pos, 1x15  Supine R Ankle Inversion Active ROM 2x20  Seated R Soleus stretch [id number]  Seated Hamstring stretch with foot propped on stool [id number]  Seated R Ankle PF (knee extended), Blue theraband, 20x  Seated R Ankle PF (knee flexed), 5lb DB on knee, 20x  3 way Hip Flexion x 15 ea Ryley RTB  Marching x 15  STS x 8    Neuromuscular Re-Education activities to improve: Balance and Posture for 0 minutes. The following activities were included:  Normal KATYA , Stable surface , eyes closed, multiple rounds with moderate force pertubation's   KATYA, Unstable surface, eyes closed, multiple rounds to max duration  Normal KATYA, Unstable surface, eyes open progressing to eyes closed, multiple rounds to max duration  Narrow KAYTA, Unstable surface, eyes open progressing to eyes closed, multiple rounds to max duration  Split Stance KATYA (R/L foot fwd each), Unstable surface, eyes open progressing to eyes closed, multiple rounds to max duration  Semi-Tandem KATYA (R/L foot fwd each), Unstable surface, eyes open, multiple rounds to max duration  Semi-Tandem KATYA (R/L foot fwd each), Stable surface, eyes closed, multiple rounds to max duration    Manual Therapy Techniques: were applied to the: Right lower leg for 8 minutes, including:  Joint Mobilizations:  Right tibial internal rotation mobs, Grade I-III  Right proximal fibula anterolateral mobs, Grade I-III  STM/DTM:  Right plantar fascia    Myofascial  "Release   Left piriformis    Bilateral psoas    Left pec minor   Multifidus/lats dorsi   Bilateral QL   Cranial sacral   KT taping of the left tarsal tunnel.    Therapeutic Activities to improve functional performance for - minutes, including:    Gait Training to improve functional mobility and safety for - minutes, including:    Direct Contact Modalities after being cleared for contraindications:     Supervised Modalities after being cleared for contradictions:     Hot pack for - minutes to -    Cold pack for - minutes to -    Patient Education and Home Exercises       Education provided: HEP Review    Written Home Exercises Provided: Patient instructed to cont prior HEP. Exercises were reviewed and Bert was able to demonstrate them prior to the end of the session.  Bert demonstrated good  understanding of the education provided. See EMR under Patient Instructions for exercises provided during therapy sessions    Assessment     Patient demonstrates good tolerance with today's session. Session focused on right lower leg mobility, flexibility, and ankle strength within patient tolerance.   Session initiated on Nustep Bike with patient reporting readiness for progression of resistance at next session. Self-mobilization of the plantar fascia elicits significant tenderness, especially noted at the heel. Due to subtalar ORIF, joint mobilizations focused at tib-fib in effort to reduce everted foot position as much as able. Patient notes "good stretch" when performing strap-assisted tibial internal rotation. While inversion motion is with significant restriction, patient is able to perform motion with palpable contraction at posterior tibialis through limited range. Patient reports fatigue with exercise but denies pain. No pain exacerbation at session conclusion.    --    Jerry is a 63 y.o. male referred to outpatient Physical Therapy with a medical diagnosis of R26.81 (ICD-10-CM) - Unstable gait. Pt presents with " Right foot pain, decline in LE strength, activity tolerance and balance.    Bert Is progressing well towards his goals.   Pt prognosis is Good.     Pt will continue to benefit from skilled outpatient physical therapy to address the deficits listed in the problem list box on initial evaluation, provide pt/family education and to maximize pt's level of independence in the home and community environment.   Pt's spiritual, cultural and educational needs considered and pt agreeable to plan of care and goals.     Anticipated barriers to physical therapy: None    Goals:   Short Term GOALS: 3 weeks. Pt agrees with goals set.  1. Patient demonstrates independence with HEP.   2. Patient demonstrates independence with body mechanics.      Long Term GOALS: 6 weeks. Pt agrees with goals set.  1. Patient demonstrates increased core strength /POST chain  to improve tolerance to functional activities.   2. Patient demonstrates increased strength BLE's to 5/5 or greater to improve tolerance to functional activities.   3. Patient demonstrates improved overall function per FOTO to 75% OR better.  4. Improve dynamic balance demonstrate on the wilcox to 45 or better indicating low falls risk    Plan     Advance patient as tolerated.    Plan of Care Certification: 5/23/2023 to 8/21/23.     Outpatient Physical Therapy 2 times weekly for 6 weeks to include the following interventions: Manual Therapy, Moist Heat/ Ice, Neuromuscular Re-ed, Therapeutic Activities, and Therapeutic Exercise, Gait training, Neuro re-ed. Modalities as appropriate.    Kandi Arteaga, PT

## 2023-06-19 ENCOUNTER — CLINICAL SUPPORT (OUTPATIENT)
Dept: REHABILITATION | Facility: HOSPITAL | Age: 63
End: 2023-06-19
Attending: PODIATRIST
Payer: COMMERCIAL

## 2023-06-19 DIAGNOSIS — R26.81 UNSTEADINESS ON FEET: ICD-10-CM

## 2023-06-19 DIAGNOSIS — R68.89 WORSENING FUNCTIONAL ENDURANCE: ICD-10-CM

## 2023-06-19 DIAGNOSIS — R29.898 WEAKNESS OF EXTREMITY: Primary | ICD-10-CM

## 2023-06-19 PROCEDURE — 97110 THERAPEUTIC EXERCISES: CPT | Mod: PN,CQ

## 2023-06-19 PROCEDURE — 97140 MANUAL THERAPY 1/> REGIONS: CPT | Mod: PN,CQ

## 2023-06-19 NOTE — PROGRESS NOTES
"[facility]   Physical Therapy Treatment Note      Name: Jerry Islas  Clinic Number: [id number]    Therapy Diagnosis:   Encounter Diagnoses   Name Primary?    Weakness of extremity Yes    Unsteadiness on feet     Worsening functional endurance      Physician: Gentry Flores DPM    Visit Date: 6/19/2023    Physician Orders: PT Eval and Treat   Medical Diagnosis from Referral: R26.81 (ICD-10-CM) - Unstable gait  Evaluation Date: 5/23/2023  Authorization Period Expiration: 5/23/24  Plan of Care Expiration: 8/21/23  Visit # / Visits authorized: 7/ 20 (Not including Initial Evaluation)  FOTO Visit #:  1/3    PTA Visit #: 1/5     Time In: 10:05 AM  Time Out: 10:58 AM  Total Billable Time: 53 minutes    Precautions: Standard precautions; Subtalar ORIF    Subjective     Pt reports: feeling better today.    He was compliant with home exercise program.  Response to previous treatment: No adverse response to previous session.   Functional change: None reported    Pain: 4/10; worst 8/10, best 3/10   Location: Right foot/ankle, Lumbar  Description: Aching, Throbbing  Aggravating Factors: Walking, standing for prolonged periods,turning/twisting on the RLE  Easing Factors: Pain medication/Narco     Patients goals: "Walk with a regular gait and reduced pain"    Objective      Objective Measures updated at progress report unless specified.     Treatment     Bert received the treatments listed below:      Therapeutic Exercises to develop strength, endurance, ROM, flexibility, posture, and core stabilization for 18 minutes including:  New Nustep, Seat 15, BUE 14, Level 3 x 18 minutes -- Progress resistance at next session  Self-mobilization of plantar fascia using lacrosse ball x 2mins  Seated R Tibial internal rotation (excessive ext) Passive 5x -- greater benefit with stretch using strap  Seated Right Tibial Int Rot stretch with strap assist, 3 x 1mins  Seated R Tibial internal rotation " (excessive ext) Active 10x  Seated R MTP Flexion 20x  Following Manual:  Passive R Ankle PF stretch, performed by PT, [id number]  Passive R Ankle DF stretch, performed by PT, [id number]  Passive R Ankle Inv stretch, performed by PT, [id number]  Supine R Ankle Isometric DF, Min-Mod resistance from PT, 1x15  Supine R Ankle Isometric Eversion, Min-Mod resistance from PT, 1x15  Supine R Ankle Inversion with PT overpressure blocking ankle from excessive eversion resting pos, 1x15  Supine R Ankle Inversion Active ROM 2x20  Seated R Soleus stretch [id number]  Seated Hamstring stretch with foot propped on stool [id number]  Seated R Ankle PF (knee extended), Blue theraband, 20x  Seated R Ankle PF (knee flexed), 5lb DB on knee, 20x  3 way Hip Flexion x 15 ea Ryley RTB  Marching x 15  STS x 8    Neuromuscular Re-Education activities to improve: Balance and Posture for 0 minutes. The following activities were included:  Normal KATYA , Stable surface , eyes closed, multiple rounds with moderate force pertubation's   KATYA, Unstable surface, eyes closed, multiple rounds to max duration  Normal KATYA, Unstable surface, eyes open progressing to eyes closed, multiple rounds to max duration  Narrow KATYA, Unstable surface, eyes open progressing to eyes closed, multiple rounds to max duration  Split Stance KATYA (R/L foot fwd each), Unstable surface, eyes open progressing to eyes closed, multiple rounds to max duration  Semi-Tandem KATYA (R/L foot fwd each), Unstable surface, eyes open, multiple rounds to max duration  Semi-Tandem KATYA (R/L foot fwd each), Stable surface, eyes closed, multiple rounds to max duration    Manual Therapy Techniques: were applied to the: Right lower leg for 40 minutes, including:  Joint Mobilizations:  Right tibial internal rotation mobs, Grade I-III  Right proximal fibula anterolateral mobs, Grade I-III  STM/DTM:  Right plantar fascia    Myofascial Release   Left piriformis    Bilateral psoas    Left pec minor   Multifidus/lats  dorsi   Bilateral QL   Cranial sacral   KT taping of the left tarsal tunnel.    Therapeutic Activities to improve functional performance for - minutes, including:    Gait Training to improve functional mobility and safety for - minutes, including:    Direct Contact Modalities after being cleared for contraindications:     Supervised Modalities after being cleared for contradictions:     Hot pack for - minutes to -    Cold pack for - minutes to -    Patient Education and Home Exercises       Education provided: HEP Review    Written Home Exercises Provided: Patient instructed to cont prior HEP. Exercises were reviewed and Bert was able to demonstrate them prior to the end of the session.  Bert demonstrated good  understanding of the education provided. See EMR under Patient Instructions for exercises provided during therapy sessions    Assessment     Patient demonstrates good tolerance with today's session. Session focused on myofascial releases to improve weight distribution, posture and balance.    Jerry is a 63 y.o. male referred to outpatient Physical Therapy with a medical diagnosis of R26.81 (ICD-10-CM) - Unstable gait. Pt presents with Right foot pain, decline in LE strength, activity tolerance and balance.    Bert Is progressing well towards his goals.   Pt prognosis is Good.     Pt will continue to benefit from skilled outpatient physical therapy to address the deficits listed in the problem list box on initial evaluation, provide pt/family education and to maximize pt's level of independence in the home and community environment.   Pt's spiritual, cultural and educational needs considered and pt agreeable to plan of care and goals.     Anticipated barriers to physical therapy: None    Goals:   Short Term GOALS: 3 weeks. Pt agrees with goals set.  1. Patient demonstrates independence with HEP.   2. Patient demonstrates independence with body mechanics.      Long Term GOALS: 6 weeks. Pt agrees with goals  set.  1. Patient demonstrates increased core strength /POST chain  to improve tolerance to functional activities.   2. Patient demonstrates increased strength BLE's to 5/5 or greater to improve tolerance to functional activities.   3. Patient demonstrates improved overall function per FOTO to 75% OR better.  4. Improve dynamic balance demonstrate on the wilcox to 45 or better indicating low falls risk    Plan     Advance patient as tolerated.    Plan of Care Certification: 5/23/2023 to 8/21/23.     Outpatient Physical Therapy 2 times weekly for 6 weeks to include the following interventions: Manual Therapy, Moist Heat/ Ice, Neuromuscular Re-ed, Therapeutic Activities, and Therapeutic Exercise, Gait training, Neuro re-ed. Modalities as appropriate.    Wu Llamas, PTA

## 2023-06-21 ENCOUNTER — CLINICAL SUPPORT (OUTPATIENT)
Dept: REHABILITATION | Facility: HOSPITAL | Age: 63
End: 2023-06-21
Attending: PODIATRIST
Payer: COMMERCIAL

## 2023-06-21 DIAGNOSIS — R29.898 WEAKNESS OF EXTREMITY: Primary | ICD-10-CM

## 2023-06-21 DIAGNOSIS — R26.81 UNSTEADINESS ON FEET: ICD-10-CM

## 2023-06-21 DIAGNOSIS — R68.89 WORSENING FUNCTIONAL ENDURANCE: ICD-10-CM

## 2023-06-21 PROCEDURE — 97112 NEUROMUSCULAR REEDUCATION: CPT | Mod: PN

## 2023-06-21 PROCEDURE — 97110 THERAPEUTIC EXERCISES: CPT | Mod: PN

## 2023-06-21 NOTE — PROGRESS NOTES
"[facility]   Physical Therapy Treatment Note /PROGRESS NOTE     Name: Jerry Islas  Clinic Number: [id number]    Therapy Diagnosis:   Encounter Diagnoses   Name Primary?    Weakness of extremity Yes    Unsteadiness on feet     Worsening functional endurance        Physician: Gentry Flores DPM    Visit Date: 6/21/2023    Physician Orders: PT Eval and Treat   Medical Diagnosis from Referral: R26.81 (ICD-10-CM) - Unstable gait  Evaluation Date: 5/23/2023  Authorization Period Expiration: 5/23/24  Plan of Care Expiration: 8/21/23  Visit # / Visits authorized: 8/ 20 (Not including Initial Evaluation)  FOTO Visit #:  2/3    PTA Visit #: 1/5     Time In: 10:00 AM  Time Out: 10:45 AM  Total Billable Time: 39 minutes    Precautions: Standard precautions; Subtalar ORIF    Subjective     Pt reports: pt reports right foot most painful in the morning  but is not bothering him at the moment. Pt states that he has been feeling better over all and able to get around more often.    He was compliant with home exercise program.  Response to previous treatment: No adverse response to previous session.   Functional change: None reported    Pain: 6/10; worst 6/10, best 3/10   Location: Lumbar  Description: Aching, Throbbing  Aggravating Factors: Walking, standing for prolonged periods,turning/twisting on the RLE  Easing Factors: Pain medication/Narco     Patients goals: "Walk with a regular gait and reduced pain"    Objective      Objective Measures updated at progress report unless specified.   Observation: ER of the Right foot ; [place] brace donned to R ankle/foot  Posture: Rounded shoulders with FWD head posture     Upper Extremity Strength  RUE     5/5  LUE     5/5  : Adequate/WNL     Lower Extremity Strength  RLE     5/5  LLE      5/5     Trunk flexion :3 /4  Trunk Extension :3/5  Functional mobility:  Gait: Patient ambulating with out  AD with no assistance for 400 feet with the following " gait abnormalities: decreased stride ,decreased clearance R LE  Turns: extra steps on turn around x 6     30 SEC STS x 12 REPS     Transfers:               Sit to stand:     Indep              Stairs: Indep  Joint Mobility: Body part Right ANKLE restricted     Palpation: significant tenderness to palpation at Bilateral plantar aspect of feet     Sensation: Altered; Hx of neuropathy      Flexibility:                HAMSTRING  = R moderate restriction, L minimal restriction              Quads : R Moderate restriction, L Moderate     JASSO  BALANCE ASSESSMENT TOOL  1. Sitting to Standing   4 - able to stand without using hands and stabilize independently  2. Standing Unsupported   4 - able to stand safely 2 minutes without hold  3. Sitting Unsupported   4 - able to sit safely and securely 2 minutes  4. Standing to Sitting   4 - sits safely with minimal use of hands  5. Pivot Transfer   4 - able to trnasfer safely with minor use of hands  6. Standing with Eyes Closed   4 - albe to stand 10 seconds safely  7. Standing with Feet Together   4 - able to place feet together independently and stand 1 minute safely  8. Reaching Forward with Outstretched Arm   3 - can reach forward 12 cm/5 inches safely  9. Retrieving Object from Floor   4 - able to  slipper safely and easily  10. Turning to Look Behind   3 - looks behind one side only, other side less weight shift  11. Turning 360 Degrees   2 - able to turn 360 safely but slowly  12. Placing Alternate Foot on Step   3 - able to stand independently and completely 8 steps > 20 seconds  13. Standing with One Foot in Front   2 - able to take small step indenpendently and hold 30 seconds  14. Standing on One Foot   1 - tries to lift leg and unable to hold 3 seconds but remains standing independently    TOTAL SCORE: 46  Maximum: 56  Score:   0-20= high fall risk   21-40 moderate fall risk   41-56 low fall risk     Fall risk cut-off scores:   Elderly and History of falls:  <51/56   Elderly and No history of falls: <42/56   CVA: <45/56        PT Evaluation Completed? Yes  Discussed Plan of Care with patient: Yes     Treatment     Bert received the treatments listed below:      Therapeutic Exercises to develop strength, endurance, ROM, flexibility, posture, and core stabilization for 16 minutes including:  New Nustep, Seat 15, BUE 14, Level 4 x 15 minutes   Self-mobilization of plantar fascia using lacrosse ball x 2mins  Seated R Tibial internal rotation (excessive ext) Passive 5x -- greater benefit with stretch using strap  Seated Right Tibial Int Rot stretch with strap assist, 3 x 1mins  Seated R Tibial internal rotation (excessive ext) Active 10x  Seated R MTP Flexion 20x  Following Manual:  Passive R Ankle PF stretch, performed by PT, [id number]  Passive R Ankle DF stretch, performed by PT, [id number]  Passive R Ankle Inv stretch, performed by PT, [id number]  Supine R Ankle Isometric DF, Min-Mod resistance from PT, 1x15  Supine R Ankle Isometric Eversion, Min-Mod resistance from PT, 1x15  Supine R Ankle Inversion with PT overpressure blocking ankle from excessive eversion resting pos, 1x15  Supine R Ankle Inversion Active ROM 2x20  Seated R Soleus stretch [id number]  Seated Hamstring stretch with foot propped on stool [id number]  Seated R Ankle PF (knee extended), Blue theraband, 20x  Seated R Ankle PF (knee flexed), 5lb DB on knee, 20x  3 way Hip Flexion x 15 ea Ryley RTB  Marching x 15  STS x 8  Passive Hamstring stretch R 3 x 20 sec    Neuromuscular Re-Education activities to improve: Balance and Posture for 23 minutes. The following activities were included:  Normal KATYA , Stable surface , eyes closed, multiple rounds with moderate force pertubation's   KATYA, Unstable surface, eyes closed, multiple rounds to max duration  Normal KATYA, Unstable surface, eyes open progressing to eyes closed, multiple rounds to max duration  Narrow KATYA, Unstable surface, eyes open progressing to eyes closed, multiple  rounds to max duration  Split Stance KATYA (R/L foot fwd each), Unstable surface, eyes open progressing to eyes closed, multiple rounds to max duration  Tandem KATYA (R/L foot fwd each), Unstable surface, eyes open, multiple rounds to max duration  Tandem KATYA (R/L foot fwd each), Stable surface, eyes closed, multiple rounds to max duration  Tandem KATYA (R/L foot fwd each), Stable surface, eyes closed, multiple rounds to max duration  Reaching outside KATYA all directions on foam    Manual Therapy Techniques: were applied to the: Right lower leg for 40 minutes, including:  Joint Mobilizations:  Right tibial internal rotation mobs, Grade I-III  Right proximal fibula anterolateral mobs, Grade I-III  STM/DTM:  Right plantar fascia    Myofascial Release   Left piriformis    Bilateral psoas    Left pec minor   Multifidus/lats dorsi   Bilateral QL   Cranial sacral   KT taping of the left tarsal tunnel.    Therapeutic Activities to improve functional performance for - minutes, including:    Gait Training to improve functional mobility and safety for - minutes, including:    Direct Contact Modalities after being cleared for contraindications:     Supervised Modalities after being cleared for contradictions:     Hot pack for - minutes to -    Cold pack for - minutes to -    Patient Education and Home Exercises       Education provided: HEP Review    Written Home Exercises Provided: Patient instructed to cont prior HEP. Exercises were reviewed and Bert was able to demonstrate them prior to the end of the session.  Bert demonstrated good  understanding of the education provided. See EMR under Patient Instructions for exercises provided during therapy sessions    Assessment     Patient demonstrates good tolerance with today's session.Activities limited 2/2 to pt request to end early to attend to the workers at his home. Progress note completed with over all improvements in strength, balance, activity tolerance, R ankle/foot pain ,  functional mobility and ambulation. Improving of trunk control with reduced assistance required for supine to sit edge of plinth.Pt is now classified as a low falls risk.     Jerry is a 63 y.o. male referred to outpatient Physical Therapy with a medical diagnosis of R26.81 (ICD-10-CM) - Unstable gait. Pt presents with Right foot pain, decline in LE strength, activity tolerance and balance.    Bert Is progressing well towards his goals.   Pt prognosis is Good.     Pt will continue to benefit from skilled outpatient physical therapy to address the deficits listed in the problem list box on initial evaluation, provide pt/family education and to maximize pt's level of independence in the home and community environment.   Pt's spiritual, cultural and educational needs considered and pt agreeable to plan of care and goals.     Anticipated barriers to physical therapy: None    Goals:   Short Term GOALS: 3 weeks. Pt agrees with goals set.  1. Patient demonstrates independence with HEP. >MET  2. Patient demonstrates independence with body mechanics. >MET     Long Term GOALS: 6 weeks. Pt agrees with goals set.  1. Patient demonstrates increased core strength /POST chain  to improve tolerance to functional activities. >PROGRESSING  2. Patient demonstrates increased strength BLE's to 5/5 or greater to improve tolerance to functional activities. >MET  3. Patient demonstrates improved overall function per FOTO to 75% OR better.>PROGRESSING  4. Improve dynamic balance demonstrate on the wilcox to 45 or better indicating low falls risk> MET    Plan     Advance patient as tolerated.    Plan of Care Certification: 5/23/2023 to 8/21/23.     Outpatient Physical Therapy 2 times weekly for 6 weeks to include the following interventions: Manual Therapy, Moist Heat/ Ice, Neuromuscular Re-ed, Therapeutic Activities, and Therapeutic Exercise, Gait training, Neuro re-ed. Modalities as appropriate.    Eliana Arteaga, PT

## 2023-06-27 ENCOUNTER — CLINICAL SUPPORT (OUTPATIENT)
Dept: REHABILITATION | Facility: HOSPITAL | Age: 63
End: 2023-06-27
Attending: PODIATRIST
Payer: COMMERCIAL

## 2023-06-27 DIAGNOSIS — R29.898 WEAKNESS OF EXTREMITY: Primary | ICD-10-CM

## 2023-06-27 DIAGNOSIS — R26.81 UNSTEADINESS ON FEET: ICD-10-CM

## 2023-06-27 DIAGNOSIS — R68.89 WORSENING FUNCTIONAL ENDURANCE: ICD-10-CM

## 2023-06-27 PROCEDURE — 97140 MANUAL THERAPY 1/> REGIONS: CPT | Mod: PN,CQ

## 2023-06-27 NOTE — PROGRESS NOTES
"[facility]   Physical Therapy Treatment Note /PROGRESS NOTE     Name: Jerry Islas  Clinic Number: [id number]    Therapy Diagnosis:   Encounter Diagnoses   Name Primary?    Weakness of extremity Yes    Unsteadiness on feet     Worsening functional endurance        Physician: Gentry Flores DPM    Visit Date: 6/27/2023    Physician Orders: PT Eval and Treat   Medical Diagnosis from Referral: R26.81 (ICD-10-CM) - Unstable gait  Evaluation Date: 5/23/2023  Authorization Period Expiration: 5/23/24  Plan of Care Expiration: 8/21/23  Visit # / Visits authorized: 9/ 20 (Not including Initial Evaluation)  FOTO Visit #:  2/3    PTA Visit #: 2/5     Time In: 2:35 am  Time Out: 3:15 am  Total Billable Time: 38 minutes    Precautions: Standard precautions; Subtalar ORIF    Subjective     Pt reports: the releases have improved my posture, gait and pain.    He was compliant with home exercise program.  Response to previous treatment: No adverse response to previous session.   Functional change: None reported    Pain: 4/10; worst 6/10, best 3/10   Location: Lumbar  Description: Aching, Throbbing  Aggravating Factors: Walking, standing for prolonged periods,turning/twisting on the RLE  Easing Factors: Pain medication/Narco     Patients goals: "Walk with a regular gait and reduced pain"    Objective      Objective Measures updated at progress report unless specified.   Observation: ER of the Right foot ; [place] brace donned to R ankle/foot  Posture: Rounded shoulders with FWD head posture     Upper Extremity Strength  RUE     5/5  LUE     5/5  : Adequate/WNL     Lower Extremity Strength  RLE     5/5  LLE      5/5     Trunk flexion :3 /4  Trunk Extension :3/5  Functional mobility:  Gait: Patient ambulating with out  AD with no assistance for 400 feet with the following gait abnormalities: decreased stride ,decreased clearance R LE  Turns: extra steps on turn around x 6     30 SEC STS x 12 " REPS     Transfers:               Sit to stand:     Indep              Stairs: Indep  Joint Mobility: Body part Right ANKLE restricted     Palpation: significant tenderness to palpation at Bilateral plantar aspect of feet     Sensation: Altered; Hx of neuropathy      Flexibility:                HAMSTRING  = R moderate restriction, L minimal restriction              Quads : R Moderate restriction, L Moderate     JASSO  BALANCE ASSESSMENT TOOL  1. Sitting to Standing   4 - able to stand without using hands and stabilize independently  2. Standing Unsupported   4 - able to stand safely 2 minutes without hold  3. Sitting Unsupported   4 - able to sit safely and securely 2 minutes  4. Standing to Sitting   4 - sits safely with minimal use of hands  5. Pivot Transfer   4 - able to trnasfer safely with minor use of hands  6. Standing with Eyes Closed   4 - albe to stand 10 seconds safely  7. Standing with Feet Together   4 - able to place feet together independently and stand 1 minute safely  8. Reaching Forward with Outstretched Arm   3 - can reach forward 12 cm/5 inches safely  9. Retrieving Object from Floor   4 - able to  slipper safely and easily  10. Turning to Look Behind   3 - looks behind one side only, other side less weight shift  11. Turning 360 Degrees   2 - able to turn 360 safely but slowly  12. Placing Alternate Foot on Step   3 - able to stand independently and completely 8 steps > 20 seconds  13. Standing with One Foot in Front   2 - able to take small step indenpendently and hold 30 seconds  14. Standing on One Foot   1 - tries to lift leg and unable to hold 3 seconds but remains standing independently    TOTAL SCORE: 46  Maximum: 56  Score:   0-20= high fall risk   21-40 moderate fall risk   41-56 low fall risk     Fall risk cut-off scores:   Elderly and History of falls: <51/56   Elderly and No history of falls: <42/56   CVA: <45/56        PT Evaluation Completed? Yes  Discussed Plan of Care with  patient: Yes     Treatment     Bert received the treatments listed below:      Therapeutic Exercises to develop strength, endurance, ROM, flexibility, posture, and core stabilization for 0 minutes including:  New Nustep, Seat 15, BUE 14, Level 4 x 15 minutes   Self-mobilization of plantar fascia using lacrosse ball x 2mins  Seated R Tibial internal rotation (excessive ext) Passive 5x -- greater benefit with stretch using strap  Seated Right Tibial Int Rot stretch with strap assist, 3 x 1mins  Seated R Tibial internal rotation (excessive ext) Active 10x  Seated R MTP Flexion 20x  Following Manual:  Passive R Ankle PF stretch, performed by PT, [id number]  Passive R Ankle DF stretch, performed by PT, [id number]  Passive R Ankle Inv stretch, performed by PT, [id number]  Supine R Ankle Isometric DF, Min-Mod resistance from PT, 1x15  Supine R Ankle Isometric Eversion, Min-Mod resistance from PT, 1x15  Supine R Ankle Inversion with PT overpressure blocking ankle from excessive eversion resting pos, 1x15  Supine R Ankle Inversion Active ROM 2x20  Seated R Soleus stretch [id number]  Seated Hamstring stretch with foot propped on stool [id number]  Seated R Ankle PF (knee extended), Blue theraband, 20x  Seated R Ankle PF (knee flexed), 5lb DB on knee, 20x  3 way Hip Flexion x 15 ea Ryley RTB  Marching x 15  STS x 8  Passive Hamstring stretch R 3 x 20 sec    Neuromuscular Re-Education activities to improve: Balance and Posture for 23 minutes. The following activities were included:  Normal KATYA , Stable surface , eyes closed, multiple rounds with moderate force pertubation's   KATYA, Unstable surface, eyes closed, multiple rounds to max duration  Normal KATYA, Unstable surface, eyes open progressing to eyes closed, multiple rounds to max duration  Narrow KATYA, Unstable surface, eyes open progressing to eyes closed, multiple rounds to max duration  Split Stance KATYA (R/L foot fwd each), Unstable surface, eyes open progressing to eyes closed, multiple  rounds to max duration  Tandem KATYA (R/L foot fwd each), Unstable surface, eyes open, multiple rounds to max duration  Tandem KATYA (R/L foot fwd each), Stable surface, eyes closed, multiple rounds to max duration  Tandem KATYA (R/L foot fwd each), Stable surface, eyes closed, multiple rounds to max duration  Reaching outside KATYA all directions on foam    Manual Therapy Techniques: were applied to the: Right lower leg for 38 minutes, including:  Joint Mobilizations:  Right tibial internal rotation mobs, Grade I-III  Right proximal fibula anterolateral mobs, Grade I-III  STM/DTM:  Right plantar fascia    Myofascial Release   Left piriformis    Bilateral psoas    Left pec minor   Multifidus/lats dorsi   Bilateral QL   Cranial sacral   KT taping of the left tarsal tunnel.    Therapeutic Activities to improve functional performance for - minutes, including:    Gait Training to improve functional mobility and safety for - minutes, including:    Direct Contact Modalities after being cleared for contraindications:     Supervised Modalities after being cleared for contradictions:     Hot pack for - minutes to -    Cold pack for - minutes to -    Patient Education and Home Exercises       Education provided: HEP Review    Written Home Exercises Provided: Patient instructed to cont prior HEP. Exercises were reviewed and Bert was able to demonstrate them prior to the end of the session.  Bert demonstrated good  understanding of the education provided. See EMR under Patient Instructions for exercises provided during therapy sessions    Assessment     Patient is improving with better myofascial balance noted by posture and gait.  Pain level in his back has also improved.      Jerry is a 63 y.o. male referred to outpatient Physical Therapy with a medical diagnosis of R26.81 (ICD-10-CM) - Unstable gait. Pt presents with Right foot pain, decline in LE strength, activity tolerance and balance.    Bert Is progressing well towards his  goals.   Pt prognosis is Good.     Pt will continue to benefit from skilled outpatient physical therapy to address the deficits listed in the problem list box on initial evaluation, provide pt/family education and to maximize pt's level of independence in the home and community environment.   Pt's spiritual, cultural and educational needs considered and pt agreeable to plan of care and goals.     Anticipated barriers to physical therapy: None    Goals:   Short Term GOALS: 3 weeks. Pt agrees with goals set.  1. Patient demonstrates independence with HEP. >MET  2. Patient demonstrates independence with body mechanics. >MET     Long Term GOALS: 6 weeks. Pt agrees with goals set.  1. Patient demonstrates increased core strength /POST chain  to improve tolerance to functional activities. >PROGRESSING  2. Patient demonstrates increased strength BLE's to 5/5 or greater to improve tolerance to functional activities. >MET  3. Patient demonstrates improved overall function per FOTO to 75% OR better.>PROGRESSING  4. Improve dynamic balance demonstrate on the wilcox to 45 or better indicating low falls risk> MET    Plan     Advance patient as tolerated.    Plan of Care Certification: 5/23/2023 to 8/21/23.     Outpatient Physical Therapy 2 times weekly for 6 weeks to include the following interventions: Manual Therapy, Moist Heat/ Ice, Neuromuscular Re-ed, Therapeutic Activities, and Therapeutic Exercise, Gait training, Neuro re-ed. Modalities as appropriate.    Wu Llamas, PTA

## 2023-07-06 ENCOUNTER — CLINICAL SUPPORT (OUTPATIENT)
Dept: REHABILITATION | Facility: HOSPITAL | Age: 63
End: 2023-07-06
Attending: PODIATRIST
Payer: COMMERCIAL

## 2023-07-06 DIAGNOSIS — R26.81 UNSTEADINESS ON FEET: ICD-10-CM

## 2023-07-06 DIAGNOSIS — R68.89 WORSENING FUNCTIONAL ENDURANCE: ICD-10-CM

## 2023-07-06 DIAGNOSIS — R29.898 WEAKNESS OF EXTREMITY: Primary | ICD-10-CM

## 2023-07-06 PROCEDURE — 97140 MANUAL THERAPY 1/> REGIONS: CPT | Mod: PN,CQ

## 2023-07-06 PROCEDURE — 97110 THERAPEUTIC EXERCISES: CPT | Mod: PN,CQ

## 2023-07-06 NOTE — PROGRESS NOTES
"[facility]   Physical Therapy Treatment Note /PROGRESS NOTE     Name: Jerry Islas  Clinic Number: [id number]    Therapy Diagnosis:   Encounter Diagnoses   Name Primary?    Weakness of extremity Yes    Unsteadiness on feet     Worsening functional endurance        Physician: Gentry Flores DPM    Visit Date: 7/6/2023    Physician Orders: PT Eval and Treat   Medical Diagnosis from Referral: R26.81 (ICD-10-CM) - Unstable gait  Evaluation Date: 5/23/2023  Authorization Period Expiration: 5/23/24  Plan of Care Expiration: 8/21/23  Visit # / Visits authorized: 10/ 20 (Not including Initial Evaluation)  FOTO Visit #:  2/3    PTA Visit #: 2/5     Time In: 11:10 am  Time Out: 12:05 pm  Total Billable Time: 53 minutes    Precautions: Standard precautions; Subtalar ORIF    Subjective     Pt reports: the releases have improved my posture, gait and pain, which has indirectly improved my balance.    He was compliant with home exercise program.  Response to previous treatment: No adverse response to previous session.   Functional change: None reported    Pain: 4/10; worst 6/10, best 3/10   Location: Lumbar  Description: Aching, Throbbing  Aggravating Factors: Walking, standing for prolonged periods,turning/twisting on the RLE  Easing Factors: Pain medication/Narco     Patients goals: "Walk with a regular gait and reduced pain"    Objective      Objective Measures updated at progress report unless specified.   Observation: ER of the Right foot ; [place] brace donned to R ankle/foot  Posture: Rounded shoulders with FWD head posture     Upper Extremity Strength  RUE     5/5  LUE     5/5  : Adequate/WNL     Lower Extremity Strength  RLE     5/5  LLE      5/5     Trunk flexion :3 /4  Trunk Extension :3/5  Functional mobility:  Gait: Patient ambulating with out  AD with no assistance for 400 feet with the following gait abnormalities: decreased stride ,decreased clearance R LE  Turns: extra " steps on turn around x 6     30 SEC STS x 12 REPS     Transfers:               Sit to stand:     Indep              Stairs: Indep  Joint Mobility: Body part Right ANKLE restricted     Palpation: significant tenderness to palpation at Bilateral plantar aspect of feet     Sensation: Altered; Hx of neuropathy      Flexibility:                HAMSTRING  = R moderate restriction, L minimal restriction              Quads : R Moderate restriction, L Moderate     JASSO  BALANCE ASSESSMENT TOOL  1. Sitting to Standing   4 - able to stand without using hands and stabilize independently  2. Standing Unsupported   4 - able to stand safely 2 minutes without hold  3. Sitting Unsupported   4 - able to sit safely and securely 2 minutes  4. Standing to Sitting   4 - sits safely with minimal use of hands  5. Pivot Transfer   4 - able to trnasfer safely with minor use of hands  6. Standing with Eyes Closed   4 - albe to stand 10 seconds safely  7. Standing with Feet Together   4 - able to place feet together independently and stand 1 minute safely  8. Reaching Forward with Outstretched Arm   3 - can reach forward 12 cm/5 inches safely  9. Retrieving Object from Floor   4 - able to  slipper safely and easily  10. Turning to Look Behind   3 - looks behind one side only, other side less weight shift  11. Turning 360 Degrees   2 - able to turn 360 safely but slowly  12. Placing Alternate Foot on Step   3 - able to stand independently and completely 8 steps > 20 seconds  13. Standing with One Foot in Front   2 - able to take small step indenpendently and hold 30 seconds  14. Standing on One Foot   1 - tries to lift leg and unable to hold 3 seconds but remains standing independently    TOTAL SCORE: 46  Maximum: 56  Score:   0-20= high fall risk   21-40 moderate fall risk   41-56 low fall risk     Fall risk cut-off scores:   Elderly and History of falls: <51/56   Elderly and No history of falls: <42/56   CVA: <45/56        PT  Evaluation Completed? Yes  Discussed Plan of Care with patient: Yes     Treatment     Bert received the treatments listed below:      Therapeutic Exercises to develop strength, endurance, ROM, flexibility, posture, and core stabilization for 15 minutes including:  New Nustep, Seat 15, BUE 14, Level 4 x 15 minutes   Self-mobilization of plantar fascia using lacrosse ball x 2mins  Seated R Tibial internal rotation (excessive ext) Passive 5x -- greater benefit with stretch using strap  Seated Right Tibial Int Rot stretch with strap assist, 3 x 1mins  Seated R Tibial internal rotation (excessive ext) Active 10x  Seated R MTP Flexion 20x  Following Manual:  Passive R Ankle PF stretch, performed by PT, [id number]  Passive R Ankle DF stretch, performed by PT, [id number]  Passive R Ankle Inv stretch, performed by PT, [id number]  Supine R Ankle Isometric DF, Min-Mod resistance from PT, 1x15  Supine R Ankle Isometric Eversion, Min-Mod resistance from PT, 1x15  Supine R Ankle Inversion with PT overpressure blocking ankle from excessive eversion resting pos, 1x15  Supine R Ankle Inversion Active ROM 2x20  Seated R Soleus stretch [id number]  Seated Hamstring stretch with foot propped on stool [id number]  Seated R Ankle PF (knee extended), Blue theraband, 20x  Seated R Ankle PF (knee flexed), 5lb DB on knee, 20x  3 way Hip Flexion x 15 ea Ryley RTB  Marching x 15  STS x 8  Passive Hamstring stretch R 3 x 20 sec    Neuromuscular Re-Education activities to improve: Balance and Posture for 23 minutes. The following activities were included:  Normal KATYA , Stable surface , eyes closed, multiple rounds with moderate force pertubation's   KATYA, Unstable surface, eyes closed, multiple rounds to max duration  Normal KATYA, Unstable surface, eyes open progressing to eyes closed, multiple rounds to max duration  Narrow KATYA, Unstable surface, eyes open progressing to eyes closed, multiple rounds to max duration  Split Stance KATYA (R/L foot fwd each), Unstable  surface, eyes open progressing to eyes closed, multiple rounds to max duration  Tandem KATYA (R/L foot fwd each), Unstable surface, eyes open, multiple rounds to max duration  Tandem KATYA (R/L foot fwd each), Stable surface, eyes closed, multiple rounds to max duration  Tandem KATYA (R/L foot fwd each), Stable surface, eyes closed, multiple rounds to max duration  Reaching outside KATYA all directions on foam    Manual Therapy Techniques: were applied to the: Right lower leg for 38 minutes, including:  Joint Mobilizations:  Right tibial internal rotation mobs, Grade I-III  Right proximal fibula anterolateral mobs, Grade I-III  STM/DTM:  Right plantar fascia    Myofascial Release   Left piriformis    Bilateral psoas    Left pec minor   Multifidus/lats dorsi   Bilateral QL   Cranial sacral   KT taping of the left tarsal tunnel.    Therapeutic Activities to improve functional performance for - minutes, including:    Gait Training to improve functional mobility and safety for - minutes, including:    Direct Contact Modalities after being cleared for contraindications:     Supervised Modalities after being cleared for contradictions:     Hot pack for - minutes to -    Cold pack for - minutes to -    Patient Education and Home Exercises       Education provided: HEP Review    Written Home Exercises Provided: Patient instructed to cont prior HEP. Exercises were reviewed and Bert was able to demonstrate them prior to the end of the session.  Bert demonstrated good  understanding of the education provided. See EMR under Patient Instructions for exercises provided during therapy sessions    Assessment     Patient is improving with better myofascial balance noted by posture and gait.  Pain level in his back has also improved.      Jerry is a 63 y.o. male referred to outpatient Physical Therapy with a medical diagnosis of R26.81 (ICD-10-CM) - Unstable gait. Pt presents with Right foot pain, decline in LE strength, activity  tolerance and balance.    Bert Is progressing well towards his goals.   Pt prognosis is Good.     Pt will continue to benefit from skilled outpatient physical therapy to address the deficits listed in the problem list box on initial evaluation, provide pt/family education and to maximize pt's level of independence in the home and community environment.   Pt's spiritual, cultural and educational needs considered and pt agreeable to plan of care and goals.     Anticipated barriers to physical therapy: None    Goals:   Short Term GOALS: 3 weeks. Pt agrees with goals set.  1. Patient demonstrates independence with HEP. >MET  2. Patient demonstrates independence with body mechanics. >MET     Long Term GOALS: 6 weeks. Pt agrees with goals set.  1. Patient demonstrates increased core strength /POST chain  to improve tolerance to functional activities. >PROGRESSING  2. Patient demonstrates increased strength BLE's to 5/5 or greater to improve tolerance to functional activities. >MET  3. Patient demonstrates improved overall function per FOTO to 75% OR better.>PROGRESSING  4. Improve dynamic balance demonstrate on the wilcox to 45 or better indicating low falls risk> MET    Plan     Advance patient as tolerated.    Plan of Care Certification: 5/23/2023 to 8/21/23.     Outpatient Physical Therapy 2 times weekly for 6 weeks to include the following interventions: Manual Therapy, Moist Heat/ Ice, Neuromuscular Re-ed, Therapeutic Activities, and Therapeutic Exercise, Gait training, Neuro re-ed. Modalities as appropriate.    Wu Llamas, PTA

## 2024-12-19 ENCOUNTER — HOSPITAL ENCOUNTER (OUTPATIENT)
Dept: RADIOLOGY | Facility: HOSPITAL | Age: 64
Discharge: HOME | End: 2024-12-19
Attending: PODIATRIST
Payer: COMMERCIAL

## 2024-12-19 ENCOUNTER — OFFICE VISIT (OUTPATIENT)
Dept: PODIATRY | Facility: CLINIC | Age: 64
End: 2024-12-19
Payer: COMMERCIAL

## 2024-12-19 ENCOUNTER — TELEPHONE (OUTPATIENT)
Dept: PODIATRY | Facility: CLINIC | Age: 64
End: 2024-12-19
Payer: COMMERCIAL

## 2024-12-19 VITALS
BODY MASS INDEX: 27.1 KG/M2 | HEIGHT: 75 IN | HEART RATE: 72 BPM | SYSTOLIC BLOOD PRESSURE: 142 MMHG | WEIGHT: 218 LBS | DIASTOLIC BLOOD PRESSURE: 89 MMHG

## 2024-12-19 DIAGNOSIS — S92.301A CLOSED NONDISPLACED FRACTURE OF METATARSAL BONE OF RIGHT FOOT, UNSPECIFIED METATARSAL, INITIAL ENCOUNTER: Primary | ICD-10-CM

## 2024-12-19 DIAGNOSIS — E11.9 COMPREHENSIVE DIABETIC FOOT EXAMINATION, TYPE 2 DM, ENCOUNTER FOR: ICD-10-CM

## 2024-12-19 DIAGNOSIS — S99.921A FOOT INJURY, RIGHT, INITIAL ENCOUNTER: ICD-10-CM

## 2024-12-19 DIAGNOSIS — M14.60 CHARCOT ARTHROPATHY: ICD-10-CM

## 2024-12-19 DIAGNOSIS — S99.911A ANKLE INJURY, RIGHT, INITIAL ENCOUNTER: ICD-10-CM

## 2024-12-19 DIAGNOSIS — E11.9 TYPE 2 DIABETES MELLITUS WITHOUT COMPLICATION, WITHOUT LONG-TERM CURRENT USE OF INSULIN: ICD-10-CM

## 2024-12-19 PROCEDURE — 3079F DIAST BP 80-89 MM HG: CPT | Mod: CPTII,S$GLB,, | Performed by: PODIATRIST

## 2024-12-19 PROCEDURE — 99215 OFFICE O/P EST HI 40 MIN: CPT | Mod: S$GLB,,, | Performed by: PODIATRIST

## 2024-12-19 PROCEDURE — 3077F SYST BP >= 140 MM HG: CPT | Mod: CPTII,S$GLB,, | Performed by: PODIATRIST

## 2024-12-19 PROCEDURE — 73610 X-RAY EXAM OF ANKLE: CPT | Mod: 26,RT,, | Performed by: RADIOLOGY

## 2024-12-19 PROCEDURE — 1160F RVW MEDS BY RX/DR IN RCRD: CPT | Mod: CPTII,S$GLB,, | Performed by: PODIATRIST

## 2024-12-19 PROCEDURE — 3288F FALL RISK ASSESSMENT DOCD: CPT | Mod: CPTII,S$GLB,, | Performed by: PODIATRIST

## 2024-12-19 PROCEDURE — 1159F MED LIST DOCD IN RCRD: CPT | Mod: CPTII,S$GLB,, | Performed by: PODIATRIST

## 2024-12-19 PROCEDURE — 4010F ACE/ARB THERAPY RXD/TAKEN: CPT | Mod: CPTII,S$GLB,, | Performed by: PODIATRIST

## 2024-12-19 PROCEDURE — 73630 X-RAY EXAM OF FOOT: CPT | Mod: 26,RT,, | Performed by: RADIOLOGY

## 2024-12-19 PROCEDURE — 99999 PR PBB SHADOW E&M-EST. PATIENT-LVL V: CPT | Mod: PBBFAC,,, | Performed by: PODIATRIST

## 2024-12-19 PROCEDURE — 1100F PTFALLS ASSESS-DOCD GE2>/YR: CPT | Mod: CPTII,S$GLB,, | Performed by: PODIATRIST

## 2024-12-19 PROCEDURE — 1125F AMNT PAIN NOTED PAIN PRSNT: CPT | Mod: CPTII,S$GLB,, | Performed by: PODIATRIST

## 2024-12-19 PROCEDURE — 3008F BODY MASS INDEX DOCD: CPT | Mod: CPTII,S$GLB,, | Performed by: PODIATRIST

## 2024-12-19 PROCEDURE — 73610 X-RAY EXAM OF ANKLE: CPT | Mod: TC,PN,RT

## 2024-12-19 PROCEDURE — 73630 X-RAY EXAM OF FOOT: CPT | Mod: TC,PN,RT

## 2024-12-19 RX ORDER — TESTOSTERONE CYPIONATE 200 MG/ML
INJECTION, SOLUTION INTRAMUSCULAR
COMMUNITY
Start: 2024-11-26

## 2024-12-19 RX ORDER — TAMSULOSIN HYDROCHLORIDE 0.4 MG/1
0.4 CAPSULE ORAL
COMMUNITY
Start: 2024-06-05

## 2024-12-19 RX ORDER — PANTOPRAZOLE SODIUM 40 MG/1
40 TABLET, DELAYED RELEASE ORAL
COMMUNITY
Start: 2023-04-18

## 2024-12-19 RX ORDER — GABAPENTIN 400 MG/1
CAPSULE ORAL
COMMUNITY
Start: 2023-04-18

## 2024-12-19 RX ORDER — TRAZODONE HYDROCHLORIDE 100 MG/1
100 TABLET ORAL
COMMUNITY
Start: 2024-10-03

## 2024-12-19 RX ORDER — TADALAFIL 5 MG/1
TABLET ORAL
COMMUNITY
Start: 2024-11-18

## 2024-12-19 RX ORDER — DICLOFENAC SODIUM 10 MG/G
4 GEL TOPICAL
COMMUNITY
Start: 2024-12-11

## 2024-12-19 NOTE — TELEPHONE ENCOUNTER
Patient given results and verbalized understanding. Patient is going to try wearing the boot. When did you want to follow up? Current appointment is for 1/2/25

## 2024-12-19 NOTE — TELEPHONE ENCOUNTER
----- Message from Gentry Flores DPM sent at 12/19/2024  4:47 PM CST -----  Please call the patient and advise he does have fractures of the 2nd 3rd and 4th metatarsals on the right foot.  Patient is to be completely nonweightbearing stay off of the right foot completely if he is able to put the fracture boot on without causing irritation I would prefer him being in the fracture boot while he is nonweightbearing if he is not able to do this he really needs to have a posterior splint on to protect the site of fracture to allow them to heal.  If the patient needs to come in for a splint we can certainly work him in to apply a splint to the right lower extremity.  Patient needs to ice and elevate and absolutely no weight on the right foot.  ----- Message -----  From: Interface, Rad Results In  Sent: 12/19/2024   3:27 PM CST  To: Gentry Flores DPM

## 2024-12-19 NOTE — TELEPHONE ENCOUNTER
----- Message from Gentry Flores DPM sent at 12/19/2024  4:52 PM CST -----  Please have the patient follow-up with us in 3 weeks in the [place] office so we can re take the x-rays to ensure the area is healing advised the patient these fractures should heal as long as he stays off of the foot.

## 2024-12-22 PROBLEM — M14.60 CHARCOT ARTHROPATHY: Status: ACTIVE | Noted: 2020-03-22

## 2024-12-22 PROBLEM — S92.301A CLOSED NONDISPLACED FRACTURE OF METATARSAL BONE OF RIGHT FOOT: Status: ACTIVE | Noted: 2024-12-22

## 2024-12-22 NOTE — PROGRESS NOTES
Subjective:       Patient ID: Jerry Islas is a 64 y.o. male.    Chief Complaint: Foot Injury and Ankle Injury  Patient presents with his wife today he relates that he had a fall recently injuring the right foot he states he has not put any weight on his foot since the injury they are concerned because he has extensive bruising and swelling of the right foot the injury occurred on December 14, 2024.  Patient has a history of diabetes he has diabetic related neuropathy Charcot foot right.  Follow-up  Associated symptoms include arthralgias and joint swelling.   Foot Pain  Associated symptoms include arthralgias and joint swelling.   Foot Ulcer  Associated symptoms include arthralgias and joint swelling.   Wound Check    Foot Injury     Ankle Injury        Review of Systems   Musculoskeletal:  Positive for arthralgias, gait problem and joint swelling.   Skin:  Positive for color change and wound.   All other systems reviewed and are negative.      Objective:      Physical Exam  Vitals and nursing note reviewed.   Constitutional:       Appearance: Normal appearance. He is well-developed.   Cardiovascular:      Pulses:           Dorsalis pedis pulses are 2+ on the right side and 2+ on the left side.        Posterior tibial pulses are 1+ on the right side and 1+ on the left side.   Pulmonary:      Effort: Pulmonary effort is normal.   Musculoskeletal:         General: Swelling, tenderness, deformity and signs of injury present.      Right lower leg: Edema present.      Right ankle: Deformity present. Decreased range of motion.      Right foot: Decreased range of motion. Deformity, Charcot foot and prominent metatarsal heads present.      Left foot: Deformity present.        Feet:    Feet:      Right foot:      Protective Sensation: 4 sites tested.  2 sites sensed.      Skin integrity: Skin breakdown, erythema and warmth present.      Left foot:      Protective Sensation: 4 sites tested.  2 sites sensed.   Skin:      General: Skin is warm.      Capillary Refill: Capillary refill takes 2 to 3 seconds.      Findings: Erythema present.   Neurological:      General: No focal deficit present.      Mental Status: He is alert.   Psychiatric:         Behavior: Behavior normal.         Thought Content: Thought content normal.         Judgment: Judgment normal.                                   Assessment:       1. Closed nondisplaced fracture of metatarsal bone of right foot, unspecified metatarsal, initial encounter    2. Foot injury, right, initial encounter    3. Ankle injury, right, initial encounter    4. Type 2 diabetes mellitus without complication, without long-term current use of insulin    5. Comprehensive diabetic foot examination, type 2 DM, encounter for        Plan:       Patient presents with his wife today he relates that he had a fall recently injuring the right foot he states he has not put any weight on his foot since the injury they are concerned because he has extensive bruising and swelling of the right foot the injury occurred on December 14, 2024.  Patient has a history of diabetes he has diabetic related neuropathy Charcot foot right.  On evaluation patient has severe ecchymosis and edema of the patient's right foot there is ecchymosis extending the entire lateral aspect of the right foot from the ankle to the 5th ray there is ecchymosis on the dorsal aspect of the right foot with the extensive edema as well as ecchymosis on the medial aspect patient has no skin breaks no active signs of infection noted clearly the patient suffered a severe trauma to the right foot.  I have ordered plain film x-rays of the right foot these were taken after the patient's visit patient subsequently was noted to have fractures of the 2nd 3rd 4th metatarsals with a questionable area on the 5th there is some mild displacement of the 3rd metatarsal none of these areas require surgical intervention but the patient is not to put any  weight at all on the right foot he is to maintain complete nonweightbearing status we did call the patient advise him as to the findings of x-ray offered to put him in a posterior splint however he stated that he would wear his fracture boot and maintain nonweightbearing status.  I have ordered a CT I am concerned about the rearfoot where the patient has had previous surgery as the patient's foot appears to be externally rotated more than it had been previously this may be related to advancing Charcot arthropathy but I want make sure that the surgical sites in the rearfoot have not been disrupted that is not obvious on plain film x-ray.  CT will be scheduled patient advised he needs to ice and elevate under no circumstances is he to put any weight at all on the right foot I plan to see him for follow-up in about 2 weeks to re-x-ray the area re-evaluate how he is doing certainly if he has any problems questions or concerns prior to that time he is to contact us immediately but I again advised the patient under no circumstances can he bear weight.  Patient the patient's wife were in understanding and agreement with everything discussed we will contact them once we have the results of the CT.  This note was created using [employer] voice recognition software that occasionally misinterpreted phrases or words.        45 minutes of total time spent on the encounter, which includes face to face time and non-face to face time preparing to see the patient (eg, review of tests), Obtaining and/or reviewing separately obtained history, Documenting clinical information in the electronic or other health record, Independently interpreting results (not separately reported) and communicating results to the patient/family/caregiver, or Care coordination (not separately reported).

## 2024-12-23 ENCOUNTER — TELEPHONE (OUTPATIENT)
Dept: PODIATRY | Facility: CLINIC | Age: 64
End: 2024-12-23
Payer: COMMERCIAL

## 2024-12-23 NOTE — TELEPHONE ENCOUNTER
Notified patient and his wife of results. Verbalized understanding. CT is scheduled for 12/26/2024. SINA Weathers 12/23/2024

## 2024-12-23 NOTE — TELEPHONE ENCOUNTER
----- Message from Gentry Flores DPM sent at 12/22/2024 12:13 PM CST -----  Please call the patient and advise him I have ordered a CT of his right foot I am concerned about the rearfoot area and possible disruption to the previous surgical site that is not obvious on a regular x-ray he has notable metatarsal fractures that we see on the x-ray however when I look at pictures of his foot previously compared to now it seems as if it is more externally rotated which would indicate injury to the midfoot and rearfoot where he has had the previous surgery please schedule his CT.

## 2024-12-26 ENCOUNTER — HOSPITAL ENCOUNTER (OUTPATIENT)
Dept: RADIOLOGY | Facility: HOSPITAL | Age: 64
Discharge: HOME | End: 2024-12-26
Attending: PODIATRIST
Payer: COMMERCIAL

## 2024-12-26 DIAGNOSIS — S92.301A CLOSED NONDISPLACED FRACTURE OF METATARSAL BONE OF RIGHT FOOT, UNSPECIFIED METATARSAL, INITIAL ENCOUNTER: ICD-10-CM

## 2024-12-26 PROCEDURE — 73700 CT LOWER EXTREMITY W/O DYE: CPT | Mod: 26,RT,, | Performed by: RADIOLOGY

## 2024-12-26 PROCEDURE — 73700 CT LOWER EXTREMITY W/O DYE: CPT | Mod: TC,RT

## 2024-12-27 ENCOUNTER — TELEPHONE (OUTPATIENT)
Dept: PODIATRY | Facility: CLINIC | Age: 64
End: 2024-12-27
Payer: COMMERCIAL

## 2024-12-27 NOTE — TELEPHONE ENCOUNTER
----- Message from Gentry Flores DPM sent at 12/26/2024  5:00 PM CST -----  Please call and advised the patient I did review the CT he does have a fracture at the base of the 1st metatarsal medial cuneiform joint that was not easily saline on plain film x-ray this is nondisplaced he does have fractures of the 2nd 3rd and 4th metatarsals.  The areas of previous surgery for the rearfoot and midfoot fusions are all intact.  Advised the patient under no circumstances can you put any weight at all on that right foot otherwise these areas will become displaced and not heal.  I will review CT with the patient at follow-up.  ----- Message -----  From: Interface, Rad Results In  Sent: 12/26/2024   1:20 PM CST  To: Gentry Flores DPM

## 2025-01-08 ENCOUNTER — HOSPITAL ENCOUNTER (OUTPATIENT)
Dept: RADIOLOGY | Facility: HOSPITAL | Age: 65
Discharge: HOME | End: 2025-01-08
Attending: PODIATRIST
Payer: COMMERCIAL

## 2025-01-08 ENCOUNTER — OFFICE VISIT (OUTPATIENT)
Dept: PODIATRY | Facility: CLINIC | Age: 65
End: 2025-01-08
Payer: COMMERCIAL

## 2025-01-08 VITALS
HEART RATE: 84 BPM | WEIGHT: 218.06 LBS | BODY MASS INDEX: 27.11 KG/M2 | DIASTOLIC BLOOD PRESSURE: 68 MMHG | SYSTOLIC BLOOD PRESSURE: 119 MMHG | HEIGHT: 75 IN

## 2025-01-08 DIAGNOSIS — S92.301G CLOSED NONDISPLACED FRACTURE OF METATARSAL BONE OF RIGHT FOOT WITH DELAYED HEALING, UNSPECIFIED METATARSAL, SUBSEQUENT ENCOUNTER: ICD-10-CM

## 2025-01-08 DIAGNOSIS — S92.301G CLOSED NONDISPLACED FRACTURE OF METATARSAL BONE OF RIGHT FOOT WITH DELAYED HEALING, UNSPECIFIED METATARSAL, SUBSEQUENT ENCOUNTER: Primary | ICD-10-CM

## 2025-01-08 DIAGNOSIS — E11.9 TYPE 2 DIABETES MELLITUS WITHOUT COMPLICATION, WITHOUT LONG-TERM CURRENT USE OF INSULIN: ICD-10-CM

## 2025-01-08 DIAGNOSIS — E11.9 COMPREHENSIVE DIABETIC FOOT EXAMINATION, TYPE 2 DM, ENCOUNTER FOR: ICD-10-CM

## 2025-01-08 PROCEDURE — 3288F FALL RISK ASSESSMENT DOCD: CPT | Mod: CPTII,S$GLB,, | Performed by: PODIATRIST

## 2025-01-08 PROCEDURE — 73630 X-RAY EXAM OF FOOT: CPT | Mod: TC,RT

## 2025-01-08 PROCEDURE — 1159F MED LIST DOCD IN RCRD: CPT | Mod: CPTII,S$GLB,, | Performed by: PODIATRIST

## 2025-01-08 PROCEDURE — 1101F PT FALLS ASSESS-DOCD LE1/YR: CPT | Mod: CPTII,S$GLB,, | Performed by: PODIATRIST

## 2025-01-08 PROCEDURE — 99214 OFFICE O/P EST MOD 30 MIN: CPT | Mod: S$GLB,,, | Performed by: PODIATRIST

## 2025-01-08 PROCEDURE — 99999 PR PBB SHADOW E&M-EST. PATIENT-LVL V: CPT | Mod: PBBFAC,,, | Performed by: PODIATRIST

## 2025-01-08 PROCEDURE — 73630 X-RAY EXAM OF FOOT: CPT | Mod: 26,RT,, | Performed by: RADIOLOGY

## 2025-01-08 PROCEDURE — 1160F RVW MEDS BY RX/DR IN RCRD: CPT | Mod: CPTII,S$GLB,, | Performed by: PODIATRIST

## 2025-01-08 PROCEDURE — 3078F DIAST BP <80 MM HG: CPT | Mod: CPTII,S$GLB,, | Performed by: PODIATRIST

## 2025-01-08 PROCEDURE — 3008F BODY MASS INDEX DOCD: CPT | Mod: CPTII,S$GLB,, | Performed by: PODIATRIST

## 2025-01-08 PROCEDURE — 3074F SYST BP LT 130 MM HG: CPT | Mod: CPTII,S$GLB,, | Performed by: PODIATRIST

## 2025-01-08 PROCEDURE — 1125F AMNT PAIN NOTED PAIN PRSNT: CPT | Mod: CPTII,S$GLB,, | Performed by: PODIATRIST

## 2025-01-08 RX ORDER — HYDROCODONE BITARTRATE AND ACETAMINOPHEN 10; 325 MG/1; MG/1
1 TABLET ORAL EVERY 8 HOURS PRN
COMMUNITY
Start: 2024-12-24

## 2025-01-11 NOTE — PROGRESS NOTES
Subjective:       Patient ID: Jerry Islas is a 64 y.o. male.  Patient presents today with his wife for follow-up of multiple metatarsal fractures right.  Chief Complaint: Foot Injury  Follow-up  Associated symptoms include arthralgias and joint swelling.   Foot Pain  Associated symptoms include arthralgias and joint swelling.   Foot Ulcer  Associated symptoms include arthralgias and joint swelling.   Wound Check    Foot Injury     Ankle Injury        Review of Systems   Musculoskeletal:  Positive for arthralgias, gait problem and joint swelling.   Skin:  Positive for color change and wound.   All other systems reviewed and are negative.      Objective:      Physical Exam  Vitals and nursing note reviewed.   Constitutional:       Appearance: Normal appearance. He is well-developed.   Cardiovascular:      Pulses:           Dorsalis pedis pulses are 2+ on the right side and 2+ on the left side.        Posterior tibial pulses are 1+ on the right side and 1+ on the left side.   Pulmonary:      Effort: Pulmonary effort is normal.   Musculoskeletal:         General: Swelling, tenderness, deformity and signs of injury present.      Right lower leg: Edema present.      Right ankle: Deformity present. Decreased range of motion.      Right foot: Decreased range of motion. Deformity, Charcot foot and prominent metatarsal heads present.      Left foot: Deformity present.        Feet:    Feet:      Right foot:      Protective Sensation: 4 sites tested.  2 sites sensed.      Skin integrity: Skin breakdown, erythema and warmth present.      Left foot:      Protective Sensation: 4 sites tested.  2 sites sensed.   Skin:     General: Skin is warm.      Capillary Refill: Capillary refill takes 2 to 3 seconds.      Findings: Erythema present.   Neurological:      General: No focal deficit present.      Mental Status: He is alert.   Psychiatric:         Behavior: Behavior normal.         Thought Content: Thought content normal.          Judgment: Judgment normal.                                                     Assessment:       1. Closed nondisplaced fracture of metatarsal bone of right foot with delayed healing, unspecified metatarsal, subsequent encounter    2. Type 2 diabetes mellitus without complication, without long-term current use of insulin    3. Comprehensive diabetic foot examination, type 2 DM, encounter for        Plan:       Patient presents today with his wife for follow-up of multiple metatarsal fractures right.  Patient states he has been doing well he has maintained a nonweightbearing status he is not able to wear the fracture boot because of the way his foot is externally rotated and has been previously fused he still has some ecchymosis on the plantar and lateral aspect of the patient's right foot but he states he has not been bearing any weight at all.  On evaluation patient has less swelling less inflammation than previously noted I did review his x-rays with him at length and in detail there has been a little bit of a shift in the fracture of the 3rd metatarsal however there is bone callus that has formed at the fracture sites of the 2nd 3rd and 4th metatarsals of the right foot.  I did advised the patient the bone callus is a good sign he needs to make sure he is not putting any weight at all on the right foot to give these the best chance to heal.  I am going to follow up with the patient in 3 weeks I advised the patient I am okay with him starting to bear weight as tolerated 2 weeks from now but under no circumstances and he is to put any weight at all on his foot for the next 2 weeks we will re-x-ray the area re-evaluate the area to ensure that all healing is complete before releasing him to full activity.  Patient advised he needs to continue to ice and elevate to control any inflammation certainly contact us with any problems questions or concerns follow-up 3 weeks.  I did advised the patient at this point I am  not recommending surgical intervention however should he fall or put pressure on that foot a could cause displacement of the fracture sites which would require surgical intervention.  This note was created using [employer] voice recognition software that occasionally misinterpreted phrases or words.

## 2025-01-29 ENCOUNTER — OFFICE VISIT (OUTPATIENT)
Dept: PODIATRY | Facility: CLINIC | Age: 65
End: 2025-01-29
Payer: COMMERCIAL

## 2025-01-29 ENCOUNTER — HOSPITAL ENCOUNTER (OUTPATIENT)
Dept: RADIOLOGY | Facility: HOSPITAL | Age: 65
Discharge: HOME | End: 2025-01-29
Attending: PODIATRIST
Payer: COMMERCIAL

## 2025-01-29 VITALS
HEIGHT: 75 IN | HEART RATE: 75 BPM | WEIGHT: 218.06 LBS | DIASTOLIC BLOOD PRESSURE: 64 MMHG | SYSTOLIC BLOOD PRESSURE: 135 MMHG | BODY MASS INDEX: 27.11 KG/M2

## 2025-01-29 DIAGNOSIS — E11.9 COMPREHENSIVE DIABETIC FOOT EXAMINATION, TYPE 2 DM, ENCOUNTER FOR: ICD-10-CM

## 2025-01-29 DIAGNOSIS — E11.9 TYPE 2 DIABETES MELLITUS WITHOUT COMPLICATION, WITHOUT LONG-TERM CURRENT USE OF INSULIN: ICD-10-CM

## 2025-01-29 DIAGNOSIS — S92.301G CLOSED NONDISPLACED FRACTURE OF METATARSAL BONE OF RIGHT FOOT WITH DELAYED HEALING, UNSPECIFIED METATARSAL, SUBSEQUENT ENCOUNTER: ICD-10-CM

## 2025-01-29 DIAGNOSIS — S92.301G CLOSED NONDISPLACED FRACTURE OF METATARSAL BONE OF RIGHT FOOT WITH DELAYED HEALING, UNSPECIFIED METATARSAL, SUBSEQUENT ENCOUNTER: Primary | ICD-10-CM

## 2025-01-29 PROCEDURE — 73630 X-RAY EXAM OF FOOT: CPT | Mod: 26,RT,, | Performed by: RADIOLOGY

## 2025-01-29 PROCEDURE — 73630 X-RAY EXAM OF FOOT: CPT | Mod: TC,RT

## 2025-01-29 PROCEDURE — 1126F AMNT PAIN NOTED NONE PRSNT: CPT | Mod: CPTII,S$GLB,, | Performed by: PODIATRIST

## 2025-01-29 PROCEDURE — 99999 PR PBB SHADOW E&M-EST. PATIENT-LVL V: CPT | Mod: PBBFAC,,, | Performed by: PODIATRIST

## 2025-01-29 PROCEDURE — 3075F SYST BP GE 130 - 139MM HG: CPT | Mod: CPTII,S$GLB,, | Performed by: PODIATRIST

## 2025-01-29 PROCEDURE — 3288F FALL RISK ASSESSMENT DOCD: CPT | Mod: CPTII,S$GLB,, | Performed by: PODIATRIST

## 2025-01-29 PROCEDURE — 99213 OFFICE O/P EST LOW 20 MIN: CPT | Mod: S$GLB,,, | Performed by: PODIATRIST

## 2025-01-29 PROCEDURE — 1160F RVW MEDS BY RX/DR IN RCRD: CPT | Mod: CPTII,S$GLB,, | Performed by: PODIATRIST

## 2025-01-29 PROCEDURE — 1100F PTFALLS ASSESS-DOCD GE2>/YR: CPT | Mod: CPTII,S$GLB,, | Performed by: PODIATRIST

## 2025-01-29 PROCEDURE — 3008F BODY MASS INDEX DOCD: CPT | Mod: CPTII,S$GLB,, | Performed by: PODIATRIST

## 2025-01-29 PROCEDURE — 3078F DIAST BP <80 MM HG: CPT | Mod: CPTII,S$GLB,, | Performed by: PODIATRIST

## 2025-01-29 PROCEDURE — 1159F MED LIST DOCD IN RCRD: CPT | Mod: CPTII,S$GLB,, | Performed by: PODIATRIST

## 2025-02-02 NOTE — PROGRESS NOTES
Subjective:       Patient ID: Jerry Islas is a 64 y.o. male.  Patient presents today with his wife for follow-up of multiple metatarsal fractures right.  Chief Complaint: Foot Injury  Follow-up  Associated symptoms include arthralgias and joint swelling.   Foot Pain  Associated symptoms include arthralgias and joint swelling.   Foot Ulcer  Associated symptoms include arthralgias and joint swelling.   Wound Check    Foot Injury     Ankle Injury        Review of Systems   Musculoskeletal:  Positive for arthralgias, gait problem and joint swelling.   Skin:  Positive for color change and wound.   All other systems reviewed and are negative.      Objective:      Physical Exam  Vitals and nursing note reviewed.   Constitutional:       Appearance: Normal appearance. He is well-developed.   Cardiovascular:      Pulses:           Dorsalis pedis pulses are 2+ on the right side and 2+ on the left side.        Posterior tibial pulses are 1+ on the right side and 1+ on the left side.   Pulmonary:      Effort: Pulmonary effort is normal.   Musculoskeletal:         General: Swelling, tenderness, deformity and signs of injury present.      Right lower leg: Edema present.      Right ankle: Deformity present. Decreased range of motion.      Right foot: Decreased range of motion. Deformity, Charcot foot and prominent metatarsal heads present.      Left foot: Deformity present.        Feet:    Feet:      Right foot:      Protective Sensation: 4 sites tested.  2 sites sensed.      Skin integrity: Skin breakdown, erythema and warmth present.      Left foot:      Protective Sensation: 4 sites tested.  2 sites sensed.   Skin:     General: Skin is warm.      Capillary Refill: Capillary refill takes 2 to 3 seconds.      Findings: Erythema present.   Neurological:      General: No focal deficit present.      Mental Status: He is alert.   Psychiatric:         Behavior: Behavior normal.         Thought Content: Thought content normal.          Judgment: Judgment normal.                                                                   Assessment:       1. Closed nondisplaced fracture of metatarsal bone of right foot with delayed healing, unspecified metatarsal, subsequent encounter    2. Type 2 diabetes mellitus without complication, without long-term current use of insulin    3. Comprehensive diabetic foot examination, type 2 DM, encounter for        Plan:       Patient presents today for follow-up of multiple metatarsal fractures right.  Patient states he has been doing well he relates that he has been wearing a tennis shoe on the right foot for about one-week he still taking it very easy he is not overdoing it he has been using the bone growth stimulator they had from previous surgery and he has been using this every day.  On evaluation the patient's foot looks a lot better the patient has chronic deformity of his foot but he has a lot less erythema edema than previously noted the ecchymosis has also settled down considerably.  I did review x-rays at length and in detail while the patient still has signs of shift of the fractured fragment 3rd metatarsal it does appear to be well healing at this time I advised the patient this has not shifted any further than it had previously.  I did advised the patient I am okay with him continuing to wear the tennis shoe he needs to understand the bone is likely 80-85% healed certainly any additional trauma excessive pressure to the area and it could reef fracture he needs to be very careful as this continues to heal over the next 4 weeks.  Under no circumstances is the patient to walk with out his good supportive tennis shoe on that is offering him protection I will see the patient as needed for follow-up any problems questions concerns episodes where his foot flares up and swells he is to contact us immediately we can always take an additional x-ray of the area to confirm continued healing.  This note was created  using [employer] voice recognition software that occasionally misinterpreted phrases or words.

## (undated) DEVICE — TOURNIQUET SB QC SP 18X4IN

## (undated) DEVICE — PAD PREPS ALCOHOL 2-PLY LARGE

## (undated) DEVICE — BANDAGE MATRIX HK LOOP 4IN 5YD

## (undated) DEVICE — SUT MONO 3-0 PS-2 18 PLST

## (undated) DEVICE — JELLY KY LUBRICATING 5G PACKET

## (undated) DEVICE — SUT 4-0 VICRYL / SH

## (undated) DEVICE — ELECTRODE REM PLYHSV RETURN 9

## (undated) DEVICE — CANISTER SUCTION 3000CC

## (undated) DEVICE — BLADE SURG #15 CARBON STEEL

## (undated) DEVICE — GLOVE PI ULTRA TOUCH G SURGEON

## (undated) DEVICE — DRAPE MINI C-ARM

## (undated) DEVICE — NDL SAFETY 25G X 1.5 ECLIPSE

## (undated) DEVICE — KWIRE SMTH TRCR TIP 1.1X150MM
Type: IMPLANTABLE DEVICE | Site: FOOT | Status: NON-FUNCTIONAL
Removed: 2023-02-10

## (undated) DEVICE — SYR B-D DISP CONTROL 10CC100/C

## (undated) DEVICE — KWIRE SMOOTH TRCR TIP .9X150MM
Type: IMPLANTABLE DEVICE | Site: FOOT | Status: NON-FUNCTIONAL
Removed: 2023-02-10

## (undated) DEVICE — PACK ELITE MINIVIEW DRAPE

## (undated) DEVICE — CATH IV INTROCAN 18G X 1 1/4

## (undated) DEVICE — GLOVE SURGEONS ULTRA TOUCH 6.5

## (undated) DEVICE — PADDING CAST SPECIALIST 6X4YD

## (undated) DEVICE — SOL NACL IRR 1000ML BTL

## (undated) DEVICE — PAD ABD 8X10 STERILE

## (undated) DEVICE — DRAPE T EXTRM SURG 121X128X90

## (undated) DEVICE — DRESSING N ADH OIL EMUL 3X3

## (undated) DEVICE — SEE MEDLINE ITEM 146298

## (undated) DEVICE — SEE MEDLINE ITEM 152522

## (undated) DEVICE — TAPE CASTING 4 X 4YDS WHT

## (undated) DEVICE — COVER EQUIP 36X12 W/ELSTC BAND

## (undated) DEVICE — SEE MEDLINE ITEM 156964

## (undated) DEVICE — SPONGE DERMACEA GAUZE 4X4

## (undated) DEVICE — Device

## (undated) DEVICE — BLADE SAGITTAL 18MMX4MMX.38MM

## (undated) DEVICE — SEE MEDLINE ITEM 157116

## (undated) DEVICE — BLADE SAGITTAL 31MMX10MMX.38MM

## (undated) DEVICE — NDL HYPO REG 25G X 1 1/2

## (undated) DEVICE — GLOVE BIOGEL PI ORTHO PRO 7.5

## (undated) DEVICE — SOL 9P NACL IRR PIC IL

## (undated) DEVICE — SPLINT FIBERGLASS PAD 6X15

## (undated) DEVICE — SUT 4-0 ETHILON 18 PS-2

## (undated) DEVICE — SPONGE LAP 18X18 PREWASHED

## (undated) DEVICE — SUT 3-0 VICRYL / SH (J416)

## (undated) DEVICE — TRAY SKIN SCRUB WET PREMIUM

## (undated) DEVICE — GLOVE BIOGEL PI ORTHO PRO SZ7

## (undated) DEVICE — GLOVE SURG ULTRA TOUCH 7

## (undated) DEVICE — LABEL BLANK SURG 10 PER SHEET

## (undated) DEVICE — PADDING CAST 4IN SPECIALIST

## (undated) DEVICE — GAUZE SPONGE 4X4 12PLY

## (undated) DEVICE — BLADE SAW MED NAR 18.0X5.5MM

## (undated) DEVICE — NDL 18GA

## (undated) DEVICE — BANDAGE ESMARK ELASTIC ST 4X9

## (undated) DEVICE — GLOVE BIOGEL PI ORTHO PRO SZ 8